# Patient Record
Sex: FEMALE | Race: BLACK OR AFRICAN AMERICAN | NOT HISPANIC OR LATINO | Employment: UNEMPLOYED | URBAN - METROPOLITAN AREA
[De-identification: names, ages, dates, MRNs, and addresses within clinical notes are randomized per-mention and may not be internally consistent; named-entity substitution may affect disease eponyms.]

---

## 2018-07-24 ENCOUNTER — APPOINTMENT (OUTPATIENT)
Dept: RADIOLOGY | Facility: MEDICAL CENTER | Age: 23
End: 2018-07-24
Attending: EMERGENCY MEDICINE
Payer: COMMERCIAL

## 2018-07-24 ENCOUNTER — HOSPITAL ENCOUNTER (EMERGENCY)
Facility: MEDICAL CENTER | Age: 23
End: 2018-07-24
Attending: EMERGENCY MEDICINE
Payer: COMMERCIAL

## 2018-07-24 VITALS
TEMPERATURE: 98.6 F | BODY MASS INDEX: 23.98 KG/M2 | WEIGHT: 152.78 LBS | HEIGHT: 67 IN | SYSTOLIC BLOOD PRESSURE: 124 MMHG | HEART RATE: 95 BPM | OXYGEN SATURATION: 97 % | RESPIRATION RATE: 18 BRPM | DIASTOLIC BLOOD PRESSURE: 75 MMHG

## 2018-07-24 DIAGNOSIS — R10.9 ABDOMINAL PAIN DURING PREGNANCY IN SECOND TRIMESTER: ICD-10-CM

## 2018-07-24 DIAGNOSIS — O26.892 ABDOMINAL PAIN DURING PREGNANCY IN SECOND TRIMESTER: ICD-10-CM

## 2018-07-24 LAB
APPEARANCE UR: CLEAR
BILIRUB UR QL STRIP.AUTO: NEGATIVE
COLOR UR: YELLOW
GLUCOSE UR STRIP.AUTO-MCNC: NEGATIVE MG/DL
KETONES UR STRIP.AUTO-MCNC: NEGATIVE MG/DL
LEUKOCYTE ESTERASE UR QL STRIP.AUTO: NEGATIVE
MICRO URNS: NORMAL
NITRITE UR QL STRIP.AUTO: NEGATIVE
PH UR STRIP.AUTO: 6 [PH]
PROT UR QL STRIP: NEGATIVE MG/DL
RBC UR QL AUTO: NEGATIVE
SP GR UR STRIP.AUTO: 1.02

## 2018-07-24 PROCEDURE — 81003 URINALYSIS AUTO W/O SCOPE: CPT

## 2018-07-24 PROCEDURE — 76817 TRANSVAGINAL US OBSTETRIC: CPT

## 2018-07-24 PROCEDURE — 99284 EMERGENCY DEPT VISIT MOD MDM: CPT

## 2018-07-24 ASSESSMENT — PAIN SCALES - GENERAL
PAINLEVEL_OUTOF10: 6
PAINLEVEL_OUTOF10: 0

## 2018-07-25 NOTE — DISCHARGE INSTRUCTIONS
Abdominal Pain During Pregnancy  Belly (abdominal) pain is common during pregnancy. Most of the time, it is not a serious problem. Other times, it can be a sign that something is wrong with the pregnancy. Always tell your doctor if you have belly pain.  Follow these instructions at home:  Monitor your belly pain for any changes. The following actions may help you feel better:  · Do not have sex (intercourse) or put anything in your vagina until you feel better.  · Rest until your pain stops.  · Drink clear fluids if you feel sick to your stomach (nauseous). Do not eat solid food until you feel better.  · Only take medicine as told by your doctor.  · Keep all doctor visits as told.  Get help right away if:  · You are bleeding, leaking fluid, or pieces of tissue come out of your vagina.  · You have more pain or cramping.  · You keep throwing up (vomiting).  · You have pain when you pee (urinate) or have blood in your pee.  · You have a fever.  · You do not feel your baby moving as much.  · You feel very weak or feel like passing out.  · You have trouble breathing, with or without belly pain.  · You have a very bad headache and belly pain.  · You have fluid leaking from your vagina and belly pain.  · You keep having watery poop (diarrhea).  · Your belly pain does not go away after resting, or the pain gets worse.  This information is not intended to replace advice given to you by your health care provider. Make sure you discuss any questions you have with your health care provider.  Document Released: 12/06/2010 Document Revised: 07/26/2017 Document Reviewed: 07/17/2014  [employer] Interactive Patient Education © 2017 [employer]

## 2018-07-25 NOTE — ED PROVIDER NOTES
"CHIEF COMPLAINT  Chief Complaint   Patient presents with   • Pregnancy     23 weeks per pt   • Pelvic Pain     \"hurts with every step I take\", x approx 20 minutes       HPI  Leila Navarro is a 23 y.o. female who presents for evaluation of bilateral lower quadrant and groin pain for approximately 20 minutes prior to arrival. Patient notes it only hurts when she is walking and she currently has no pain when she is lying in the gurney. She has had no vaginal bleeding, discharge, burning with urination, or hematuria. She has no back pain, chest pain, or shortness of breath. She has had no recent illnesses. She has had no recent trauma. Patient notes this is her 1st pregnancy and is very \"stressed.\"    REVIEW OF SYSTEMS  Constitutional: No fevers, weakness, weight loss   Skin: No rashes, abrasions, lacerations, or pruritus  HEENT: No diplopia or blurred vision, no eye pain, no discharge.  No sore throat, runny nose, sores, trouble swallowing, trouble speaking.  Neck: No neck pain, stiffness, or masses.  Chest: No pain or rashes  Pulm: No shortness of breath, cough, wheezing, stridor, or pain with inspiration/expiration  Gastrointestinal: No nausea, vomiting, diarrhea, constipation, bloating, melena, hematochezia or pain.  Genitourinary: No pain, urgency, frequency, dysuria, hematuria, or polyuria.   Musculoskeletal: No recent trauma, pain, swelling, weakness  Neurologic: No sensory or motor changes. No confusion or disorientation.  Psych: Nonsuicidal and non-homicidal.  Heme: No bleeding or bruising problems.   Immuno: No hx of recurrent infections      PAST MEDICAL HISTORY       SOCIAL HISTORY  Social History     Social History Main Topics   • Smoking status: Never Smoker   • Smokeless tobacco: Not on file   • Alcohol use No   • Drug use: No   • Sexual activity: Not on file       SURGICAL HISTORY  patient denies any surgical history    CURRENT MEDICATIONS  Home Medications    **Home medications have not yet been " "reviewed for this encounter**         ALLERGIES  No Known Allergies    PHYSICAL EXAM  VITAL SIGNS: /75   Pulse 95   Temp 37 °C (98.6 °F)   Resp 18   Ht 1.702 m (5' 7\")   Wt 69.3 kg (152 lb 12.5 oz)   SpO2 97%   BMI 23.93 kg/m²    Gen: Alert in no apparent distress.  HEENT: No signs of trauma, Bilateral external ears normal, Nose normal. Conjunctiva normal, Non-icteric.   Neck:  No tenderness, Supple, No masses  Lymphatic: No cervical lymphadenopathy noted.   Cardiovascular: Regular rate and rhythm, no murmurs.   Thorax & Lungs: Normal breath sounds, No respiratory distress, No wheezing bilateral chest rise  Abdomen: Bowel sounds normal, Soft, No tenderness, No masses, No pulsatile masses. No Guarding or rebound  Skin: Warm, Dry, No erythema, No rash.   Back: No bony tenderness, No CVA tenderness.   Extremities: Intact distal pulses, No edema, No tenderness, range of motion grossly normal all ext. No tenderness to palpation or major deformities noted.   Neurologic: Alert , no facial droop, grossly normal coordination and strength  Psychiatric: Affect normal, Judgment normal, Mood normal.       INITIAL IMPRESSION  Patient does not appear septic or toxic and actually has no pain on the initial evaluation. She only has pain when she is walking in the groin region which is likely related to round ligament pain. Regardless, I feel is reasonable to do a screening urinalysis and fetal well-being ultrasound as the patient is quite nervous about this. She has had no recent symptoms to suggest the need for a pelvic exam at this time.    LABS  Results for orders placed or performed during the hospital encounter of 07/24/18   URINALYSIS CULTURE, IF INDICATED   Result Value Ref Range    Color Yellow     Character Clear     Specific Gravity 1.025 <1.035    Ph 6.0 5.0 - 8.0    Glucose Negative Negative mg/dL    Ketones Negative Negative mg/dL    Protein Negative Negative mg/dL    Bilirubin Negative Negative    Nitrite " Negative Negative    Leukocyte Esterase Negative Negative    Occult Blood Negative Negative    Micro Urine Req see below        RADIOLOGY  US-OB PELVIS TRANSVAGINAL   Final Result      Intrauterine pregnancy with gestational age by this ultrasound of 23 weeks and 1 day with estimated delivery date 11/19/2018.      ALANIS 8.7 cm.      Low lying placenta with the edge of the placenta 2.87 cm from the internal cervical os.               COURSE & MEDICAL DECISION MAKING  Pertinent Labs & Imaging studies reviewed. (See chart for details)  Patient has no findings today to suggest an emergent etiology to explain her pain and most likely suffering from round ligament pain. She does not appear septic or toxic and had no abdominal pain or tenderness throughout her stay in the emergency department. I did not feel further imaging was necessary and I very much doubt appendicitis. I did not feel pelvic exam which  benefit the patient in any way. She'll follow up with her primary care physician next week or return if her symptoms worsen or change.    FINAL IMPRESSION  1. Abdominal pain in 2nd trimester pregnancy  2.   3.         Electronically signed by: Maik Mata, 7/24/2018 5:39 PM

## 2018-08-13 ENCOUNTER — NON-PROVIDER VISIT (OUTPATIENT)
Dept: OBGYN | Facility: CLINIC | Age: 23
End: 2018-08-13
Payer: MEDICAID

## 2018-08-13 DIAGNOSIS — Z32.01 PREGNANCY EXAMINATION OR TEST, POSITIVE RESULT: ICD-10-CM

## 2018-08-13 LAB
INT CON NEG: NEGATIVE
INT CON POS: POSITIVE
POC URINE PREGNANCY TEST: POSITIVE

## 2018-08-13 PROCEDURE — 81025 URINE PREGNANCY TEST: CPT | Performed by: OBSTETRICS & GYNECOLOGY

## 2018-08-27 ENCOUNTER — HOSPITAL ENCOUNTER (EMERGENCY)
Facility: MEDICAL CENTER | Age: 23
End: 2018-08-27
Attending: EMERGENCY MEDICINE
Payer: MEDICAID

## 2018-08-27 VITALS
WEIGHT: 159.17 LBS | SYSTOLIC BLOOD PRESSURE: 130 MMHG | HEIGHT: 66 IN | TEMPERATURE: 97.8 F | RESPIRATION RATE: 16 BRPM | DIASTOLIC BLOOD PRESSURE: 88 MMHG | OXYGEN SATURATION: 98 % | HEART RATE: 88 BPM | BODY MASS INDEX: 25.58 KG/M2

## 2018-08-27 DIAGNOSIS — Z3A.29 29 WEEKS GESTATION OF PREGNANCY: ICD-10-CM

## 2018-08-27 DIAGNOSIS — J01.90 ACUTE SINUSITIS, RECURRENCE NOT SPECIFIED, UNSPECIFIED LOCATION: ICD-10-CM

## 2018-08-27 LAB
APPEARANCE UR: CLEAR
BILIRUB UR QL STRIP.AUTO: NEGATIVE
COLOR UR: YELLOW
GLUCOSE UR STRIP.AUTO-MCNC: NEGATIVE MG/DL
KETONES UR STRIP.AUTO-MCNC: NEGATIVE MG/DL
LEUKOCYTE ESTERASE UR QL STRIP.AUTO: NEGATIVE
MICRO URNS: NORMAL
NITRITE UR QL STRIP.AUTO: NEGATIVE
PH UR STRIP.AUTO: 6.5 [PH]
PROT UR QL STRIP: NEGATIVE MG/DL
RBC UR QL AUTO: NEGATIVE
SP GR UR STRIP.AUTO: 1.01

## 2018-08-27 PROCEDURE — 99284 EMERGENCY DEPT VISIT MOD MDM: CPT

## 2018-08-27 PROCEDURE — 81003 URINALYSIS AUTO W/O SCOPE: CPT

## 2018-08-27 RX ORDER — AMOXICILLIN 500 MG/1
500 CAPSULE ORAL 3 TIMES DAILY
Qty: 30 CAP | Refills: 0 | Status: SHIPPED | OUTPATIENT
Start: 2018-08-27 | End: 2018-09-06

## 2018-08-27 RX ORDER — ONDANSETRON 4 MG/1
4 TABLET, ORALLY DISINTEGRATING ORAL EVERY 8 HOURS PRN
Qty: 15 TAB | Refills: 0 | Status: ON HOLD | OUTPATIENT
Start: 2018-08-27 | End: 2018-10-24

## 2018-08-27 ASSESSMENT — PAIN SCALES - GENERAL: PAINLEVEL_OUTOF10: 0

## 2018-08-27 NOTE — DISCHARGE INSTRUCTIONS
Third Trimester of Pregnancy  The third trimester is from week 29 through week 42, months 7 through 9. This trimester is when your unborn baby (fetus) is growing very fast. At the end of the ninth month, the unborn baby is about 20 inches in length. It weighs about 6-10 pounds.  Follow these instructions at home:  · Avoid all smoking, herbs, and alcohol. Avoid drugs not approved by your doctor.  · Do not use any tobacco products, including cigarettes, chewing tobacco, and electronic cigarettes. If you need help quitting, ask your doctor. You may get counseling or other support to help you quit.  · Only take medicine as told by your doctor. Some medicines are safe and some are not during pregnancy.  · Exercise only as told by your doctor. Stop exercising if you start having cramps.  · Eat regular, healthy meals.  · Wear a good support bra if your breasts are tender.  · Do not use hot tubs, steam rooms, or saunas.  · Wear your seat belt when driving.  · Avoid raw meat, uncooked cheese, and liter boxes and soil used by cats.  · Take your prenatal vitamins.  · Take [id number] milligrams of calcium daily starting at the 20th week of pregnancy until you deliver your baby.  · Try taking medicine that helps you poop (stool softener) as needed, and if your doctor approves. Eat more fiber by eating fresh fruit, vegetables, and whole grains. Drink enough fluids to keep your pee (urine) clear or pale yellow.  · Take warm water baths (sitz baths) to soothe pain or discomfort caused by hemorrhoids. Use hemorrhoid cream if your doctor approves.  · If you have puffy, bulging veins (varicose veins), wear support hose. Raise (elevate) your feet for 15 minutes, 3-4 times a day. Limit salt in your diet.  · Avoid heavy lifting, wear low heels, and sit up straight.  · Rest with your legs raised if you have leg cramps or low back pain.  · Visit your dentist if you have not gone during your pregnancy. Use a soft toothbrush to brush your  teeth. Be gentle when you floss.  · You can have sex (intercourse) unless your doctor tells you not to.  · Do not travel far distances unless you must. Only do so with your doctor's approval.  · Take prenatal classes.  · Practice driving to the hospital.  · Pack your hospital bag.  · Prepare the baby's room.  · Go to your doctor visits.  Get help if:  · You are not sure if you are in labor or if your water has broken.  · You are dizzy.  · You have mild cramps or pressure in your lower belly (abdominal).  · You have a nagging pain in your belly area.  · You continue to feel sick to your stomach (nauseous), throw up (vomit), or have watery poop (diarrhea).  · You have bad smelling fluid coming from your vagina.  · You have pain with peeing (urination).  Get help right away if:  · You have a fever.  · You are leaking fluid from your vagina.  · You are spotting or bleeding from your vagina.  · You have severe belly cramping or pain.  · You lose or gain weight rapidly.  · You have trouble catching your breath and have chest pain.  · You notice sudden or extreme puffiness (swelling) of your face, hands, ankles, feet, or legs.  · You have not felt the baby move in over an hour.  · You have severe headaches that do not go away with medicine.  · You have vision changes.  This information is not intended to replace advice given to you by your health care provider. Make sure you discuss any questions you have with your health care provider.  Document Released: 03/14/2011 Document Revised: 05/25/2017 Document Reviewed: 02/18/2014  [employer] Interactive Patient Education © 2017 [employer]    Sinusitis, Adult  Sinusitis is soreness and inflammation of your sinuses. Sinuses are hollow spaces in the bones around your face. They are located:  · Around your eyes.  · In the middle of your forehead.  · Behind your nose.  · In your cheekbones.  Your sinuses and nasal passages are lined with a stringy fluid (mucus). Mucus normally  drains out of your sinuses. When your nasal tissues get inflamed or swollen, the mucus can get trapped or blocked so air cannot flow through your sinuses. This lets bacteria, viruses, and funguses grow, and that leads to infection.  Follow these instructions at home:  Medicines  · Take, use, or apply over-the-counter and prescription medicines only as told by your doctor. These may include nasal sprays.  · If you were prescribed an antibiotic medicine, take it as told by your doctor. Do not stop taking the antibiotic even if you start to feel better.  Hydrate and Humidify  · Drink enough water to keep your pee (urine) clear or pale yellow.  · Use a cool mist humidifier to keep the humidity level in your home above 50%.  · Breathe in steam for 10-15 minutes, 3-4 times a day or as told by your doctor. You can do this in the bathroom while a hot shower is running.  · Try not to spend time in cool or dry air.  Rest  · Rest as much as possible.  · Sleep with your head raised (elevated).  · Make sure to get enough sleep each night.  General instructions  · Put a warm, moist washcloth on your face 3-4 times a day or as told by your doctor. This will help with discomfort.  · Wash your hands often with soap and water. If there is no soap and water, use hand .  · Do not smoke. Avoid being around people who are smoking (secondhand smoke).  · Keep all follow-up visits as told by your doctor. This is important.  Contact a doctor if:  · You have a fever.  · Your symptoms get worse.  · Your symptoms do not get better within 10 days.  Get help right away if:  · You have a very bad headache.  · You cannot stop throwing up (vomiting).  · You have pain or swelling around your face or eyes.  · You have trouble seeing.  · You feel confused.  · Your neck is stiff.  · You have trouble breathing.  This information is not intended to replace advice given to you by your health care provider. Make sure you discuss any questions you  have with your health care provider.  Document Released: 06/05/2009 Document Revised: 08/13/2017 Document Reviewed: 10/12/2016  [employer] Interactive Patient Education © 2017 [employer]

## 2018-08-27 NOTE — ED PROVIDER NOTES
ED Provider Note    CHIEF COMPLAINT  Chief Complaint   Patient presents with   • Abdominal Cramping     Pt is 29 weeks pregnant, reports decreased fetal movement for past few days. Pt reports family hx of pre term labor, this is her first pregnancy. Denies spotting   • Sore Throat     Since yesterday, denies fevers        HPI  Leila Navarro is a 23 y.o. female who presents sore throat ear pain drainage from nasal passages for several days.  Patient states that she is 29 weeks pregnant.  She still feels the baby moving but does not quite as much today as last week.  Nothing really makes her better or worse.  No cough no vomiting or diarrhea no abdominal pain.    States that she had prenatal care when she is about 2 months along but then moved to [place].  He has not seen an OB/GYN doctor since then.  She states she called the pregnancy center and they could not get her in until October.  She has seen an OB/GYN doctor in [place] for this pregnancy however.  No vaginal bleeding.  Nothing makes it better or worse.    REVIEW OF SYSTEMS  CONSTITUTIONAL:  Denies fever, chills, positive weight gain  EYES:  Deniesdischarge   ENT: Positive sore throat runny nose and ear pain  CARDIOVASCULAR:  Denies chest pain, palpitations or swelling  RESPIRATORY:  Denies cough or shortness of breath or difficulty breathing  GI:  Denies abdominal pain,  vomiting, or diarrhea  MUSCULOSKELETAL:  Denies weakness joint swelling or back pain  SKIN:  No rash  ALLERGIC: No itchy rashes.  NEUROLOGIC:  Denies headache, focal weakness or numbness  PSYCHIATRIC:  Denies depression    PAST MEDICAL HISTORY    29 weeks pregnant    FAMILY HISTORY   labor    SOCIAL HISTORY   reports that she has never smoked. She has never used smokeless tobacco. She reports that she does not drink alcohol or use drugs.    SURGICAL HISTORY  History reviewed. No pertinent surgical history.    CURRENT MEDICATIONS  Home Medications     Reviewed by Annie ROLDAN  "Yue Verduzco (Pharmacy Tech) on 08/27/18 at 1147  Med List Status: Complete   Medication Last Dose Status        Patient Martinez Taking any Medications                       ALLERGIES  No Known Allergies      PHYSICAL EXAM  VITAL SIGNS: /93   Pulse (!) 106   Temp 36.5 °C (97.7 °F)   Resp 18   Ht 1.676 m (5' 6\")   Wt 72.2 kg (159 lb 2.8 oz)   SpO2 97%   BMI 25.69 kg/m²      Constitutional: Patient is awake alert person place and time. No acute respiratory distress Well developed, Well nourished, Non-toxic appearance.  Sitting up.  HENT: Normocephalic, Atraumatic,  Bilateral external ears bilateral erythema., Oropharynx erythematous moist with no exudates, Nose patent.   Eyes:  Sclera and conjunctiva clear, No discharge.   Neck:  Supple no nuchal rigidity,   Cardiovascular: Heart is regular rate and rhythm no murmur  Thorax & Lungs: Chest is symmetrical, with good breath sounds. No wheezing or crackles. No respiratory distress,  Abdomen:  Soft, gravid nontender.  Back: Non tender with palpation, No CVA tenderness.   Extremities: No c edema.  Non tender.   Musculoskeletal: Good range of motion upper lower extremities.    Neurologic: Alert & oriented   Strength is strength is symmetrical in upper extremities.  DTRs are 2+ in the biceps.  She is not hyperreflexic         COURSE & MEDICAL DECISION MAKING  Pertinent Labs & Imaging studies reviewed. (See chart for details)  Patient is 29 weeks pregnant.  She had prenatal care first couple of months and then [place] to [place].  She states that she still feels her baby moving but maybe not as much now as she did last week.  She is also had a cold with a sore throat ear pain runny nose and a cough.  Positive yellow sputum.  Currently she has no abdominal pain no cramping at all.  I had a long discussion with her about need for very close follow-up with her pregnancy and she can certainly go to the pregnancy center or any OB/GYN doctor this on her " insurance plan.  Although I told her she could not find anybody that we will see her at this late date she may consider staying down in her previous home in [place] until she delivers her baby where she is can continue and has had prenatal care and can continue this.  She states that she may consider this as well.  At this point time I think that she may have a bacterial sinusitis were placed on amoxicillin.  Again she understands she will need close follow-up as an outpatient for prenatal care.      Stable for discharge    FINAL IMPRESSION  1.  Sinusitis/upper respiratory tract infection  2.  Pregnancy 29 weeks       PLAN  1.  Follow-up with the [facility] pregnancy center within 1 week  2.  Follow-up the primary care doctor the [facility] clinic within 1 week  3.  Sinusitis information sheet  4.  Amoxicillin/pregnancy information sheet  5. Return to the emergency department for increased pains, fevers, vomiting or change in condition.  Electronically signed by: Austin Loera, 8/27/2018 12:22 PM

## 2018-08-27 NOTE — ED NOTES
"Chief Complaint   Patient presents with   • Abdominal Cramping     Pt is 29 weeks pregnant, reports decreased fetal movement for past few days. Pt reports family hx of pre term labor, this is her first pregnancy. Denies spotting   • Sore Throat     Since yesterday, denies fevers      /93   Pulse (!) 106   Temp 36.5 °C (97.7 °F)   Resp 18   Ht 1.676 m (5' 6\")   Wt 72.2 kg (159 lb 2.8 oz)   SpO2 97%   BMI 25.69 kg/m²     "

## 2018-10-10 ENCOUNTER — APPOINTMENT (OUTPATIENT)
Dept: RADIOLOGY | Facility: MEDICAL CENTER | Age: 23
End: 2018-10-10
Attending: ADVANCED PRACTICE MIDWIFE
Payer: MEDICAID

## 2018-10-10 ENCOUNTER — HOSPITAL ENCOUNTER (OUTPATIENT)
Facility: MEDICAL CENTER | Age: 23
End: 2018-10-10
Payer: MEDICAID

## 2018-10-10 VITALS
HEIGHT: 67 IN | BODY MASS INDEX: 27 KG/M2 | TEMPERATURE: 97.3 F | SYSTOLIC BLOOD PRESSURE: 145 MMHG | HEART RATE: 103 BPM | WEIGHT: 172 LBS | DIASTOLIC BLOOD PRESSURE: 103 MMHG

## 2018-10-10 LAB
ALBUMIN SERPL BCP-MCNC: 3.3 G/DL (ref 3.2–4.9)
ALBUMIN/GLOB SERPL: 1 G/DL
ALP SERPL-CCNC: 151 U/L (ref 30–99)
ALT SERPL-CCNC: 13 U/L (ref 2–50)
ANION GAP SERPL CALC-SCNC: 7 MMOL/L (ref 0–11.9)
APPEARANCE UR: ABNORMAL
AST SERPL-CCNC: 18 U/L (ref 12–45)
BACTERIA #/AREA URNS HPF: ABNORMAL /HPF
BASOPHILS # BLD AUTO: 0.4 % (ref 0–1.8)
BASOPHILS # BLD: 0.03 K/UL (ref 0–0.12)
BILIRUB SERPL-MCNC: 0.3 MG/DL (ref 0.1–1.5)
BILIRUB UR QL STRIP.AUTO: NEGATIVE
BUN SERPL-MCNC: 6 MG/DL (ref 8–22)
CALCIUM SERPL-MCNC: 9.6 MG/DL (ref 8.5–10.5)
CHLORIDE SERPL-SCNC: 107 MMOL/L (ref 96–112)
CO2 SERPL-SCNC: 22 MMOL/L (ref 20–33)
COLOR UR: YELLOW
CREAT SERPL-MCNC: 0.68 MG/DL (ref 0.5–1.4)
CREAT UR-MCNC: 383.5 MG/DL
EOSINOPHIL # BLD AUTO: 0.09 K/UL (ref 0–0.51)
EOSINOPHIL NFR BLD: 1.2 % (ref 0–6.9)
EPI CELLS #/AREA URNS HPF: ABNORMAL /HPF
ERYTHROCYTE [DISTWIDTH] IN BLOOD BY AUTOMATED COUNT: 38.4 FL (ref 35.9–50)
GLOBULIN SER CALC-MCNC: 3.3 G/DL (ref 1.9–3.5)
GLUCOSE SERPL-MCNC: 97 MG/DL (ref 65–99)
GLUCOSE UR STRIP.AUTO-MCNC: NEGATIVE MG/DL
HCT VFR BLD AUTO: 32.1 % (ref 37–47)
HGB BLD-MCNC: 10.6 G/DL (ref 12–16)
IMM GRANULOCYTES # BLD AUTO: 0.07 K/UL (ref 0–0.11)
IMM GRANULOCYTES NFR BLD AUTO: 1 % (ref 0–0.9)
KETONES UR STRIP.AUTO-MCNC: NEGATIVE MG/DL
LEUKOCYTE ESTERASE UR QL STRIP.AUTO: NEGATIVE
LYMPHOCYTES # BLD AUTO: 1.34 K/UL (ref 1–4.8)
LYMPHOCYTES NFR BLD: 18.5 % (ref 22–41)
MCH RBC QN AUTO: 25.9 PG (ref 27–33)
MCHC RBC AUTO-ENTMCNC: 33 G/DL (ref 33.6–35)
MCV RBC AUTO: 78.3 FL (ref 81.4–97.8)
MICRO URNS: ABNORMAL
MONOCYTES # BLD AUTO: 0.54 K/UL (ref 0–0.85)
MONOCYTES NFR BLD AUTO: 7.5 % (ref 0–13.4)
NEUTROPHILS # BLD AUTO: 5.17 K/UL (ref 2–7.15)
NEUTROPHILS NFR BLD: 71.4 % (ref 44–72)
NITRITE UR QL STRIP.AUTO: NEGATIVE
NRBC # BLD AUTO: 0 K/UL
NRBC BLD-RTO: 0 /100 WBC
PH UR STRIP.AUTO: 6.5 [PH]
PLATELET # BLD AUTO: 332 K/UL (ref 164–446)
PMV BLD AUTO: 10.5 FL (ref 9–12.9)
POTASSIUM SERPL-SCNC: 4 MMOL/L (ref 3.6–5.5)
PROT SERPL-MCNC: 6.6 G/DL (ref 6–8.2)
PROT UR QL STRIP: NEGATIVE MG/DL
PROT UR-MCNC: 38.6 MG/DL (ref 0–15)
PROT/CREAT UR: 101 MG/G (ref 10–107)
RBC # BLD AUTO: 4.1 M/UL (ref 4.2–5.4)
RBC # URNS HPF: ABNORMAL /HPF
RBC UR QL AUTO: NEGATIVE
SODIUM SERPL-SCNC: 136 MMOL/L (ref 135–145)
SP GR UR STRIP.AUTO: 1.02
URATE SERPL-MCNC: 3.1 MG/DL (ref 1.9–8.2)
UROBILINOGEN UR STRIP.AUTO-MCNC: 1 MG/DL
WBC # BLD AUTO: 7.2 K/UL (ref 4.8–10.8)
WBC #/AREA URNS HPF: ABNORMAL /HPF

## 2018-10-10 PROCEDURE — 84156 ASSAY OF PROTEIN URINE: CPT

## 2018-10-10 PROCEDURE — 85025 COMPLETE CBC W/AUTO DIFF WBC: CPT

## 2018-10-10 PROCEDURE — 36415 COLL VENOUS BLD VENIPUNCTURE: CPT

## 2018-10-10 PROCEDURE — 84550 ASSAY OF BLOOD/URIC ACID: CPT

## 2018-10-10 PROCEDURE — 59025 FETAL NON-STRESS TEST: CPT | Mod: XU

## 2018-10-10 PROCEDURE — 76819 FETAL BIOPHYS PROFIL W/O NST: CPT

## 2018-10-10 PROCEDURE — 76816 OB US FOLLOW-UP PER FETUS: CPT

## 2018-10-10 PROCEDURE — 81001 URINALYSIS AUTO W/SCOPE: CPT

## 2018-10-10 PROCEDURE — 82570 ASSAY OF URINE CREATININE: CPT

## 2018-10-10 PROCEDURE — 80053 COMPREHEN METABOLIC PANEL: CPT

## 2018-10-10 NOTE — PROGRESS NOTES
EDC- , EGA- 35    1025- Pt arrived to L&D for PIH workup.  EMF/TOCO applied, serial BP's started.  Urine sample obtained.  Pt denies HA, reports mildly blurry vision and RUQ pain, denies swelling. Pt reports +FM, denies UC's, LOF, or VB.   1330- Report to CONNOR Metz regarding BP's, labs, and US results.  Orders received to discharge pt and have pt follow up with CNM tomorrow at 1000.  Discussed follow up tomorrow, PIH precautions, labor precautions, and kick counts with pt, verbalizes understanding.  1335- Pt discharged home ambulatory in stable condition with FOB at side.

## 2018-10-11 LAB — STREP GP B DNA PCR: POSITIVE

## 2018-10-12 ENCOUNTER — HOSPITAL ENCOUNTER (OUTPATIENT)
Facility: MEDICAL CENTER | Age: 23
End: 2018-10-12
Payer: MEDICAID

## 2018-10-12 VITALS
BODY MASS INDEX: 27 KG/M2 | SYSTOLIC BLOOD PRESSURE: 147 MMHG | DIASTOLIC BLOOD PRESSURE: 103 MMHG | HEART RATE: 96 BPM | RESPIRATION RATE: 18 BRPM | TEMPERATURE: 98.1 F | HEIGHT: 67 IN | WEIGHT: 172 LBS

## 2018-10-12 LAB
ALBUMIN SERPL BCP-MCNC: 3.2 G/DL (ref 3.2–4.9)
ALBUMIN/GLOB SERPL: 0.9 G/DL
ALP SERPL-CCNC: 148 U/L (ref 30–99)
ALT SERPL-CCNC: 13 U/L (ref 2–50)
ANION GAP SERPL CALC-SCNC: 5 MMOL/L (ref 0–11.9)
APPEARANCE UR: CLEAR
AST SERPL-CCNC: 14 U/L (ref 12–45)
BASOPHILS # BLD AUTO: 0.3 % (ref 0–1.8)
BASOPHILS # BLD: 0.02 K/UL (ref 0–0.12)
BILIRUB SERPL-MCNC: 0.3 MG/DL (ref 0.1–1.5)
BUN SERPL-MCNC: 6 MG/DL (ref 8–22)
CALCIUM SERPL-MCNC: 8.6 MG/DL (ref 8.5–10.5)
CHLORIDE SERPL-SCNC: 107 MMOL/L (ref 96–112)
CO2 SERPL-SCNC: 24 MMOL/L (ref 20–33)
COLOR UR AUTO: YELLOW
CREAT SERPL-MCNC: 0.7 MG/DL (ref 0.5–1.4)
CREAT UR-MCNC: 50 MG/DL
EOSINOPHIL # BLD AUTO: 0.09 K/UL (ref 0–0.51)
EOSINOPHIL NFR BLD: 1.1 % (ref 0–6.9)
ERYTHROCYTE [DISTWIDTH] IN BLOOD BY AUTOMATED COUNT: 38.4 FL (ref 35.9–50)
GLOBULIN SER CALC-MCNC: 3.5 G/DL (ref 1.9–3.5)
GLUCOSE SERPL-MCNC: 106 MG/DL (ref 65–99)
GLUCOSE UR QL STRIP.AUTO: NEGATIVE MG/DL
HCT VFR BLD AUTO: 30.4 % (ref 37–47)
HGB BLD-MCNC: 9.9 G/DL (ref 12–16)
IMM GRANULOCYTES # BLD AUTO: 0.06 K/UL (ref 0–0.11)
IMM GRANULOCYTES NFR BLD AUTO: 0.8 % (ref 0–0.9)
KETONES UR QL STRIP.AUTO: NEGATIVE MG/DL
LEUKOCYTE ESTERASE UR QL STRIP.AUTO: NEGATIVE
LYMPHOCYTES # BLD AUTO: 1.64 K/UL (ref 1–4.8)
LYMPHOCYTES NFR BLD: 20.7 % (ref 22–41)
MCH RBC QN AUTO: 25.6 PG (ref 27–33)
MCHC RBC AUTO-ENTMCNC: 32.6 G/DL (ref 33.6–35)
MCV RBC AUTO: 78.8 FL (ref 81.4–97.8)
MONOCYTES # BLD AUTO: 0.68 K/UL (ref 0–0.85)
MONOCYTES NFR BLD AUTO: 8.6 % (ref 0–13.4)
NEUTROPHILS # BLD AUTO: 5.43 K/UL (ref 2–7.15)
NEUTROPHILS NFR BLD: 68.5 % (ref 44–72)
NITRITE UR QL STRIP.AUTO: NEGATIVE
NRBC # BLD AUTO: 0 K/UL
NRBC BLD-RTO: 0 /100 WBC
PH UR STRIP.AUTO: 7 [PH]
PLATELET # BLD AUTO: 312 K/UL (ref 164–446)
PMV BLD AUTO: 10.4 FL (ref 9–12.9)
POTASSIUM SERPL-SCNC: 4 MMOL/L (ref 3.6–5.5)
PROT SERPL-MCNC: 6.7 G/DL (ref 6–8.2)
PROT UR QL STRIP: NEGATIVE MG/DL
PROT UR-MCNC: 6.1 MG/DL (ref 0–15)
PROT/CREAT UR: 122 MG/G (ref 10–107)
RBC # BLD AUTO: 3.86 M/UL (ref 4.2–5.4)
RBC UR QL AUTO: NEGATIVE
SODIUM SERPL-SCNC: 136 MMOL/L (ref 135–145)
SP GR UR: 1.01
URATE SERPL-MCNC: 3 MG/DL (ref 1.9–8.2)
WBC # BLD AUTO: 7.9 K/UL (ref 4.8–10.8)

## 2018-10-12 PROCEDURE — A9270 NON-COVERED ITEM OR SERVICE: HCPCS | Performed by: FAMILY MEDICINE

## 2018-10-12 PROCEDURE — 84550 ASSAY OF BLOOD/URIC ACID: CPT

## 2018-10-12 PROCEDURE — 82570 ASSAY OF URINE CREATININE: CPT

## 2018-10-12 PROCEDURE — 84156 ASSAY OF PROTEIN URINE: CPT

## 2018-10-12 PROCEDURE — 59025 FETAL NON-STRESS TEST: CPT

## 2018-10-12 PROCEDURE — 80053 COMPREHEN METABOLIC PANEL: CPT

## 2018-10-12 PROCEDURE — 36415 COLL VENOUS BLD VENIPUNCTURE: CPT

## 2018-10-12 PROCEDURE — 700102 HCHG RX REV CODE 250 W/ 637 OVERRIDE(OP): Performed by: FAMILY MEDICINE

## 2018-10-12 PROCEDURE — 81002 URINALYSIS NONAUTO W/O SCOPE: CPT

## 2018-10-12 PROCEDURE — 85025 COMPLETE CBC W/AUTO DIFF WBC: CPT

## 2018-10-12 RX ORDER — ACETAMINOPHEN 325 MG/1
325 TABLET ORAL EVERY 6 HOURS PRN
Status: DISCONTINUED | OUTPATIENT
Start: 2018-10-12 | End: 2018-10-13 | Stop reason: HOSPADM

## 2018-10-12 RX ORDER — ACETAMINOPHEN 500 MG
500 TABLET ORAL EVERY 6 HOURS PRN
Status: DISCONTINUED | OUTPATIENT
Start: 2018-10-12 | End: 2018-10-12

## 2018-10-12 RX ADMIN — ACETAMINOPHEN 325 MG: 325 TABLET, FILM COATED ORAL at 20:48

## 2018-10-13 NOTE — PROGRESS NOTES
"[facility] LABOR AND DELIVERY TRIAGE NOTE    PATIENT ID:  NAME:  Leila Navarro  MRN:               [id number]  YOB: 1995    CC:  Headache and elevation of blood pressure    HPI:  Leila Navarro is a 23 y.o. female  at 35w2d by LMP consistent with 18 week ultrasound. Patient presents reporting that her daily blood pressure monitoring has increased today (up to 153/107 at home) and she has started to have a moderate headache and some nausea. She has been being followed with daily at home blood pressure monitoring, but has not been started on any medications. She has not tried any medication for her headache. She also admits to RUQ pain and a \"bubble in her chest.\" She is not experiencing any contractions. She reports normal fetal movement. Denies loss of fluid or bleeding. Her vision has been worsened during pregnancy, but denies any acute changes and has never had any flashes, floaters, aura, spots or photosensitivity. She otherwise denies any other medical conditions. Her blood pressures were normal prior to 30 weeks gestation.    Prenatal Care: Initially seen in [place], [place]. Transfer of care to [facility] midwives at 30 weeks.    Recent Labs      10/10/18   1102   WBC  7.2   RBC  4.10*   HEMOGLOBIN  10.6*   HEMATOCRIT  32.1*   MCV  78.3*   MCH  25.9*   RDW  38.4   PLATELETCT  332   MPV  10.5   NEUTSPOLYS  71.40   LYMPHOCYTES  18.50*   MONOCYTES  7.50   EOSINOPHILS  1.20   BASOPHILS  0.40     Recent Labs      10/10/18   1102   SODIUM  136   POTASSIUM  4.0   CHLORIDE  107   CO2  22   GLUCOSE  97   BUN  6*     POB Hx:  OB History    Para Term  AB Living   1             SAB TAB Ectopic Molar Multiple Live Births                    # Outcome Date GA Lbr Emanuel/2nd Weight Sex Delivery Anes PTL Lv   1 Current                   PMH/Problem List:    No past medical history on file.  There are no active problems to display for this patient.      Current Outpatient Medications:  No current " "facility-administered medications on file prior to encounter.      Current Outpatient Prescriptions on File Prior to Encounter   Medication Sig Dispense Refill   • ondansetron (ZOFRAN ODT) 4 MG TABLET DISPERSIBLE Take 1 Tab by mouth every 8 hours as needed. 15 Tab 0       PSH:    No past surgical history on file.    Allergies:   No Known Allergies    SH:  Social History     Social History   • Marital status: Single     Spouse name: N/A   • Number of children: N/A   • Years of education: N/A     Occupational History   • Not on file.     Social History Main Topics   • Smoking status: Never Smoker   • Smokeless tobacco: Never Used   • Alcohol use No   • Drug use: No   • Sexual activity: Not on file     Other Topics Concern   • Not on file     Social History Narrative   • No narrative on file         PHYSICAL EXAM:  Vitals:    10/12/18 2001 10/12/18 2008   BP: 142/100 142/100   Pulse: (!) 116 (!) 116   Resp:  18   Temp:  36.7 °C (98.1 °F)   TempSrc:  Temporal   Weight:  78 kg (172 lb)   Height:  1.702 m (5' 7\")     Temp (24hrs), Av.7 °C (98.1 °F), Min:36.7 °C (98.1 °F), Max:36.7 °C (98.1 °F)    General: No acute distress, resting comfortably in bed.  HEENT: normocephalic, nontraumatic, PERRLA, EOMI  Cardiovascular: Heart RRR with no murmurs, rubs or gallops. Distal Pulses 2+  Respiratory: symmetric chest expansion, lungs CTA bilaterally with no wheezes rales or rhonci  Abdomen: gravid, nontender  Musculoskeletal: strength 5/5 in four extremities  Neuro: non focal with no numbness, tingling or changes in sensation    [place]: No contractions. FHR: baseline 150 with moderate variability with accels to 160    LABS:  Hgb 9.9  Platelets 312  AST 14  ALT 13  Serum Cr 0.70  Uric acid 3.0  Random urine Protein:Creatinine 0.12    A/P: 24 yo F with PIH at 35w2d here with increasing blood pressure.  She was given a dose of Tylenol which greatly improved her headache. Reactive NST. Blood pressure came down somewhat to 136/98. Labs " largely unchanged over her last visit.    She is given a prescription for labetalol 100mg BID. She is scheduled for bi-weekly NST's on Mondays and Thursdays. Next office visit is Monday. She is to continue monitoring pressures daily. Return with any increase in blood pressures or worsening symptoms. Labor precautions discussed.    Hgb is low at 9.9. She is prescribed iron 325 BID. Discussed constipation risk and remedies.    Dispo: She is discharged to home

## 2018-10-13 NOTE — PROGRESS NOTES
Pt is a 22 yo  with an EDC of  making her 35w2d today here for PIH workup.  Serial BPs 140s/100s.  UA WNL.  PT reports headache, no visual disturbances, nausea present, and mild chest discomfort when taking a deep breath.  Pt reports + FM and denies UCs, LOF, or vaginal bleeding.    Dr. Simms at bedside assessing pt - consult with Dr. Martin - will repeat PIH labs and compare values from 10/10/18.      2300 Dr. Simms reviewed labs with Dr. Doherty.  Ok to discharge home.  Will follow up with 2x/weekly NSTs in clinic.  Will also start on PO labetalol.  Rx given to pt by Dr. Simms.      Discharge instructions reviewed with pt, left with belongings and FOB at 2310

## 2018-10-15 ENCOUNTER — HOSPITAL ENCOUNTER (OUTPATIENT)
Facility: MEDICAL CENTER | Age: 23
End: 2018-10-15
Payer: MEDICAID

## 2018-10-15 ENCOUNTER — APPOINTMENT (OUTPATIENT)
Dept: RADIOLOGY | Facility: MEDICAL CENTER | Age: 23
End: 2018-10-15
Attending: NURSE PRACTITIONER
Payer: MEDICAID

## 2018-10-15 VITALS
BODY MASS INDEX: 26.68 KG/M2 | SYSTOLIC BLOOD PRESSURE: 144 MMHG | WEIGHT: 170 LBS | HEART RATE: 98 BPM | DIASTOLIC BLOOD PRESSURE: 102 MMHG | HEIGHT: 67 IN

## 2018-10-15 LAB
ALBUMIN SERPL BCP-MCNC: 3.4 G/DL (ref 3.2–4.9)
ALBUMIN/GLOB SERPL: 1 G/DL
ALP SERPL-CCNC: 162 U/L (ref 30–99)
ALT SERPL-CCNC: 12 U/L (ref 2–50)
ANION GAP SERPL CALC-SCNC: 11 MMOL/L (ref 0–11.9)
APPEARANCE UR: CLEAR
AST SERPL-CCNC: 16 U/L (ref 12–45)
BASOPHILS # BLD AUTO: 0.4 % (ref 0–1.8)
BASOPHILS # BLD: 0.03 K/UL (ref 0–0.12)
BILIRUB SERPL-MCNC: 0.4 MG/DL (ref 0.1–1.5)
BUN SERPL-MCNC: 6 MG/DL (ref 8–22)
CALCIUM SERPL-MCNC: 9.1 MG/DL (ref 8.5–10.5)
CHLORIDE SERPL-SCNC: 105 MMOL/L (ref 96–112)
CO2 SERPL-SCNC: 20 MMOL/L (ref 20–33)
COLOR UR AUTO: YELLOW
CREAT SERPL-MCNC: 0.72 MG/DL (ref 0.5–1.4)
EOSINOPHIL # BLD AUTO: 0.07 K/UL (ref 0–0.51)
EOSINOPHIL NFR BLD: 0.8 % (ref 0–6.9)
ERYTHROCYTE [DISTWIDTH] IN BLOOD BY AUTOMATED COUNT: 38.2 FL (ref 35.9–50)
GLOBULIN SER CALC-MCNC: 3.5 G/DL (ref 1.9–3.5)
GLUCOSE SERPL-MCNC: 112 MG/DL (ref 65–99)
GLUCOSE UR QL STRIP.AUTO: NEGATIVE MG/DL
HCT VFR BLD AUTO: 31.8 % (ref 37–47)
HGB BLD-MCNC: 10.5 G/DL (ref 12–16)
IMM GRANULOCYTES # BLD AUTO: 0.12 K/UL (ref 0–0.11)
IMM GRANULOCYTES NFR BLD AUTO: 1.4 % (ref 0–0.9)
KETONES UR QL STRIP.AUTO: NEGATIVE MG/DL
LEUKOCYTE ESTERASE UR QL STRIP.AUTO: NEGATIVE
LYMPHOCYTES # BLD AUTO: 1.39 K/UL (ref 1–4.8)
LYMPHOCYTES NFR BLD: 16.8 % (ref 22–41)
MCH RBC QN AUTO: 25.8 PG (ref 27–33)
MCHC RBC AUTO-ENTMCNC: 33 G/DL (ref 33.6–35)
MCV RBC AUTO: 78.1 FL (ref 81.4–97.8)
MONOCYTES # BLD AUTO: 0.41 K/UL (ref 0–0.85)
MONOCYTES NFR BLD AUTO: 4.9 % (ref 0–13.4)
NEUTROPHILS # BLD AUTO: 6.27 K/UL (ref 2–7.15)
NEUTROPHILS NFR BLD: 75.7 % (ref 44–72)
NITRITE UR QL STRIP.AUTO: NEGATIVE
NRBC # BLD AUTO: 0 K/UL
NRBC BLD-RTO: 0 /100 WBC
PH UR STRIP.AUTO: 7 [PH]
PLATELET # BLD AUTO: 302 K/UL (ref 164–446)
PMV BLD AUTO: 10.7 FL (ref 9–12.9)
POTASSIUM SERPL-SCNC: 3.7 MMOL/L (ref 3.6–5.5)
PROT SERPL-MCNC: 6.9 G/DL (ref 6–8.2)
PROT UR QL STRIP: ABNORMAL MG/DL
RBC # BLD AUTO: 4.07 M/UL (ref 4.2–5.4)
RBC UR QL AUTO: NEGATIVE
SODIUM SERPL-SCNC: 136 MMOL/L (ref 135–145)
SP GR UR: 1.01
URATE SERPL-MCNC: 3.1 MG/DL (ref 1.9–8.2)
WBC # BLD AUTO: 8.3 K/UL (ref 4.8–10.8)

## 2018-10-15 PROCEDURE — 84550 ASSAY OF BLOOD/URIC ACID: CPT

## 2018-10-15 PROCEDURE — 80053 COMPREHEN METABOLIC PANEL: CPT

## 2018-10-15 PROCEDURE — 81002 URINALYSIS NONAUTO W/O SCOPE: CPT

## 2018-10-15 PROCEDURE — 84156 ASSAY OF PROTEIN URINE: CPT

## 2018-10-15 PROCEDURE — 59025 FETAL NON-STRESS TEST: CPT

## 2018-10-15 PROCEDURE — 700102 HCHG RX REV CODE 250 W/ 637 OVERRIDE(OP): Performed by: NURSE PRACTITIONER

## 2018-10-15 PROCEDURE — A9270 NON-COVERED ITEM OR SERVICE: HCPCS | Performed by: NURSE PRACTITIONER

## 2018-10-15 PROCEDURE — 82570 ASSAY OF URINE CREATININE: CPT

## 2018-10-15 PROCEDURE — 36415 COLL VENOUS BLD VENIPUNCTURE: CPT

## 2018-10-15 PROCEDURE — 76815 OB US LIMITED FETUS(S): CPT

## 2018-10-15 PROCEDURE — 85025 COMPLETE CBC W/AUTO DIFF WBC: CPT

## 2018-10-15 RX ORDER — ACETAMINOPHEN 325 MG/1
650 TABLET ORAL ONCE
Status: COMPLETED | OUTPATIENT
Start: 2018-10-15 | End: 2018-10-15

## 2018-10-15 RX ADMIN — ACETAMINOPHEN 650 MG: 325 TABLET, FILM COATED ORAL at 13:18

## 2018-10-15 NOTE — PROGRESS NOTES
1220-pt presents from the office for PIH work up due to elevated pressures in the office, JOSH Melchor CNM call ahead with orders, no c/o leaking, bleeding, or uc's, states baby is moving normally, pt states that she does have a headache, placed on external monitors, vs taken and set for q 10 min, labs drawn and sent, denies visual changes and RUQ pain, reflexes normal, no clonus, no edema in lower extremities  1415-TC JOSH Melchor CNM, will call back soon,   1430-TC JOSH Melchor CNM, reviewed labs and pressures, report given, discharge order received, wants pt to come back to LND if pressures at home >160/110  1440-pt discharged home with labor and PIH precautions, verbalized understanding, left ambulatory on her own

## 2018-10-16 LAB
CREAT UR-MCNC: 96.4 MG/DL
PROT UR-MCNC: 14.4 MG/DL (ref 0–15)
PROT/CREAT UR: 149 MG/G (ref 10–107)

## 2018-10-24 ENCOUNTER — APPOINTMENT (OUTPATIENT)
Dept: OBGYN | Facility: MEDICAL CENTER | Age: 23
End: 2018-10-24
Payer: MEDICAID

## 2018-10-24 ENCOUNTER — HOSPITAL ENCOUNTER (INPATIENT)
Facility: MEDICAL CENTER | Age: 23
LOS: 3 days | End: 2018-10-27
Admitting: FAMILY MEDICINE
Payer: MEDICAID

## 2018-10-24 LAB
ALBUMIN SERPL BCP-MCNC: 3.5 G/DL (ref 3.2–4.9)
ALBUMIN/GLOB SERPL: 1.1 G/DL
ALP SERPL-CCNC: 196 U/L (ref 30–99)
ALT SERPL-CCNC: 9 U/L (ref 2–50)
ANION GAP SERPL CALC-SCNC: 11 MMOL/L (ref 0–11.9)
APPEARANCE UR: CLEAR
AST SERPL-CCNC: 13 U/L (ref 12–45)
BASOPHILS # BLD AUTO: 0.4 % (ref 0–1.8)
BASOPHILS # BLD: 0.03 K/UL (ref 0–0.12)
BILIRUB SERPL-MCNC: 0.4 MG/DL (ref 0.1–1.5)
BUN SERPL-MCNC: 8 MG/DL (ref 8–22)
CALCIUM SERPL-MCNC: 9.3 MG/DL (ref 8.5–10.5)
CHLORIDE SERPL-SCNC: 106 MMOL/L (ref 96–112)
CO2 SERPL-SCNC: 19 MMOL/L (ref 20–33)
COLOR UR AUTO: YELLOW
CREAT SERPL-MCNC: 0.7 MG/DL (ref 0.5–1.4)
CREAT UR-MCNC: 96.8 MG/DL
EOSINOPHIL # BLD AUTO: 0.08 K/UL (ref 0–0.51)
EOSINOPHIL NFR BLD: 1 % (ref 0–6.9)
ERYTHROCYTE [DISTWIDTH] IN BLOOD BY AUTOMATED COUNT: 37.5 FL (ref 35.9–50)
GLOBULIN SER CALC-MCNC: 3.2 G/DL (ref 1.9–3.5)
GLUCOSE SERPL-MCNC: 89 MG/DL (ref 65–99)
GLUCOSE UR QL STRIP.AUTO: NEGATIVE MG/DL
HCT VFR BLD AUTO: 30.2 % (ref 37–47)
HGB BLD-MCNC: 10.1 G/DL (ref 12–16)
HOLDING TUBE BB 8507: NORMAL
IMM GRANULOCYTES # BLD AUTO: 0.06 K/UL (ref 0–0.11)
IMM GRANULOCYTES NFR BLD AUTO: 0.7 % (ref 0–0.9)
KETONES UR QL STRIP.AUTO: NEGATIVE MG/DL
LEUKOCYTE ESTERASE UR QL STRIP.AUTO: NEGATIVE
LYMPHOCYTES # BLD AUTO: 1.58 K/UL (ref 1–4.8)
LYMPHOCYTES NFR BLD: 19.5 % (ref 22–41)
MCH RBC QN AUTO: 25.8 PG (ref 27–33)
MCHC RBC AUTO-ENTMCNC: 33.4 G/DL (ref 33.6–35)
MCV RBC AUTO: 77 FL (ref 81.4–97.8)
MONOCYTES # BLD AUTO: 0.64 K/UL (ref 0–0.85)
MONOCYTES NFR BLD AUTO: 7.9 % (ref 0–13.4)
NEUTROPHILS # BLD AUTO: 5.7 K/UL (ref 2–7.15)
NEUTROPHILS NFR BLD: 70.5 % (ref 44–72)
NITRITE UR QL STRIP.AUTO: NEGATIVE
NRBC # BLD AUTO: 0 K/UL
NRBC BLD-RTO: 0 /100 WBC
PH UR STRIP.AUTO: 7 [PH]
PLATELET # BLD AUTO: 316 K/UL (ref 164–446)
PMV BLD AUTO: 11.1 FL (ref 9–12.9)
POTASSIUM SERPL-SCNC: 3.8 MMOL/L (ref 3.6–5.5)
PROT SERPL-MCNC: 6.7 G/DL (ref 6–8.2)
PROT UR QL STRIP: NEGATIVE MG/DL
PROT UR-MCNC: 9.5 MG/DL (ref 0–15)
PROT/CREAT UR: 98 MG/G (ref 10–107)
RBC # BLD AUTO: 3.92 M/UL (ref 4.2–5.4)
RBC UR QL AUTO: ABNORMAL
SODIUM SERPL-SCNC: 136 MMOL/L (ref 135–145)
SP GR UR: 1.01
TREPONEMA PALLIDUM IGG+IGM AB [PRESENCE] IN SERUM OR PLASMA BY IMMUNOASSAY: NON REACTIVE
WBC # BLD AUTO: 8.1 K/UL (ref 4.8–10.8)

## 2018-10-24 PROCEDURE — 700105 HCHG RX REV CODE 258

## 2018-10-24 PROCEDURE — 86780 TREPONEMA PALLIDUM: CPT

## 2018-10-24 PROCEDURE — 770002 HCHG ROOM/CARE - OB PRIVATE (112)

## 2018-10-24 PROCEDURE — C1726 CATH, BAL DIL, NON-VASCULAR: HCPCS

## 2018-10-24 PROCEDURE — 84156 ASSAY OF PROTEIN URINE: CPT

## 2018-10-24 PROCEDURE — 36415 COLL VENOUS BLD VENIPUNCTURE: CPT

## 2018-10-24 PROCEDURE — 82570 ASSAY OF URINE CREATININE: CPT

## 2018-10-24 PROCEDURE — 80053 COMPREHEN METABOLIC PANEL: CPT

## 2018-10-24 PROCEDURE — 85025 COMPLETE CBC W/AUTO DIFF WBC: CPT

## 2018-10-24 PROCEDURE — 87389 HIV-1 AG W/HIV-1&-2 AB AG IA: CPT

## 2018-10-24 PROCEDURE — 81002 URINALYSIS NONAUTO W/O SCOPE: CPT

## 2018-10-24 RX ORDER — LABETALOL HYDROCHLORIDE 5 MG/ML
20-40 INJECTION, SOLUTION INTRAVENOUS PRN
Status: DISCONTINUED | OUTPATIENT
Start: 2018-10-24 | End: 2018-10-26 | Stop reason: HOSPADM

## 2018-10-24 RX ORDER — HYDRALAZINE HYDROCHLORIDE 20 MG/ML
5-10 INJECTION INTRAMUSCULAR; INTRAVENOUS PRN
Status: DISCONTINUED | OUTPATIENT
Start: 2018-10-24 | End: 2018-10-26 | Stop reason: HOSPADM

## 2018-10-24 RX ORDER — SODIUM CHLORIDE, SODIUM LACTATE, POTASSIUM CHLORIDE, CALCIUM CHLORIDE 600; 310; 30; 20 MG/100ML; MG/100ML; MG/100ML; MG/100ML
INJECTION, SOLUTION INTRAVENOUS
Status: COMPLETED
Start: 2018-10-24 | End: 2018-10-24

## 2018-10-24 RX ADMIN — SODIUM CHLORIDE, POTASSIUM CHLORIDE, SODIUM LACTATE AND CALCIUM CHLORIDE 1000 ML: 600; 310; 30; 20 INJECTION, SOLUTION INTRAVENOUS at 21:00

## 2018-10-24 ASSESSMENT — LIFESTYLE VARIABLES
ALCOHOL_USE: NO
EVER_SMOKED: NEVER

## 2018-10-24 ASSESSMENT — COPD QUESTIONNAIRES
IN THE PAST 12 MONTHS DO YOU DO LESS THAN YOU USED TO BECAUSE OF YOUR BREATHING PROBLEMS: DISAGREE/UNSURE
DO YOU EVER COUGH UP ANY MUCUS OR PHLEGM?: NO/ONLY WITH OCCASIONAL COLDS OR INFECTIONS
HAVE YOU SMOKED AT LEAST 100 CIGARETTES IN YOUR ENTIRE LIFE: NO/DON'T KNOW
DURING THE PAST 4 WEEKS HOW MUCH DID YOU FEEL SHORT OF BREATH: NONE/LITTLE OF THE TIME

## 2018-10-24 ASSESSMENT — PATIENT HEALTH QUESTIONNAIRE - PHQ9
2. FEELING DOWN, DEPRESSED, IRRITABLE, OR HOPELESS: NOT AT ALL
1. LITTLE INTEREST OR PLEASURE IN DOING THINGS: NOT AT ALL
SUM OF ALL RESPONSES TO PHQ9 QUESTIONS 1 AND 2: 0

## 2018-10-25 LAB — HIV 1+2 AB+HIV1 P24 AG SERPL QL IA: NON REACTIVE

## 2018-10-25 PROCEDURE — 10H07YZ INSERTION OF OTHER DEVICE INTO PRODUCTS OF CONCEPTION, VIA NATURAL OR ARTIFICIAL OPENING: ICD-10-PCS | Performed by: OBSTETRICS & GYNECOLOGY

## 2018-10-25 PROCEDURE — 700111 HCHG RX REV CODE 636 W/ 250 OVERRIDE (IP): Performed by: STUDENT IN AN ORGANIZED HEALTH CARE EDUCATION/TRAINING PROGRAM

## 2018-10-25 PROCEDURE — 10907ZC DRAINAGE OF AMNIOTIC FLUID, THERAPEUTIC FROM PRODUCTS OF CONCEPTION, VIA NATURAL OR ARTIFICIAL OPENING: ICD-10-PCS | Performed by: OBSTETRICS & GYNECOLOGY

## 2018-10-25 PROCEDURE — 700105 HCHG RX REV CODE 258: Performed by: FAMILY MEDICINE

## 2018-10-25 PROCEDURE — 700111 HCHG RX REV CODE 636 W/ 250 OVERRIDE (IP)

## 2018-10-25 PROCEDURE — 700105 HCHG RX REV CODE 258: Performed by: STUDENT IN AN ORGANIZED HEALTH CARE EDUCATION/TRAINING PROGRAM

## 2018-10-25 PROCEDURE — 59409 OBSTETRICAL CARE: CPT

## 2018-10-25 PROCEDURE — A9270 NON-COVERED ITEM OR SERVICE: HCPCS

## 2018-10-25 PROCEDURE — 700102 HCHG RX REV CODE 250 W/ 637 OVERRIDE(OP)

## 2018-10-25 PROCEDURE — 770002 HCHG ROOM/CARE - OB PRIVATE (112)

## 2018-10-25 PROCEDURE — 304965 HCHG RECOVERY SERVICES

## 2018-10-25 PROCEDURE — 700105 HCHG RX REV CODE 258

## 2018-10-25 RX ORDER — ONDANSETRON 2 MG/ML
INJECTION INTRAMUSCULAR; INTRAVENOUS
Status: COMPLETED
Start: 2018-10-25 | End: 2018-10-25

## 2018-10-25 RX ORDER — ROPIVACAINE HYDROCHLORIDE 2 MG/ML
INJECTION, SOLUTION EPIDURAL; INFILTRATION; PERINEURAL CONTINUOUS
Status: DISCONTINUED | OUTPATIENT
Start: 2018-10-25 | End: 2018-10-26 | Stop reason: HOSPADM

## 2018-10-25 RX ORDER — SODIUM CHLORIDE, SODIUM LACTATE, POTASSIUM CHLORIDE, CALCIUM CHLORIDE 600; 310; 30; 20 MG/100ML; MG/100ML; MG/100ML; MG/100ML
INJECTION, SOLUTION INTRAVENOUS
Status: COMPLETED
Start: 2018-10-25 | End: 2018-10-25

## 2018-10-25 RX ORDER — ACETAMINOPHEN 325 MG/1
650 TABLET ORAL EVERY 6 HOURS PRN
Status: DISCONTINUED | OUTPATIENT
Start: 2018-10-25 | End: 2018-10-27 | Stop reason: HOSPADM

## 2018-10-25 RX ORDER — SODIUM CHLORIDE, SODIUM LACTATE, POTASSIUM CHLORIDE, CALCIUM CHLORIDE 600; 310; 30; 20 MG/100ML; MG/100ML; MG/100ML; MG/100ML
INJECTION, SOLUTION INTRAVENOUS CONTINUOUS
Status: DISCONTINUED | OUTPATIENT
Start: 2018-10-25 | End: 2018-10-27 | Stop reason: HOSPADM

## 2018-10-25 RX ORDER — ROPIVACAINE HYDROCHLORIDE 2 MG/ML
INJECTION, SOLUTION EPIDURAL; INFILTRATION; PERINEURAL
Status: COMPLETED
Start: 2018-10-25 | End: 2018-10-25

## 2018-10-25 RX ORDER — ONDANSETRON 2 MG/ML
4 INJECTION INTRAMUSCULAR; INTRAVENOUS EVERY 4 HOURS PRN
Status: DISCONTINUED | OUTPATIENT
Start: 2018-10-25 | End: 2018-10-27 | Stop reason: HOSPADM

## 2018-10-25 RX ORDER — OXYTOCIN 10 [USP'U]/ML
INJECTION, SOLUTION INTRAMUSCULAR; INTRAVENOUS
Status: COMPLETED
Start: 2018-10-25 | End: 2018-10-25

## 2018-10-25 RX ORDER — SODIUM CHLORIDE, SODIUM LACTATE, POTASSIUM CHLORIDE, AND CALCIUM CHLORIDE .6; .31; .03; .02 G/100ML; G/100ML; G/100ML; G/100ML
1000 INJECTION, SOLUTION INTRAVENOUS
Status: DISCONTINUED | OUTPATIENT
Start: 2018-10-25 | End: 2018-10-25 | Stop reason: HOSPADM

## 2018-10-25 RX ORDER — PENICILLIN G POTASSIUM 5000000 [IU]/1
5 INJECTION, POWDER, FOR SOLUTION INTRAMUSCULAR; INTRAVENOUS ONCE
Status: COMPLETED | OUTPATIENT
Start: 2018-10-25 | End: 2018-10-25

## 2018-10-25 RX ORDER — SODIUM CHLORIDE, SODIUM LACTATE, POTASSIUM CHLORIDE, AND CALCIUM CHLORIDE .6; .31; .03; .02 G/100ML; G/100ML; G/100ML; G/100ML
250 INJECTION, SOLUTION INTRAVENOUS PRN
Status: DISCONTINUED | OUTPATIENT
Start: 2018-10-25 | End: 2018-10-25 | Stop reason: HOSPADM

## 2018-10-25 RX ORDER — MISOPROSTOL 200 UG/1
TABLET ORAL
Status: COMPLETED
Start: 2018-10-25 | End: 2018-10-25

## 2018-10-25 RX ADMIN — OXYTOCIN 10 UNITS: 10 INJECTION, SOLUTION INTRAMUSCULAR; INTRAVENOUS at 20:33

## 2018-10-25 RX ADMIN — ONDANSETRON 4 MG: 2 INJECTION INTRAMUSCULAR; INTRAVENOUS at 10:01

## 2018-10-25 RX ADMIN — SODIUM CHLORIDE, POTASSIUM CHLORIDE, SODIUM LACTATE AND CALCIUM CHLORIDE: 600; 310; 30; 20 INJECTION, SOLUTION INTRAVENOUS at 14:29

## 2018-10-25 RX ADMIN — ROPIVACAINE HYDROCHLORIDE 100 ML: 2 INJECTION, SOLUTION EPIDURAL; INFILTRATION; PERINEURAL at 14:56

## 2018-10-25 RX ADMIN — Medication 1 MILLI-UNITS/MIN: at 08:43

## 2018-10-25 RX ADMIN — SODIUM CHLORIDE, POTASSIUM CHLORIDE, SODIUM LACTATE AND CALCIUM CHLORIDE 1000 ML: 600; 310; 30; 20 INJECTION, SOLUTION INTRAVENOUS at 08:42

## 2018-10-25 RX ADMIN — MISOPROSTOL 800 MCG: 200 TABLET ORAL at 20:34

## 2018-10-25 RX ADMIN — PENICILLIN G POTASSIUM 5 MILLION UNITS: 5000000 POWDER, FOR SOLUTION INTRAMUSCULAR; INTRAPLEURAL; INTRATHECAL; INTRAVENOUS at 08:41

## 2018-10-25 RX ADMIN — SODIUM CHLORIDE, POTASSIUM CHLORIDE, SODIUM LACTATE AND CALCIUM CHLORIDE: 600; 310; 30; 20 INJECTION, SOLUTION INTRAVENOUS at 14:58

## 2018-10-25 RX ADMIN — SODIUM CHLORIDE 2.5 MILLION UNITS: 9 INJECTION, SOLUTION INTRAVENOUS at 12:19

## 2018-10-25 RX ADMIN — SODIUM CHLORIDE 2.5 MILLION UNITS: 9 INJECTION, SOLUTION INTRAVENOUS at 17:45

## 2018-10-25 ASSESSMENT — PAIN SCALES - GENERAL: PAINLEVEL_OUTOF10: 1

## 2018-10-25 NOTE — CARE PLAN
Problem: Infection  Goal: Will remain free from infection    Intervention: Implement standard precautions and perform hand washing before and after patient contact  RN performs hand washing before and after pt contact, Pt and family educated on importance of hand washing.      Problem: Pain  Goal: Alleviation of Pain or a reduction in pain to the patient's comfort goal    Intervention: Pain Management - Epidural/Spinal  Pt educated on epidural, questions answered at this time, will notify RN when ready for epidural.

## 2018-10-25 NOTE — H&P
[facility] LABOR AND DELIVERY HISTORY AND PHYSICAL    PATIENT ID:  NAME:  Leila Navarro  MRN:               [id number]  YOB: 1995    CC:  IOL for Gestational Hypertension     HPI:  Leila Navarro is a 23 y.o. female  at 37w0d by LMP confirmed by 1st trimester ultrasound (per patient) . Estimated Date of Delivery: 18  Patient presents complaining of no uterine contractions, with no loss of fluid. + fetal movement.  no vaginal bleeding.    ROS: Patient denies any fever chills, nausea, vomiting, headache, chest pain, shortness of breath, or dysuria or unusual swelling of hands or feet.     Prenatal Care: Late transfer to [facility] from [place]     Prenatal Complications:  Gestational HTN     Prenatal Labs:   HepBsAg: - HIV: - (per chart no records of lab) Rubella: imm   RPR: - (per chart no records of lab) PAP: Not in records  GBS: +   GC/CT: - B+/ Ab - Quad Screen:    No results for input(s): WBC, RBC, HEMOGLOBIN, HEMATOCRIT, MCV, MCH, RDW, PLATELETCT, MPV, NEUTSPOLYS, LYMPHOCYTES, MONOCYTES, EOSINOPHILS, BASOPHILS, RBCMORPHOLO in the last 72 hours.  No results for input(s): SODIUM, POTASSIUM, CHLORIDE, CO2, GLUCOSE, BUN, CPKTOTAL in the last 72 hours.       IMAGING:  OB ultrasound:   2018 5:39 PM    HISTORY/REASON FOR EXAM:  Pain      TECHNIQUE/EXAM DESCRIPTION: Real-time OB transvaginal pelvis ultrasound was performed with grey-scale, color and duplex Doppler imaging.    COMPARISON:  None.    FINDINGS:  There is an intrauterine pregnancy. The fetus is in breech position. Fetal heart rate is 162 bpm.    Placenta is posterior.    ALANIS 8.7 cm.    Cervix measures 4.54 cm transvaginally. No placenta previa is noted. The edge of the placenta is 2.87 cm from the internal cervical os.    Limited fetal biometry:  BPD 5.07 cm (21 weeks 3 days)  HC 20.77 cm (22 weeks 6 days)  AC 19.63 cm (24 weeks 2 days)  FL 4.07 cm (23 weeks 1 day)  HL 3.91 cm (23 weeks 6 days)  Estimated gestational age by this  ultrasound is 23 weeks and 1 day with estimated delivery date 2018.  Estimated fetal weight 610 g.    No maternal adnexal mass is identified.   Impression       Intrauterine pregnancy with gestational age by this ultrasound of 23 weeks and 1 day with estimated delivery date 2018.    ALANIS 8.7 cm.    Low lying placenta with the edge of the placenta 2.87 cm from the internal cervical os.       10/10/2018 11:43 AM    HISTORY/REASON FOR EXAM:  Evaluate fetal growth    TECHNIQUE/EXAM DESCRIPTION: OB limited ultrasound.    COMPARISON:  OB pelvic ultrasound 2018    FINDINGS:  Fetal Lie:  Vertex  LMP:  2018  Clinical SUBHA by LMP:  2018    Placenta (Location):  Posterior  Placenta Previa: No    Amniotic Fluid Volume:  ALANIS = 9.0 cm    Fetal Heart Rate:  163 bpm    Cervical Length:  4.41 cm transabdominal      Fetal Biometry  BPD    8.44 cm, 34w 0d  HC    31.86 cm, 35w 6d  AC    29.82 cm, 33w 6d  Femur Length    6.74 cm, 34w 5d  Humerus Length    6.14 cm, 35w 4d    EGA by this US:  34w 6d  SUBHA by this US: 11/15/2018  SUBHA by 1st US:  2018    Estimated Fetal Weight:  2390 g    Comments:  Limited examination.   Impression       Single intrauterine pregnancy of an estimated gestational age of 34w 6d with an estimated date of delivery of 11/15/2018.    Limited examination.     10/24/18: Bedside US by this writer   VTX position with OA presentation     POB Hx:    Gestational HTN in this pregnancy     PMH:    None significant     Current Outpatient Medications:  Prenatal Vitamin     PSH:    No past surgical history on file.      Allergies:   No Known Allergies    SH:  Social History     Social History   • Marital status: Single     Spouse name: N/A   • Number of children: N/A   • Years of education: N/A     Occupational History   • Not on file.     Social History Main Topics   • Smoking status: Never Smoker   • Smokeless tobacco: Never Used   • Alcohol use No   • Drug use: No   • Sexual activity: Not  "on file     Other Topics Concern   • Not on file     Social History Narrative   • No narrative on file         PHYSICAL EXAM:  Vitals:    10/24/18 2011 10/24/18 2034   BP: 139/96 138/90   Pulse: (!) 116 (!) 102   Temp: 36.8 °C (98.2 °F)    TempSrc: Temporal    Weight: 78 kg (172 lb)    Height: 1.702 m (5' 7\")      General: No acute distress, resting comfortably in bed.  HEENT: normocephalic, nontraumatic, PERRLA, EOMI  Cardiovascular: Heart RRR with no murmurs, rubs or gallops. Distal Pulses 2+  Respiratory: symmetric chest expansion, lungs CTA bilaterally with no wheezes rales or  rhonci  Abdomen: gravid, nontender  Musculoskeletal: strength 5/5 in four extremities  Neuro: non focal with no numbness, tingling or changes in sensation    SVE: 1/50%/VTX, intact  Zion: No contractions    EFM: Baseline 150 ,+accels, -decels, Category I   EFW: 3000gm,VTXpresentation    A/P: Intrauterine pregancy at 37w0d weeks here for IOL for gestational hypertension.   1. IUP: GBS+.  EFW 3000.  Presentation by ultrasound.  Category I tracing.  2. Will start penicillin when the patient is either in active labor or ruptures   3. IOL with Cooks balloon placed under direct visualization with a speculum   4. Anticipating   5. Patient was a late transfer from [place] and we have incomplete labs today will order RPR and HIV     Discussed case with Glo Rowan M.D., [facility] Attending    Gunner Newton M.D.   PGY-2  [facility]   [phone]    "

## 2018-10-25 NOTE — CARE PLAN
Problem: Knowledge Deficit  Goal: Patient/Support person demonstrates understanding regarding the progression of labor, available options and participates in decision-making process  Outcome: PROGRESSING AS EXPECTED  POC reviewed with pt and family members; and understanding verbalized.    Problem: Pain  Goal: Alleviation of Pain or a reduction in pain to the patient's comfort goal  Outcome: PROGRESSING AS EXPECTED  Pt denies pain at this time but plans to get an epidural later.

## 2018-10-25 NOTE — PROGRESS NOTES
"      L&D Progress Note      Subjective:  Pt more comfortable, not feeling any contractions.     Objective  VS:/90   Pulse 78   Temp 36.9 °C (98.4 °F) (Temporal)   Ht 1.702 m (5' 7\")   Wt 78 kg (172 lb)   BMI 26.94 kg/m²   FHTs: baseline 150 + accels, no decels moderate variability  [place]: contrxns: None   Pitocin: None   SVE: baloon removed, /-1/VTX, intact     A/P  1.  Term IUP -- labor  2.  Cat I FHTs  3.  Continue to monitor  4.  Anticipate   5.  Pressures mild range   6. Mann score 7, will allow patient to eat breakfast and then will start Pitocin 1 by 2 every 30, will also begin pen G for GBS ppx.       Gunner Newton M.D.   PGY-2  [facility]   [phone]    This plan was discussed with attending Dr. Glo Rowan.  "

## 2018-10-25 NOTE — PROGRESS NOTES
"S: Feeling painful contractions every 4 minutes, no LOF, no decreased FM.  No HA, no vision changes.    O:  VS: /87   Pulse 76   Temp 36.8 °C (98.2 °F) (Temporal)   Ht 1.702 m (5' 7\")   Wt 78 kg (172 lb)   BMI 26.94 kg/m²   FHTs: baseline 140s, accels to 160s , no decels, Cat 1    A/P:  - term IUP  - Cat 1 FHTs  - continue to monitor   - cervical check in approx 1 hour  "

## 2018-10-25 NOTE — PROGRESS NOTES
"-0700: report received from LUIS Palumbo RN. Pt in bed, balloon still in place. POC reviewed, all questions answered at this time.  0720: Dr Newton at bedside, Balloon D/C'd (60mL/60mL), SVE 4/50/-1. Orders to start pitocin after breakfast.  0820: Pt eating breakfast, told to call out when finished.  0845: pt finished eating, Pitocin started, ABX started (see MAR).  1000: pt feeling nauseous, RN at bedside, Zofran given (see MAR)  1200: Dr Qiu called for update on pt, will be over in a few hours to evaluate pt.  1340: Dr Qiu in dept, will come back and check on pt around 1630.  1415: Pt called out stating \"she bled a lot.\" RN at bedside to eval, clot noted in bed, SVE 4-5/70/-1. Pt requesting epidural at this time, bolus initiated.  1440: Dr Broderick at bedside for epidural placement.  1520: Dr Rowan in dept to evaluate pt, updated on status, no new orders.  1620: Dr Qiu at bedside with Dr Rowan. SVE 5/70/-1, AROM 1628-clear, baby had PD after AROM, see flowsheet for interventions, IUPC placed at 1638.  1735: Pt called out, feeling more pressure, Dr Qui at BS for SVE:5-6/80/-1.   "

## 2018-10-25 NOTE — PROGRESS NOTES
2000_ Pt is  here for IOL for gestational HTN. Pt denies any vision changes, HA or epigastric pain. Pt states +FM and denies VB or LOF. EFM and TOCO applied. VSS.  _ SVE 2020_ Dr Newton @ bedside to speak with pt. Vertex presentation verified by US.  _ Cook's cervical balloon (uterine 60 ml/vaginal 60 ml) placed by Dr Newton. Dr Rowan @ bedside as well.  700_ Report to DEAN Jacobs and RYAN Main, RNs.

## 2018-10-26 LAB
ERYTHROCYTE [DISTWIDTH] IN BLOOD BY AUTOMATED COUNT: 38.4 FL (ref 35.9–50)
HCT VFR BLD AUTO: 29.4 % (ref 37–47)
HGB BLD-MCNC: 9.5 G/DL (ref 12–16)
MCH RBC QN AUTO: 24.9 PG (ref 27–33)
MCHC RBC AUTO-ENTMCNC: 32.3 G/DL (ref 33.6–35)
MCV RBC AUTO: 77.2 FL (ref 81.4–97.8)
PLATELET # BLD AUTO: 280 K/UL (ref 164–446)
PMV BLD AUTO: 10.2 FL (ref 9–12.9)
RBC # BLD AUTO: 3.81 M/UL (ref 4.2–5.4)
WBC # BLD AUTO: 15.1 K/UL (ref 4.8–10.8)

## 2018-10-26 PROCEDURE — A9270 NON-COVERED ITEM OR SERVICE: HCPCS | Performed by: STUDENT IN AN ORGANIZED HEALTH CARE EDUCATION/TRAINING PROGRAM

## 2018-10-26 PROCEDURE — 770002 HCHG ROOM/CARE - OB PRIVATE (112)

## 2018-10-26 PROCEDURE — 700102 HCHG RX REV CODE 250 W/ 637 OVERRIDE(OP): Performed by: STUDENT IN AN ORGANIZED HEALTH CARE EDUCATION/TRAINING PROGRAM

## 2018-10-26 PROCEDURE — 85027 COMPLETE CBC AUTOMATED: CPT

## 2018-10-26 PROCEDURE — 36415 COLL VENOUS BLD VENIPUNCTURE: CPT

## 2018-10-26 PROCEDURE — 700112 HCHG RX REV CODE 229: Performed by: STUDENT IN AN ORGANIZED HEALTH CARE EDUCATION/TRAINING PROGRAM

## 2018-10-26 RX ORDER — ONDANSETRON 2 MG/ML
4 INJECTION INTRAMUSCULAR; INTRAVENOUS EVERY 6 HOURS PRN
Status: DISCONTINUED | OUTPATIENT
Start: 2018-10-26 | End: 2018-10-27 | Stop reason: HOSPADM

## 2018-10-26 RX ORDER — ACETAMINOPHEN 325 MG/1
325 TABLET ORAL EVERY 4 HOURS PRN
Status: DISCONTINUED | OUTPATIENT
Start: 2018-10-26 | End: 2018-10-27 | Stop reason: HOSPADM

## 2018-10-26 RX ORDER — HYDROCODONE BITARTRATE AND ACETAMINOPHEN 5; 325 MG/1; MG/1
1 TABLET ORAL EVERY 4 HOURS PRN
Status: DISCONTINUED | OUTPATIENT
Start: 2018-10-26 | End: 2018-10-27 | Stop reason: HOSPADM

## 2018-10-26 RX ORDER — DOCUSATE SODIUM 100 MG/1
100 CAPSULE, LIQUID FILLED ORAL 2 TIMES DAILY PRN
Status: DISCONTINUED | OUTPATIENT
Start: 2018-10-26 | End: 2018-10-27 | Stop reason: HOSPADM

## 2018-10-26 RX ORDER — ONDANSETRON 4 MG/1
4 TABLET, ORALLY DISINTEGRATING ORAL EVERY 6 HOURS PRN
Status: DISCONTINUED | OUTPATIENT
Start: 2018-10-26 | End: 2018-10-27 | Stop reason: HOSPADM

## 2018-10-26 RX ORDER — VITAMIN A ACETATE, BETA CAROTENE, ASCORBIC ACID, CHOLECALCIFEROL, .ALPHA.-TOCOPHEROL ACETATE, DL-, THIAMINE MONONITRATE, RIBOFLAVIN, NIACINAMIDE, PYRIDOXINE HYDROCHLORIDE, FOLIC ACID, CYANOCOBALAMIN, CALCIUM CARBONATE, FERROUS FUMARATE, ZINC OXIDE, CUPRIC OXIDE 3080; 12; 120; 400; 1; 1.84; 3; 20; 22; 920; 25; 200; 27; 10; 2 [IU]/1; UG/1; MG/1; [IU]/1; MG/1; MG/1; MG/1; MG/1; MG/1; [IU]/1; MG/1; MG/1; MG/1; MG/1; MG/1
1 TABLET, FILM COATED ORAL EVERY MORNING
Status: DISCONTINUED | OUTPATIENT
Start: 2018-10-26 | End: 2018-10-27 | Stop reason: HOSPADM

## 2018-10-26 RX ORDER — IBUPROFEN 600 MG/1
600 TABLET ORAL EVERY 6 HOURS PRN
Status: DISCONTINUED | OUTPATIENT
Start: 2018-10-26 | End: 2018-10-26

## 2018-10-26 RX ADMIN — IBUPROFEN 600 MG: 600 TABLET, FILM COATED ORAL at 04:05

## 2018-10-26 RX ADMIN — ACETAMINOPHEN 650 MG: 325 TABLET, FILM COATED ORAL at 04:05

## 2018-10-26 RX ADMIN — Medication 1 TABLET: at 06:54

## 2018-10-26 RX ADMIN — DOCUSATE SODIUM 100 MG: 100 CAPSULE, LIQUID FILLED ORAL at 10:15

## 2018-10-26 ASSESSMENT — PAIN SCALES - GENERAL
PAINLEVEL_OUTOF10: 0
PAINLEVEL_OUTOF10: 0
PAINLEVEL_OUTOF10: 2
PAINLEVEL_OUTOF10: 5

## 2018-10-26 NOTE — PROGRESS NOTES
L&D Progress Note    Name:   Leila Navarro   Date/Time:  10/25/2018 7:43 PM  Gestational Age:  37w1d  Admit Date:   10/24/2018  Admitting Dx:   Pregnancy  Pregnancy  Preeclampsia    Subjective:  Uterine contractions: yes  Pain:    no  Complaints:   no   Fetal movement:  normal  Vaginal bleeding: . no    Objective:   Vitals:    10/25/18 1837 10/25/18 1856 10/25/18 1916 10/25/18 1937   BP: 150/93 135/81 136/99 134/95   Pulse: 73 75 93 (!) 110   Temp:   37.2 °C (99 °F)    TempSrc:   Temporal    SpO2:       Weight:       Height:         Fetal heart variability: moderate  Cervical: complete/0 station  Membranes ruptured: yes  AROM:  yes  Meconium: .  no    IUPC: .   yes  FSE .   no    Meds:   Epidural : .  yes  Magnesium sulfate: . no  Pitocin: .  yes    Labs:  Recent Results (from the past 72 hour(s))   Hold Blood Bank Specimen (Not Tested)    Collection Time: 10/24/18  8:55 PM   Result Value Ref Range    Holding Tube - Bb DONE    CBC WITH DIFFERENTIAL    Collection Time: 10/24/18  8:55 PM   Result Value Ref Range    WBC 8.1 4.8 - 10.8 K/uL    RBC 3.92 (L) 4.20 - 5.40 M/uL    Hemoglobin 10.1 (L) 12.0 - 16.0 g/dL    Hematocrit 30.2 (L) 37.0 - 47.0 %    MCV 77.0 (L) 81.4 - 97.8 fL    MCH 25.8 (L) 27.0 - 33.0 pg    MCHC 33.4 (L) 33.6 - 35.0 g/dL    RDW 37.5 35.9 - 50.0 fL    Platelet Count 316 164 - 446 K/uL    MPV 11.1 9.0 - 12.9 fL    Neutrophils-Polys 70.50 44.00 - 72.00 %    Lymphocytes 19.50 (L) 22.00 - 41.00 %    Monocytes 7.90 0.00 - 13.40 %    Eosinophils 1.00 0.00 - 6.90 %    Basophils 0.40 0.00 - 1.80 %    Immature Granulocytes 0.70 0.00 - 0.90 %    Nucleated RBC 0.00 /100 WBC    Neutrophils (Absolute) 5.70 2.00 - 7.15 K/uL    Lymphs (Absolute) 1.58 1.00 - 4.80 K/uL    Monos (Absolute) 0.64 0.00 - 0.85 K/uL    Eos (Absolute) 0.08 0.00 - 0.51 K/uL    Baso (Absolute) 0.03 0.00 - 0.12 K/uL    Immature Granulocytes (abs) 0.06 0.00 - 0.11 K/uL    NRBC (Absolute) 0.00 K/uL   COMP METABOLIC PANEL    Collection Time:  10/24/18  8:55 PM   Result Value Ref Range    Sodium 136 135 - 145 mmol/L    Potassium 3.8 3.6 - 5.5 mmol/L    Chloride 106 96 - 112 mmol/L    Co2 19 (L) 20 - 33 mmol/L    Anion Gap 11.0 0.0 - 11.9    Glucose 89 65 - 99 mg/dL    Bun 8 8 - 22 mg/dL    Creatinine 0.70 0.50 - 1.40 mg/dL    Calcium 9.3 8.5 - 10.5 mg/dL    AST(SGOT) 13 12 - 45 U/L    ALT(SGPT) 9 2 - 50 U/L    Alkaline Phosphatase 196 (H) 30 - 99 U/L    Total Bilirubin 0.4 0.1 - 1.5 mg/dL    Albumin 3.5 3.2 - 4.9 g/dL    Total Protein 6.7 6.0 - 8.2 g/dL    Globulin 3.2 1.9 - 3.5 g/dL    A-G Ratio 1.1 g/dL   ESTIMATED GFR    Collection Time: 10/24/18  8:55 PM   Result Value Ref Range    GFR If African American >60 >60 mL/min/1.73 m 2    GFR If Non African American >60 >60 mL/min/1.73 m 2   PROTEIN/CREAT RATIO URINE    Collection Time: 10/24/18 10:00 PM   Result Value Ref Range    Total Protein, Urine 9.5 0.0 - 15.0 mg/dL    Creatinine, Random Urine 96.80 mg/dL    Protein Creatinine Ratio 98 10 - 107 mg/g   T.PALLIDUM AB EIA    Collection Time: 10/24/18 10:16 PM   Result Value Ref Range    Syphilis, Treponemal Qual Non Reactive Non Reactive   HIV AG/AB COMBO ASSAY SCREENING    Collection Time: 10/24/18 10:16 PM   Result Value Ref Range    HIV Ag/Ab Combo Assay Non Reactive Non Reactive   POC UA    Collection Time: 10/24/18 10:18 PM   Result Value Ref Range    POC Color Yellow     POC Appearance Clear     POC Glucose Negative Negative mg/dL    POC Ketones Negative Negative mg/dL    POC Specific Gravity 1.015 1.005 - 1.030    POC Blood Large (A) Negative    POC Urine PH 7.0 5.0 - 8.0    POC Protein Negative Negative mg/dL    POC Nitrites Negative Negative    POC Leukocyte Esterase Negative Negative         Assessment:   Gestational Age:   37w1d  Risk Factors:   group B strep colonizer  Labor State:    Active phase labor.  Pregnancy Complications: Gestational HTN  Plan:    Continue present management and anticipate  - currently laboring down    There are  no active problems to display for this patient.      Miguel Sanchez D.O.

## 2018-10-26 NOTE — PROGRESS NOTES
Patient states that she received two capsules of  Tylenol from her mother at 0815. Verified bottle as 500mg per capsule. Advised patient to notify staff when she is need of pain control and not to take medications in addition to those provided by RN

## 2018-10-26 NOTE — PROGRESS NOTES
[facility] LABOR AND DELIVERY PROGRESS NOTE    PATIENT ID:  NAME:  Leila Navarro  MRN:               [id number]  YOB: 1995     23 y.o. female  at 37w1d.    Subjective: Comfortable with epidural.     Objective:    Vitals:    10/25/18 1641 10/25/18 1646 10/25/18 1648 10/25/18 1651   BP:   110/66    Pulse: 80 (!) 103 100 (!) 121   Temp:       TempSrc:       SpO2:       Weight:       Height:           Cervix:  5cm/70%/-3  [place]: Uterine Contractions Q3 minutes.   FHRM: Baseline 140s, mod variability, Accels +, one 3min decel after AROM which responded to intrauterine resuscitation (oxygen, position change), now with moderate variability  Pitocin: Off due to decel; now back on  Pain control: Epidural    AROM: Bloody drainage. 3min decel afterwards. IUPC placed as ctx difficult to  with toco.    Assessment: 23 y.o. female    at 37w1d.    Plan:   1. Labor: Induction; inadequate uterine contractions.  2. Continue augmentation with pitocin.  3. IUPC in place.   4. IUP:  EFW 3000, Category 2 tracing, vertex presentation.  GBS pos s/p 2 doses abx  5. Anticipate

## 2018-10-26 NOTE — PROGRESS NOTES
2230 Admitted from L and D per wheelchair, pt oriented to room instructed her to call the first time go to the bathroom, Assessment done denies pain at this time, Admission care rendered.

## 2018-10-26 NOTE — LACTATION NOTE
Baby showing hunger cues. Assisted with positioning baby tummy to tummy and nipple to nose. MOB taught how to hand express, and baby opens wide and latches deeply and comfortably for mother. MOB taught how to distinguish sucks and swallows while baby at breast. Encouraged to let baby feed with out time restrictions and how to gently remind baby to keep suckling at breast. Swallows observed. Encouraged to keep baby skin to skin between feedings and call for latch help when needed. Skin to skin with dad encouraged as well.

## 2018-10-26 NOTE — L&D DELIVERY NOTE
VAGINAL DELIVERY PRODEDURE NOTE    PATIENT ID:  NAME:  Leila Navarro  MRN:               [id number]  YOB: 1995    Indication: induction of labor for gestational hypertension    On 10/25/2018 at 20:19, this 23 y.o., now 37w1d , GBS + female delivered via  under epidural anesthesia a viable female infant weight pending with APGAR scores of 8 and 9 at one and five minutes. There was a single nuchal cord which was not reduced and infant was delivered through the cord and was bulb suctioned at delivery. IV pitocin was started at delivery of the infant but the IV infiltrated immediately.     Cord was doubly clamped, cut by FOB and infant handed to RN in attendance. An intact Burk placenta  was delivered spontaneously with 3 vessel cord. Upon vaginal exam, there were bilateral periurethral and labia minora abrasions that were hemostatic and required no repair. Estimated blood loss was 250cc. We gave 800 mcg of Cytotec and 10 units IM Pitocin. Patient will be transferred to postpartum in stable condition and infant to  nursery.    Zeynep Hodges M.D.    Delivery attended by Richa Melchor DNP, [facility] who was present for the entire delivery.    I was present for the birth with Dr. Hodges and Laura and agree with above documentation.

## 2018-10-26 NOTE — PROGRESS NOTES
1900_ Assumed pt care. Report from DEAN Jacobs RN. POC reviewed and understanding verbalized.  1920_ SVE=complete/+1  1925_ Dr Sanchez notified  1934_ Richa BENITO CNM @ bedside.  1955_ Dr Sanchez and Richa BENITO CNM @ bedside.  1958_ Pt started pushing  2019_ SVE viable female apgars 8/9  2225_ Pt up to the bathroom and remy care done. Pt transferred to  with baby in stable condition. Report to JAMES Adhikari.

## 2018-10-26 NOTE — PROGRESS NOTES
[facility] POSTPARTUM PROGRESS NOTE    PATIENT ID:  NAME:  Leila Navarro  MRN:               [id number]  YOB: 1995     23 y.o. female G1 now P1 at 37w1d PPD#1 s/p  induced at 37 weeks for gestational HTN.    Subjective: Feeling well, no heaving bleeding, + cramping abdominal pain, working on latching.    Objective:    Vitals:    10/25/18 2137 10/25/18 2300 10/26/18 0400 10/26/18 0530   BP: 138/87 142/93 148/111 121/88   Pulse: 96 87 92 69   Resp:     Temp:  36.2 °C (97.2 °F) 37.4 °C (99.3 °F)    TempSrc:       SpO2:  98% 98%    Weight:       Height:         General: No acute distress, resting comfortably in bed.  HEENT: normocephalic, nontraumatic, EOMI  Cardiovascular: Heart RRR with no murmurs, rubs or gallops. Distal Pulses 2+  Respiratory: symmetric chest expansion, lungs CTA bilaterally with no wheezes rales or rhonci  Abdomen: soft, mildly tender, fundus firm, +BS  Genitourinary: lochia light, denies excessive vaginal bleeding  Neuro: non focal with no numbness, tingling or changes in sensation    Recent Labs      10/24/18   2055   WBC  8.1   RBC  3.92*   HEMOGLOBIN  10.1*   HEMATOCRIT  30.2*   MCV  77.0*   MCH  25.8*   RDW  37.5   PLATELETCT  316   MPV  11.1   NEUTSPOLYS  70.50   LYMPHOCYTES  19.50*   MONOCYTES  7.90   EOSINOPHILS  1.00   BASOPHILS  0.40     Recent Labs      10/24/18   2055   SODIUM  136   POTASSIUM  3.8   CHLORIDE  106   CO2  19*   GLUCOSE  89   BUN  8       Current Meds:   Current Facility-Administered Medications   Medication Dose Frequency Provider Last Rate Last Dose   • ondansetron (ZOFRAN ODT) dispertab 4 mg  4 mg Q6HRS PRN Gunner Newton M.D.        Or   • ondansetron (ZOFRAN) syringe/vial injection 4 mg  4 mg Q6HRS PRN Gunner Newton M.D.       • acetaminophen (TYLENOL) tablet 325 mg  325 mg Q4HRS PRN Gunner Newton M.D.       • HYDROcodone-acetaminophen (NORCO) 5-325 MG per tablet 1 Tab  1 Tab Q4HRS PRN Gunner Newton M.D.       • docusate sodium (COLACE)  capsule 100 mg  100 mg BID PRN Miguel Sanchez, D.O.       • prenatal plus vitamin (STUARTNATAL 1+1) 27-1 MG tablet 1 Tab  1 Tab QAM Miguel Sanchez, D.O.   1 Tab at 10/26/18 0654   • oxytocin (PITOCIN) 20 UNITS/1000ML LR (induction of labor)  0.5-20 ezra-units/min Continuous Gunner Newton M.D. 18 mL/hr at 10/25/18 1900 6 ezra-units/min at 10/25/18 1900   • lactated ringers infusion   Continuous Glo Rowan M.D. 125 mL/hr at 10/25/18 1900     • ondansetron (ZOFRAN) syringe/vial injection 4 mg  4 mg Q4HRS PRN Gunner Newton M.D.   4 mg at 10/25/18 1001   • acetaminophen (TYLENOL) tablet 650 mg  650 mg Q6HRS PRN Miguel Sanchez D.O.   650 mg at 10/26/18 0405     Last reviewed on 10/26/2018  1:18 AM by Zeynep Gonzalez R.N.     Assessment:   23 y.o. female G1 now P1 at 37w1d PPD#1 s/p  induced at 37 weeks for gestational HTN.  BP after delivery 148/11 and this am of 121/88.    Plan:   1. Continue q 4 VS, will CTM for HTN.    2. Continue routine post partum care  3. Avoid NSAIDs   4. Tylenol/Percocet PRN pain  5. Disposition: inpatient for routine post partum care, anticipate 48 hour stay

## 2018-10-26 NOTE — LACTATION NOTE
Initial visit with mother.Mother reports she did have breast growth during pregnancy, and history of hypertension and so was induced at 37 weeks.  She reports baby  once after delivery and it was not painful.  She is concerned that baby did not get enough and she supplemented with 10 ml of formula last night and this morning. Educated on normal feeding frequencies in first days and weeks,  stomach size and how the breasts make milk. Baby placed skin to skin with mother in safe position.  Encouraged to watch for hunger cues and call for help with latching as needed. New Beginnings bookelt reviewed with mother and post d/c bfdg resources. Will follow to help with latch and offer ongoing support to build confidence.

## 2018-10-26 NOTE — CARE PLAN
Problem: Infection  Goal: Will remain free from infection  Outcome: PROGRESSING AS EXPECTED  Pt afebrile and no s/s of infection. Will continue to monitor.    Problem: Fluid Volume:  Goal: Will maintain balanced intake and output  Outcome: PROGRESSING AS EXPECTED  Pt on IV fluids and adequate urine output.

## 2018-10-26 NOTE — PROGRESS NOTES
Pt feeling pressure so reexamined; 5-6/80/-1, has come down in station and more effaced. Adequate uterine contractions, 200MVU. Continue current management.

## 2018-10-27 VITALS
HEART RATE: 81 BPM | RESPIRATION RATE: 18 BRPM | TEMPERATURE: 98 F | SYSTOLIC BLOOD PRESSURE: 124 MMHG | OXYGEN SATURATION: 99 % | DIASTOLIC BLOOD PRESSURE: 89 MMHG | HEIGHT: 67 IN | WEIGHT: 172 LBS | BODY MASS INDEX: 27 KG/M2

## 2018-10-27 PROCEDURE — 700102 HCHG RX REV CODE 250 W/ 637 OVERRIDE(OP): Performed by: STUDENT IN AN ORGANIZED HEALTH CARE EDUCATION/TRAINING PROGRAM

## 2018-10-27 PROCEDURE — A9270 NON-COVERED ITEM OR SERVICE: HCPCS | Performed by: STUDENT IN AN ORGANIZED HEALTH CARE EDUCATION/TRAINING PROGRAM

## 2018-10-27 RX ADMIN — Medication 1 TABLET: at 05:58

## 2018-10-27 ASSESSMENT — PAIN SCALES - GENERAL
PAINLEVEL_OUTOF10: 0
PAINLEVEL_OUTOF10: 3
PAINLEVEL_OUTOF10: 0

## 2018-10-27 NOTE — DISCHARGE SUMMARY
Discharge Summary:      Leila Navarro      Admit Date:   10/24/2018  Discharge Date:  10/27/2018     Admitting diagnosis:  Pregnancy  Pregnancy  Discharge Diagnosis: Status post vaginal, spontaneous.  Pregnancy Complications: gestational hypertension  Tubal Ligation:  no        History:  History reviewed. No pertinent past medical history.  OB History    Para Term  AB Living   1 1 1     1   SAB TAB Ectopic Molar Multiple Live Births           0 1      # Outcome Date GA Lbr Emanuel/2nd Weight Sex Delivery Anes PTL Lv   1 Term 10/25/18 37w1d 01:45 / 00:59 2.57 kg (5 lb 10.7 oz) F Vag-Spont EPI N LUISA           Patient has no known allergies.  There are no active problems to display for this patient.       Hospital Course:   23 y.o. , now para 1, was admitted with the above mentioned diagnosis, underwent Induction of Labor, vaginal, spontaneous. Patient postpartum course was unremarkable, with progressive advancement in diet , ambulation and toleration of oral analgesia. Patient without complaints today and desires discharge.      Vitals:    10/27/18 0000 10/27/18 0410 10/27/18 0415 10/27/18 0500   BP: 140/98 141/103 143/104 130/96   Pulse: 98 93     Resp: 17 17     Temp: 36.7 °C (98 °F) 36.6 °C (97.9 °F)     TempSrc:       SpO2: 97% 97%     Weight:       Height:           Current Facility-Administered Medications   Medication Dose   • ondansetron (ZOFRAN ODT) dispertab 4 mg  4 mg    Or   • ondansetron (ZOFRAN) syringe/vial injection 4 mg  4 mg   • acetaminophen (TYLENOL) tablet 325 mg  325 mg   • HYDROcodone-acetaminophen (NORCO) 5-325 MG per tablet 1 Tab  1 Tab   • docusate sodium (COLACE) capsule 100 mg  100 mg   • prenatal plus vitamin (STUARTNATAL 1+1) 27-1 MG tablet 1 Tab  1 Tab   • oxytocin (PITOCIN) 20 UNITS/1000ML LR (induction of labor)  0.5-20 ezra-units/min   • lactated ringers infusion     • ondansetron (ZOFRAN) syringe/vial injection 4 mg  4 mg   • acetaminophen (TYLENOL) tablet 650  mg  650 mg       Exam:  Breast Exam: negative  Abdomen: Abdomen soft, non-tender. BS normal. No masses,  No organomegaly  Fundus Non Tender: yes  Incision: none  Perineum: perineum intact  Extremity: extremities, peripheral pulses and reflexes normal     Labs:  Recent Labs      10/24/18   2055  10/26/18   0653   WBC  8.1  15.1*   RBC  3.92*  3.81*   HEMOGLOBIN  10.1*  9.5*   HEMATOCRIT  30.2*  29.4*   MCV  77.0*  77.2*   MCH  25.8*  24.9*   MCHC  33.4*  32.3*   RDW  37.5  38.4   PLATELETCT  316  280   MPV  11.1  10.2        Activity:   Discharge to home  Pelvic Rest x 6 weeks    Assessment:  Gestational HTN with continued blood pressures of 140s/100s  normal postpartum course  Discharge Assessment: Taking adequate diet and fluids, No heavy bleeding or foul vaginal discharge , Voiding without difficulty     Follow up:   [facility], Olivia Chakraborty in 3-4 days (Monday or Wednesday of next week)  Keep TID BP log at home and bring to follow up appointment  To resume daily PNV and iron supplement if needed with hydration.   Patient to return to ER if any of the following occur:  Fever over 100.5  Severe abdominal pain  Red streaks or painful masses in the breasts  Foul smelling discharge or lochia  Heavy vaginal bleeding saturating a pad per hour  S/s of PP depression  Elevated BP  Vision changes, new onset headaches  Tylenol PRN pain, avoid NSAIDs     Discharge Meds:   No current outpatient prescriptions on file.       Maria R Gandara D.O.

## 2018-10-27 NOTE — PROGRESS NOTES
0700 Received report from JANNA AYALA. Pt resting in bed. Pt denies pain at this time.     0745 This RN and additional PP RN at bedside attempting to help latch baby onto breast.    1100 Rounded on pt and infant. All needs met at this time.     1500 Report given to LIGIA Sosa RN.

## 2018-10-27 NOTE — CARE PLAN
Problem: Pain Management  Goal: Pain level will decrease to patient's comfort goal    Intervention: Follow pain managment plan developed in collaboration with patient and Interdisciplinary Team  Pain managed well with PRN medication at this time. Patient is able to ambulate, care for self and infant. Patient is able to make needs known.        Problem: Altered physiologic condition related to immediate post-delivery state and potential for bleeding/hemorrhage  Goal: Patient physiologically stable as evidenced by normal lochia, palpable uterine involution and vital signs within normal limits    Intervention: Perform physical assessment and obtain vital signs on patient as directed in Intrapartum/Postpartum Standard of Care in Policy and Procedure manual  Assessment complete, VSS, fundus firm, lochia light.

## 2018-10-27 NOTE — CARE PLAN
Problem: Pain Management  Goal: Pain level will decrease to patient's comfort goal  Outcome: PROGRESSING AS EXPECTED  Pt denies pain at this time.    Problem: Potential for postpartum infection related to presence of episiotomy/vaginal tear and/or uterine contamination  Goal: Patient will be absent from signs and symptoms of infection  Outcome: PROGRESSING AS EXPECTED  Afebrile. Lochia free from odor.

## 2018-10-27 NOTE — DISCHARGE INSTRUCTIONS
"POSTPARTUM DISCHARGE INSTRUCTIONS FOR MOM    YOB: 1995   Age: 23 y.o.               Admit Date: 10/24/2018     Discharge Date: 10/27/2018  Attending Doctor:  June Cash                  Allergies:  Patient has no known allergies.    Discharged to home by car. Discharged via wheelchair, hospital escort: Yes.  Special equipment needed: Not Applicable  Belongings with: Personal  Be sure to schedule a follow-up appointment with your primary care doctor or any specialists as instructed.     Discharge Plan:   Influenza Vaccine Indication: Patient Refuses    REASONS TO CALL YOUR OBSTETRICIAN:  1.   Persistent fever or shaking chills (Temperature higher than 100.4)  2.   Heavy bleeding (soaking more than 1 pad per hour); Passing clots  3.   Foul odor from vagina  4.   Mastitis (Breast infection; breast pain, chills, fever, redness)  5.   Urinary pain, burning or frequency  6.   Episiotomy infection    8.   Severe depression longer than 24 hours    HAND WASHING  · Prior to handling the baby.  · Before breastfeeding or bottle feeding baby.  · After using the bathroom or changing the baby's diaper.    VAGINAL CARE  · Nothing inside vagina for 6 weeks: no sexual intercourse, tampons or douching.  · Bleeding may continue for 2-4 weeks.  Amount may vary.    · Call your physician for heavy bleeding which means soaking more than 1 pad per hour    BIRTH CONTROL  · It is possible to become pregnant at any time after delivery and while breastfeeding.  · Plan to discuss a method of birth control with your physician at your follow up visit. visit.    DIET AND ELIMINATION  · Eating more fiber (bran cereal, fruits, and vegetables) and drinking plenty of fluids will help to avoid constipation.  · Urinary frequency after childbirth is normal.    POSTPARTUM BLUES  During the first few days after birth, you may experience a sense of the \"blues\" which may include impatience, irritability or even crying.  These feeling come and go " "quickly.  However, as many as 1 in 10 women experience emotional symptoms known as postpartum depression.    Postpartum depression:  May start as early as the second or third day after delivery or take several weeks or months to develop.  Symptoms of \"blues\" are present, but are more intense:  Crying spells; loss of appetite; feelings of hopelessness or loss of control; fear of touching the baby; over concern or no concern at all about the baby; little or no concern about your own appearance/caring for yourself; and/or inability to sleep or excessive sleeping.  Contact your physician if you are experiencing any of these symptoms.    Crisis Hotline:  · [place] Crisis Hotline:  1-369-UGJUUWG  Or 1-104.797.3976  · [place] Crisis Hotline:  1-412.345.2345  Or 596-342-4263    PREVENTING SHAKEN BABY:  If you are angry or stressed, PUT THE BABY IN THE CRIB, step away, take some deep breaths, and wait until you are calm to care for the baby.  DO NOT SHAKE THE BABY.  You are not alone, call a supporter for help.    · Crisis Call Center 24/7 crisis line 618-844-2136 or 1-859.268.9624  · You can also text them, text \"ANSWER\" to 090415    QUIT SMOKING/TOBACCO USE:  I understand the use of any tobacco products increases my chance of suffering from future heart disease and could cause other illnesses which may shorten my life. Quitting the use of tobacco products is the single most important thing I can do to improve my health. For further information on smoking / tobacco cessation call a Toll Free Quit Line at 1-848.828.1784 (*National [employer] [place]) or 1-640.624.2438 (American Lung Association) or you can access the web based program at www.lungusa.org.    · [place] Tobacco Users Help Line:  (562) 974-6101       Toll Free: 1-628.678.5756  · Quit Tobacco Program [facility] [place] operated by [facility] (879)477-5389    DEPRESSION / SUICIDE RISK:  As you are discharged from this [facility], it is important to learn how to " keep safe from harming yourself.    Recognize the warning signs:  · Abrupt changes in personality, positive or negative- including increase in energy   · Giving away possessions  · Change in eating patterns- significant weight changes-  positive or negative  · Change in sleeping patterns- unable to sleep or sleeping all the time   · Unwillingness or inability to communicate  · Depression  · Unusual sadness, discouragement and loneliness  · Talk of wanting to die  · Neglect of personal appearance   · Rebelliousness- reckless behavior  · Withdrawal from people/activities they love  · Confusion- inability to concentrate     If you or a loved one observes any of these behaviors or has concerns about self-harm, here's what you can do:  · Talk about it- your feelings and reasons for harming yourself  · Remove any means that you might use to hurt yourself (examples: pills, rope, extension cords, firearm)  · Get professional help from the community (Mental Health, Substance Abuse, psychological counseling)  · Do not be alone:Call your Safe Contact- someone whom you trust who will be there for you.  · Call your local CRISIS HOTLINE 870-5256 or 435-555-5796  · Call your local Children's Mobile Crisis Response Team [place] (238) 920-7210 or www.Copytele  · Call the toll free National Suicide Prevention Hotlines   · National Suicide Prevention Lifeline 347-261-IKNE (8946)  · National Hope Line Network 800-SUICIDE (040-6585)    DISCHARGE SURVEY:  Thank you for choosing [facility].  We hope we provided you with very good care.  You may be receiving a survey in the mail.  Please fill it out.  Your opinion is valuable to us.    ADDITIONAL EDUCATIONAL MATERIALS GIVEN TO PATIENT:    Olivia MARLOW in 3-4 days (Monday or Wednesday of next week)  Keep three times a day blood pressure log at home and bring to follow up appointment  To resume daily PNV and iron supplement if needed with hydration.   Patient to  return to ER if any of the following occur:  Fever over 100.5  Severe abdominal pain  Red streaks or painful masses in the breasts  Foul smelling discharge or lochia  Heavy vaginal bleeding saturating a pad per hour  S/s of PP depression  Elevated BP  Vision changes, new onset headaches  Tylenol PRN pain, avoid NSAIDs        My signature on this form indicates that:  1.  I have reviewed and understand the above information  2.  My questions regarding this information have been answered to my satisfaction.  3.  I have formulated a plan with my discharge nurse to obtain my prescribed medication for home.

## 2018-10-27 NOTE — LACTATION NOTE
This note was copied from a baby's chart.  Assisted with BF attempt, baby frantic at the breast, appears to be very flow-oriented, baby given 10 ml of formula to calm to attempt to achieve latch, after a few attempts latch obtained but baby fell asleep and was unable to be roused to BF, POC discussed, encouraged to attempt to BF Q 2-3 hours (more often if feeding cues noted) and if baby frantic to offer a small amount of formula to attempt to calm to get to latch, supplement guidelines provided and explained, mother states she has WIC (unable to remember which office), discussed assistance available at [place] after discharge and encouraged to call peer counselor for BF assistance as needed, invited to BF [place] at [facility] after discharge, encouraged to call for assistance as needed.

## 2019-02-28 ENCOUNTER — HOSPITAL ENCOUNTER (EMERGENCY)
Facility: MEDICAL CENTER | Age: 24
End: 2019-03-01
Attending: EMERGENCY MEDICINE
Payer: MEDICAID

## 2019-02-28 ENCOUNTER — APPOINTMENT (OUTPATIENT)
Dept: RADIOLOGY | Facility: MEDICAL CENTER | Age: 24
End: 2019-02-28
Payer: MEDICAID

## 2019-02-28 DIAGNOSIS — R07.9 CHEST PAIN, UNSPECIFIED TYPE: ICD-10-CM

## 2019-02-28 DIAGNOSIS — R42 LIGHTHEADEDNESS: ICD-10-CM

## 2019-02-28 LAB
APTT PPP: 29 SEC (ref 24.7–36)
BASOPHILS # BLD AUTO: 1 % (ref 0–1.8)
BASOPHILS # BLD: 0.04 K/UL (ref 0–0.12)
BNP SERPL-MCNC: 4 PG/ML (ref 0–100)
D DIMER PPP IA.FEU-MCNC: <0.4 UG/ML (FEU) (ref 0–0.5)
EOSINOPHIL # BLD AUTO: 0.22 K/UL (ref 0–0.51)
EOSINOPHIL NFR BLD: 5.7 % (ref 0–6.9)
ERYTHROCYTE [DISTWIDTH] IN BLOOD BY AUTOMATED COUNT: 39.7 FL (ref 35.9–50)
HCG SERPL QL: NEGATIVE
HCT VFR BLD AUTO: 37.9 % (ref 37–47)
HGB BLD-MCNC: 12.1 G/DL (ref 12–16)
IMM GRANULOCYTES # BLD AUTO: 0.01 K/UL (ref 0–0.11)
IMM GRANULOCYTES NFR BLD AUTO: 0.3 % (ref 0–0.9)
INR PPP: 0.98 (ref 0.87–1.13)
LYMPHOCYTES # BLD AUTO: 1.28 K/UL (ref 1–4.8)
LYMPHOCYTES NFR BLD: 33 % (ref 22–41)
MCH RBC QN AUTO: 24.5 PG (ref 27–33)
MCHC RBC AUTO-ENTMCNC: 31.9 G/DL (ref 33.6–35)
MCV RBC AUTO: 76.9 FL (ref 81.4–97.8)
MONOCYTES # BLD AUTO: 0.4 K/UL (ref 0–0.85)
MONOCYTES NFR BLD AUTO: 10.3 % (ref 0–13.4)
NEUTROPHILS # BLD AUTO: 1.93 K/UL (ref 2–7.15)
NEUTROPHILS NFR BLD: 49.7 % (ref 44–72)
NRBC # BLD AUTO: 0 K/UL
NRBC BLD-RTO: 0 /100 WBC
PLATELET # BLD AUTO: 362 K/UL (ref 164–446)
PMV BLD AUTO: 9.8 FL (ref 9–12.9)
PROTHROMBIN TIME: 13.1 SEC (ref 12–14.6)
RBC # BLD AUTO: 4.93 M/UL (ref 4.2–5.4)
TROPONIN I SERPL-MCNC: <0.01 NG/ML (ref 0–0.04)
WBC # BLD AUTO: 3.9 K/UL (ref 4.8–10.8)

## 2019-02-28 PROCEDURE — 85730 THROMBOPLASTIN TIME PARTIAL: CPT

## 2019-02-28 PROCEDURE — 99285 EMERGENCY DEPT VISIT HI MDM: CPT

## 2019-02-28 PROCEDURE — 83880 ASSAY OF NATRIURETIC PEPTIDE: CPT

## 2019-02-28 PROCEDURE — 700105 HCHG RX REV CODE 258: Performed by: EMERGENCY MEDICINE

## 2019-02-28 PROCEDURE — 71045 X-RAY EXAM CHEST 1 VIEW: CPT

## 2019-02-28 PROCEDURE — 84703 CHORIONIC GONADOTROPIN ASSAY: CPT

## 2019-02-28 PROCEDURE — 93005 ELECTROCARDIOGRAM TRACING: CPT | Performed by: EMERGENCY MEDICINE

## 2019-02-28 PROCEDURE — 93005 ELECTROCARDIOGRAM TRACING: CPT

## 2019-02-28 PROCEDURE — 85610 PROTHROMBIN TIME: CPT

## 2019-02-28 PROCEDURE — 36415 COLL VENOUS BLD VENIPUNCTURE: CPT

## 2019-02-28 PROCEDURE — 85379 FIBRIN DEGRADATION QUANT: CPT

## 2019-02-28 PROCEDURE — 85025 COMPLETE CBC W/AUTO DIFF WBC: CPT

## 2019-02-28 PROCEDURE — 84484 ASSAY OF TROPONIN QUANT: CPT

## 2019-02-28 PROCEDURE — 83690 ASSAY OF LIPASE: CPT

## 2019-02-28 PROCEDURE — 80053 COMPREHEN METABOLIC PANEL: CPT

## 2019-02-28 RX ORDER — SODIUM CHLORIDE 9 MG/ML
1000 INJECTION, SOLUTION INTRAVENOUS ONCE
Status: COMPLETED | OUTPATIENT
Start: 2019-02-28 | End: 2019-03-01

## 2019-02-28 RX ADMIN — SODIUM CHLORIDE 1000 ML: 9 INJECTION, SOLUTION INTRAVENOUS at 23:00

## 2019-02-28 ASSESSMENT — PAIN DESCRIPTION - DESCRIPTORS: DESCRIPTORS: SHARP

## 2019-03-01 VITALS
BODY MASS INDEX: 27.06 KG/M2 | RESPIRATION RATE: 16 BRPM | SYSTOLIC BLOOD PRESSURE: 134 MMHG | TEMPERATURE: 98 F | WEIGHT: 172.4 LBS | OXYGEN SATURATION: 97 % | DIASTOLIC BLOOD PRESSURE: 98 MMHG | HEIGHT: 67 IN | HEART RATE: 97 BPM

## 2019-03-01 LAB
ALBUMIN SERPL BCP-MCNC: 4.2 G/DL (ref 3.2–4.9)
ALBUMIN/GLOB SERPL: 1.3 G/DL
ALP SERPL-CCNC: 92 U/L (ref 30–99)
ALT SERPL-CCNC: 11 U/L (ref 2–50)
ANION GAP SERPL CALC-SCNC: 12 MMOL/L (ref 0–11.9)
AST SERPL-CCNC: 16 U/L (ref 12–45)
BILIRUB SERPL-MCNC: 0.2 MG/DL (ref 0.1–1.5)
BUN SERPL-MCNC: 13 MG/DL (ref 8–22)
CALCIUM SERPL-MCNC: 9.6 MG/DL (ref 8.5–10.5)
CHLORIDE SERPL-SCNC: 106 MMOL/L (ref 96–112)
CO2 SERPL-SCNC: 21 MMOL/L (ref 20–33)
CREAT SERPL-MCNC: 0.95 MG/DL (ref 0.5–1.4)
EKG IMPRESSION: NORMAL
GLOBULIN SER CALC-MCNC: 3.3 G/DL (ref 1.9–3.5)
GLUCOSE SERPL-MCNC: 99 MG/DL (ref 65–99)
LIPASE SERPL-CCNC: 35 U/L (ref 11–82)
POTASSIUM SERPL-SCNC: 3.9 MMOL/L (ref 3.6–5.5)
PROT SERPL-MCNC: 7.5 G/DL (ref 6–8.2)
SODIUM SERPL-SCNC: 139 MMOL/L (ref 135–145)

## 2019-03-01 NOTE — ED TRIAGE NOTES
"Chief Complaint   Patient presents with   • Chest Pain   • Dizziness     /89   Pulse (!) 114   Temp 37.2 °C (99 °F) (Temporal)   Resp 16   Ht 1.702 m (5' 7\")   Wt 78.2 kg (172 lb 6.4 oz)   SpO2 99%   BMI 27.00 kg/m²     Pt has been having on and off sudden sharp chest pain, symptoms started two days ago. States that the pain usually lasts about 5 min. and goes away. Now she is starting to have dizziness associated with the pain.     \"When the pain starts it causes me to double over, and I feel like if I move something will pop.\"     Pain does not increase when breathing.     +nausea  -vomiting/diarrhea.       EKG done.     Pt back out to aroldo, asked to notify saff of any changes.   "

## 2019-03-01 NOTE — DISCHARGE INSTRUCTIONS
You were seen in the Emergency Department for chest pain and lightheadedness.    EKG, labs, chest x-ray were completed without significant acute abnormalities.    Please use 1,000mg of tylenol or 600mg of ibuprofen every 6 hours as needed for pain.  Please drink plenty of fluids.    Please follow up with your primary care physician.    Return to the Emergency Department with fevers greater than 100.4, worsening chest pain, trouble breathing, or other concerns..

## 2019-03-01 NOTE — ED PROVIDER NOTES
ED Provider Note    Scribed for Jennifer Farrar M.D. by Sung Guerrero. 2/28/2019  10:17 PM    Means of arrival: Walk in  History obtained from: Patient  History limited by: None      CHIEF COMPLAINT  Chief Complaint   Patient presents with   • Chest Pain   • Dizziness       HPI  Leila Navarro is a 24 y.o. female who presents to the Emergency Department for chest pain for the past few weeks. Patient also reports lightheadedness that began 2 days ago. Her intermittent chest pain has been so severe that sometimes it causes her to have difficulty breathing. She does not currently have chest pain or shortness of breath, although she does feel nauseous. She denies any vomiting, fever, cough, or leg swelling. A few days ago patient had abdominal pain which has since resolved. She has a 4 month old daughter, and reports developing hypertension during the pregnancy which has not subsided. She is followed by [facility] PCP but does not take medication. She has been eating and drinking normally, and has not history of blood clots or heart problems.    REVIEW OF SYSTEMS  Pertinent positive include chest pain (not currently), lightheadedness, difficulty breathing, nausea, abdominal pain (now resolved). Pertinent negative include shortness of breath, vomiting, fever, cough, leg swelling. All other systems reviewed and are negative.      PAST MEDICAL HISTORY   Hypertension    SOCIAL HISTORY  Social History     Social History Main Topics   • Smoking status: Never Smoker   • Smokeless tobacco: Never Used   • Alcohol use No   • Drug use: No       SURGICAL HISTORY  patient denies any surgical history    CURRENT MEDICATIONS  Home Medications     Reviewed by Brandon Kumar R.N. (Registered Nurse) on 02/28/19 at 2130  Med List Status: Not Addressed   Medication Last Dose Status        Patient Martinez Taking any Medications                       ALLERGIES  No Known Allergies    PHYSICAL EXAM   VITAL SIGNS: /89   Pulse (!) 114    "Temp 37.2 °C (99 °F) (Temporal)   Resp 16   Ht 1.702 m (5' 7\")   Wt 78.2 kg (172 lb 6.4 oz)   SpO2 99%   BMI 27.00 kg/m²    Constitutional: Pleasant, well appearing  female. Alert in no apparent distress.  HENT: Normocephalic, Atraumatic. Bilateral external ears normal. Nose normal.  Moist mucous membranes.  Oropharynx clear.  Eyes: Pupils are equal and reactive. Conjunctiva normal.   Neck: Supple, full range of motion  Heart: Tachycardic rate and regular rhythm. No murmurs.    Lungs: No respiratory distress, normal work of breathing. Lungs clear to auscultation bilaterally.  Abdomen Soft, no distention.  No tenderness to palpation.  Musculoskeletal: Atraumatic. No obvious deformities noted.  No lower extremity edema.  Skin: Warm, Dry.  No erythema, No rash.   Neurologic: Alert and oriented x3. Moving all extremities spontaneously without focal deficits.  Psychiatric: Affect normal, Mood normal, Appears appropriate and not intoxicated.      DIAGNOSTIC STUDIES    EKG  Results for orders placed or performed during the hospital encounter of 19   EKG   Result Value Ref Range    Report       [facility] Emergency Dept.    Test Date:  2019  Pt Name:    AVANI MENDES                Department: ER  MRN:        [id number]                      Room:  Gender:     Female                       Technician: [id number]  :        1995                   Requested By:ER TRIAGE PROTOCOL  Order #:    [id number]                    Reading MD: Jennifer Farrar MD    Measurements  Intervals                                Axis  Rate:       106                          P:          51  MS:         148                          QRS:        43  QRSD:       72                           T:          25  QT:         348  QTc:        463    Interpretive Statements  SINUS TACHYCARDIA  Normal axis and intervals  No ectopy or hypertrophy  No ST or T wave change  No previous ECG available for " "comparison    Electronically Signed On 3-1-2019 0:09:25 PST by Jennifer Farrar MD             LABS  Personally reviewed by me  Labs Reviewed   CBC WITH DIFFERENTIAL - Abnormal; Notable for the following:        Result Value    WBC 3.9 (*)     MCV 76.9 (*)     MCH 24.5 (*)     MCHC 31.9 (*)     Neutrophils (Absolute) 1.93 (*)     All other components within normal limits    Narrative:     Indicate which anticoagulants the patient is on:->UNKNOWN   COMP METABOLIC PANEL - Abnormal; Notable for the following:     Anion Gap 12.0 (*)     All other components within normal limits    Narrative:     Indicate which anticoagulants the patient is on:->UNKNOWN   TROPONIN    Narrative:     Indicate which anticoagulants the patient is on:->UNKNOWN   BTYPE NATRIURETIC PEPTIDE    Narrative:     Indicate which anticoagulants the patient is on:->UNKNOWN   PROTHROMBIN TIME    Narrative:     Indicate which anticoagulants the patient is on:->UNKNOWN   APTT    Narrative:     Indicate which anticoagulants the patient is on:->UNKNOWN   LIPASE    Narrative:     Indicate which anticoagulants the patient is on:->UNKNOWN   D-DIMER   HCG QUAL SERUM   ESTIMATED GFR    Narrative:     Indicate which anticoagulants the patient is on:->UNKNOWN           RADIOLOGY  Personally reviewed by me  DX-CHEST-LIMITED (1 VIEW)   Final Result      No radiographic evidence of acute cardiopulmonary process.            ED COURSE  Vitals:    02/28/19 2118 02/28/19 2125 03/01/19 0013   BP: 134/89  134/98   Pulse: (!) 114  97   Resp: 16  16   Temp: 37.2 °C (99 °F)  36.7 °C (98 °F)   TempSrc: Temporal  Temporal   SpO2: 99%  97%   Weight:  78.2 kg (172 lb 6.4 oz)    Height: 1.702 m (5' 7\")           Medications administered:  Medications   NS infusion 1,000 mL (0 mL Intravenous Stopped 3/1/19 0000)     Patient was given IV fluids for tachycardia and possible dehydration.  IV hydration was used because oral hydration was not adequate alone.  Following fluid administration " patient's vital signs and hydration status were improved.    10:17 PM Patient seen and examined at bedside. The patient presents with chest pain. Ordered for chest xray, HCG qual serum, d-dimer, troponin, BNP, CBC, CMP, PTINR, APTT, lipase, EKG to evaluate. Patient will be treated with NS infusion for her tachycardia.      MEDICAL DECISION MAKING  Otherwise healthy female presents with 1 day history of chest pain and lightheadedness.  She is afebrile, tachycardic on arrival with otherwise normal vital signs.  EKG does not demonstrate evidence of ischemia or arrhythmia.  Troponin is negative, doubt ACS.  Patient's d-dimer is negative as well making risk for pulmonary embolism very low.  Chest x-ray is negative for pneumonia, pneumothorax, pulmonary edema.  Labs are overall reassuring without electrolyte abnormality or anemia.    Upon reassessment, patient is resting comfortably with normal vital signs.  No new complaints at this time.  She endorses significant of her improvement of her symptoms following fluids.  Discussed results with patient and/or family as well as importance of primary care follow up.  Patient understands plan of care and strict return precautions for new or changing symptoms.       FINAL IMPRESSION  1. Chest pain, unspecified type    2. Lightheadedness        PRESCRIPTIONS  There are no discharge medications for this patient.      FOLLOW UP  Pao Gomez D.O.  [facility]  [address]  [place] [address] [phone]    Call   to establish PCP    [facility], Emergency Dept  [address] [address] [phone]    If symptoms worsen        -DISCHARGE-    Results, diagnoses, and treatment options were discussed with the patient and/or family. Patient verbalized understanding of plan of care.    There are no discharge medications for this patient.           Sung STUBBS (Scribe), am scribing for, and in the presence of, Jennifer Farrar,  M.D..    Electronically signed by: Sung Guerrero (Scribe), 2/28/2019    Jennifer STUBBS M.D. personally performed the services described in this documentation, as scribed by Sung Guerrero in my presence, and it is both accurate and complete. C.    The note accurately reflects work and decisions made by me.  Jennifer Farrar  3/1/2019  2:53 AM

## 2019-03-01 NOTE — ED NOTES
Pt discharged home. Assessment complete. Pt ambulates self. VS stable. Pt verbalized discharge instructions. pt verbalizes teaching provided

## 2019-05-15 ENCOUNTER — HOSPITAL ENCOUNTER (EMERGENCY)
Facility: MEDICAL CENTER | Age: 24
End: 2019-05-15
Attending: EMERGENCY MEDICINE
Payer: MEDICAID

## 2019-05-15 ENCOUNTER — APPOINTMENT (OUTPATIENT)
Dept: RADIOLOGY | Facility: MEDICAL CENTER | Age: 24
End: 2019-05-15
Attending: EMERGENCY MEDICINE
Payer: MEDICAID

## 2019-05-15 VITALS
HEIGHT: 66 IN | SYSTOLIC BLOOD PRESSURE: 128 MMHG | BODY MASS INDEX: 28.12 KG/M2 | WEIGHT: 175 LBS | RESPIRATION RATE: 17 BRPM | HEART RATE: 85 BPM | OXYGEN SATURATION: 99 % | DIASTOLIC BLOOD PRESSURE: 78 MMHG | TEMPERATURE: 97.3 F

## 2019-05-15 DIAGNOSIS — R07.9 CHEST PAIN, UNSPECIFIED TYPE: ICD-10-CM

## 2019-05-15 DIAGNOSIS — R07.9 CHEST PAIN WITH LOW RISK FOR CARDIAC ETIOLOGY: Primary | ICD-10-CM

## 2019-05-15 LAB
ALBUMIN SERPL BCP-MCNC: 3.9 G/DL (ref 3.2–4.9)
ALBUMIN/GLOB SERPL: 1.2 G/DL
ALP SERPL-CCNC: 85 U/L (ref 30–99)
ALT SERPL-CCNC: 7 U/L (ref 2–50)
ANION GAP SERPL CALC-SCNC: 8 MMOL/L (ref 0–11.9)
AST SERPL-CCNC: 13 U/L (ref 12–45)
BASOPHILS # BLD AUTO: 0.9 % (ref 0–1.8)
BASOPHILS # BLD: 0.06 K/UL (ref 0–0.12)
BILIRUB SERPL-MCNC: 0.2 MG/DL (ref 0.1–1.5)
BUN SERPL-MCNC: 16 MG/DL (ref 8–22)
CALCIUM SERPL-MCNC: 9.3 MG/DL (ref 8.5–10.5)
CHLORIDE SERPL-SCNC: 105 MMOL/L (ref 96–112)
CO2 SERPL-SCNC: 24 MMOL/L (ref 20–33)
CREAT SERPL-MCNC: 0.96 MG/DL (ref 0.5–1.4)
EKG IMPRESSION: NORMAL
EOSINOPHIL # BLD AUTO: 0.3 K/UL (ref 0–0.51)
EOSINOPHIL NFR BLD: 4.6 % (ref 0–6.9)
ERYTHROCYTE [DISTWIDTH] IN BLOOD BY AUTOMATED COUNT: 42.7 FL (ref 35.9–50)
GLOBULIN SER CALC-MCNC: 3.2 G/DL (ref 1.9–3.5)
GLUCOSE SERPL-MCNC: 105 MG/DL (ref 65–99)
HCG UR QL: NEGATIVE
HCT VFR BLD AUTO: 38.7 % (ref 37–47)
HGB BLD-MCNC: 12 G/DL (ref 12–16)
IMM GRANULOCYTES # BLD AUTO: 0.01 K/UL (ref 0–0.11)
IMM GRANULOCYTES NFR BLD AUTO: 0.2 % (ref 0–0.9)
LYMPHOCYTES # BLD AUTO: 2.63 K/UL (ref 1–4.8)
LYMPHOCYTES NFR BLD: 40 % (ref 22–41)
MCH RBC QN AUTO: 24.2 PG (ref 27–33)
MCHC RBC AUTO-ENTMCNC: 31 G/DL (ref 33.6–35)
MCV RBC AUTO: 78 FL (ref 81.4–97.8)
MONOCYTES # BLD AUTO: 0.45 K/UL (ref 0–0.85)
MONOCYTES NFR BLD AUTO: 6.8 % (ref 0–13.4)
NEUTROPHILS # BLD AUTO: 3.12 K/UL (ref 2–7.15)
NEUTROPHILS NFR BLD: 47.5 % (ref 44–72)
NRBC # BLD AUTO: 0 K/UL
NRBC BLD-RTO: 0 /100 WBC
PLATELET # BLD AUTO: 421 K/UL (ref 164–446)
PMV BLD AUTO: 9.7 FL (ref 9–12.9)
POTASSIUM SERPL-SCNC: 3.9 MMOL/L (ref 3.6–5.5)
PROT SERPL-MCNC: 7.1 G/DL (ref 6–8.2)
RBC # BLD AUTO: 4.96 M/UL (ref 4.2–5.4)
SODIUM SERPL-SCNC: 137 MMOL/L (ref 135–145)
SP GR UR REFRACTOMETRY: 1.01
TROPONIN I SERPL-MCNC: <0.01 NG/ML (ref 0–0.04)
TROPONIN I SERPL-MCNC: <0.01 NG/ML (ref 0–0.04)
WBC # BLD AUTO: 6.6 K/UL (ref 4.8–10.8)

## 2019-05-15 PROCEDURE — 81025 URINE PREGNANCY TEST: CPT

## 2019-05-15 PROCEDURE — 80053 COMPREHEN METABOLIC PANEL: CPT

## 2019-05-15 PROCEDURE — 93005 ELECTROCARDIOGRAM TRACING: CPT

## 2019-05-15 PROCEDURE — 71046 X-RAY EXAM CHEST 2 VIEWS: CPT

## 2019-05-15 PROCEDURE — 93005 ELECTROCARDIOGRAM TRACING: CPT | Performed by: EMERGENCY MEDICINE

## 2019-05-15 PROCEDURE — 84484 ASSAY OF TROPONIN QUANT: CPT

## 2019-05-15 PROCEDURE — 99284 EMERGENCY DEPT VISIT MOD MDM: CPT

## 2019-05-15 PROCEDURE — 85025 COMPLETE CBC W/AUTO DIFF WBC: CPT

## 2019-05-15 ASSESSMENT — ENCOUNTER SYMPTOMS
SHORTNESS OF BREATH: 1
VOMITING: 0
FEVER: 0
NAUSEA: 0
BACK PAIN: 0
HEADACHES: 0
PALPITATIONS: 0
ORTHOPNEA: 0
ABDOMINAL PAIN: 0
NECK PAIN: 0
COUGH: 0
CHILLS: 0

## 2019-05-15 NOTE — ED PROVIDER NOTES
"ED Provider Note     5/15/2019  1:36 AM    Means of Arrival: Walk In  History obtained by: patient  Limitations: none    CHIEF COMPLAINT  Chief Complaint   Patient presents with   • Chest Pain     left sided        HPI  Leila Navarro is a 24 y.o. female who presents with concerns of left-sided chest pain starting 1 hour ago.  She has had similar episodes in the past.  She says the pain is sharp pulsating pain.  Nonradiating.  Mild shortness of breath.  She says she has seen a doctor before for this and was told they did not find anything abnormal.  No recent surgeries.  No leg swelling.  Not taking any hormone therapy.  She had a baby 6 months ago.  Prior to episode she was pain and symptom-free.    REVIEW OF SYSTEMS  Review of Systems   Constitutional: Negative for chills and fever.   HENT: Negative for congestion.    Respiratory: Positive for shortness of breath. Negative for cough.    Cardiovascular: Positive for chest pain. Negative for palpitations, orthopnea and leg swelling.   Gastrointestinal: Negative for abdominal pain, nausea and vomiting.   Musculoskeletal: Negative for back pain and neck pain.   Neurological: Negative for headaches.   All other systems reviewed and are negative.    See HPI for further details.    PAST MEDICAL HISTORY    6 months post partum     SOCIAL HISTORY  Social History     Social History Main Topics   • Smoking status: Never Smoker   • Smokeless tobacco: Never Used   • Alcohol use No   • Drug use: No   • Sexual activity: Not on file       SURGICAL HISTORY  patient denies any surgical history    CURRENT MEDICATIONS  Home Medications     Reviewed by Desmond White R.N. (Registered Nurse) on 05/15/19 at 0116  Med List Status: Complete   Medication Last Dose Status        Patient Martinez Taking any Medications                       ALLERGIES  No Known Allergies    PHYSICAL EXAM  VITAL SIGNS: /78   Pulse 85   Temp 36.3 °C (97.3 °F) (Temporal)   Resp 17   Ht 1.676 m (5' 6\")   " Wt 79.4 kg (175 lb)   LMP 04/05/2019   SpO2 99%   BMI 28.25 kg/m²     Pulse ox interpretation: I interpret this pulse ox as normal.  Constitutional: Alert in no apparent distress. Anxious and tearful talking about life stressors  HENT: No signs of trauma, Bilateral external ears normal, Nose normal.   Eyes: Pupils are equal, Conjunctiva normal, Non-icteric.   Neck: Normal range of motion, No tenderness, Supple, No stridor.   Cardiovascular: Regular rate and rhythm, no murmurs. Symmetric distal pulses. No cyanosis of extremities. No peripheral edema of extremities.  Thorax & Lungs: Normal breath sounds, No respiratory distress, No wheezing, No chest tenderness.   Abdomen: Bowel sounds normal, Soft, No tenderness, No masses, No pulsatile masses. No peritoneal signs.  Skin: Warm, Dry, No erythema, No rash.   Back: No midline bony tenderness, No CVA tenderness.   Musculoskeletal: Good range of motion in all major joints. No tenderness to palpation or major deformities noted.   Neurologic: Alert , Normal motor function, Normal sensory function, No focal deficits noted.   Psychiatric: anxious, tearful, denies suicidal thoughts  Physical Exam      DIAGNOSTIC STUDIES / PROCEDURES    EKG  EKG Interpretation:  Interpreted by me    Rhythm:  Normal sinus rhythm   Rate: 85  Axis: normal  Ectopy: none  Conduction: normal  ST Segments: no acute change  T Waves: no acute change  Q Waves: none  Clinical Impression: Normal EKG without acute changes       LABS  Pertinent Labs & Imaging studies reviewed. (See chart for details)    RADIOLOGY  Pertinent Labs & Imaging studies reviewed. (See chart for details)    COURSE & MEDICAL DECISION MAKING  Pertinent Labs & Imaging studies reviewed. (See chart for details)    1:36 AM This is an emergent evaluation of a  24 y.o. female who presents with concerns of episode of left-sided chest pain.  Started 1 hour ago.  No longer having pain.  Initial heart rate at triage 104 however on EKG heart  rate is 84 and sinus rhythm.. Physical exam is benign and no significant findings.  The differential diagnosis includes but is not limited to I suspect most likely anxiety related or benign etiology, however will still pursue cardiac work-up with EKG, chest x-ray, lab work to look for possible pericarditis, lung abnormality, MI.  She is PERC negative.  I am basing heart rate on EKG.      3:13 AM  Still awaiting CXR. Troponin wnl. HEART Score is 0. Second troponin pending.     4:01 AM  CXR normal. Second troponin negative. She is low risk for ACS. PERC negative. I suspect possible anxiety vs musculoskeletal pain. Provided reassurance and asked her to follow up with her primary provider.      The patient will return for worsening symptoms and is stable at the time of discharge. The patient verbalizes understanding. Guidance was provided on appropriate use of medications including driving under the influence, overdose, and side effects.         FINAL IMPRESSION    ICD-10-CM   1. Chest pain with low risk for cardiac etiology R07.9   2. Chest pain, unspecified type R07.9            This dictation was created using voice recognition software. The accuracy of the dictation is limited to the abilities of the software. I expect there may be some errors of grammar and possibly content. The nursing notes were reviewed and certain aspects of this information were incorporated into this note.    Electronically signed by: Efraín Feldman II, 5/15/2019 1:36 AM

## 2019-05-15 NOTE — ED TRIAGE NOTES
1/21/2019      Nikki Onofre  [address] [facility] [address]        Dear Nikki Onofre,    Our records indicate you are overdue for a follow-up Left Breast Ultrasound on 12/26/2018. An order is required, so please call your healthcare provider to order this examination for you, which can be done electronically.    If needed, you may contact the [facility] at [phone]  Monday thru Friday, 7:00 a.m. - 3:30 p.m. to assist you with obtaining your  physicians order or to schedule an appointment.    You may also contact our Centralized Scheduling Dept. at [phone]  Monday thru Friday, 6 a.m. - 8 p.m. to schedule an appointment once an  order has been obtained.     Please disregard this notice if you have already scheduled or completed your exam.      Sincerely,    [facility]    [id number]                 Leila Navarro  24 y.o.  female  Chief Complaint   Patient presents with   • Chest Pain     left sided      Present to triage c/o left sided chest pain, described as on and off pinching. Pain radiates to left arm. Denies back pain. + SOB and Nausea.

## 2020-08-13 ENCOUNTER — HOSPITAL ENCOUNTER (EMERGENCY)
Facility: MEDICAL CENTER | Age: 25
End: 2020-08-13
Attending: EMERGENCY MEDICINE
Payer: COMMERCIAL

## 2020-08-13 ENCOUNTER — APPOINTMENT (OUTPATIENT)
Dept: RADIOLOGY | Facility: MEDICAL CENTER | Age: 25
End: 2020-08-13
Attending: EMERGENCY MEDICINE
Payer: COMMERCIAL

## 2020-08-13 VITALS
SYSTOLIC BLOOD PRESSURE: 127 MMHG | TEMPERATURE: 98.6 F | BODY MASS INDEX: 27.41 KG/M2 | DIASTOLIC BLOOD PRESSURE: 85 MMHG | OXYGEN SATURATION: 100 % | HEART RATE: 92 BPM | HEIGHT: 67 IN | RESPIRATION RATE: 16 BRPM

## 2020-08-13 DIAGNOSIS — R10.2 PELVIC PAIN: ICD-10-CM

## 2020-08-13 LAB
APPEARANCE UR: CLEAR
BILIRUB UR QL STRIP.AUTO: NEGATIVE
CANDIDA DNA VAG QL PROBE+SIG AMP: NEGATIVE
COLOR UR: YELLOW
G VAGINALIS DNA VAG QL PROBE+SIG AMP: POSITIVE
GLUCOSE UR STRIP.AUTO-MCNC: NEGATIVE MG/DL
HCG UR QL: NEGATIVE
KETONES UR STRIP.AUTO-MCNC: NEGATIVE MG/DL
LEUKOCYTE ESTERASE UR QL STRIP.AUTO: NEGATIVE
MICRO URNS: NORMAL
NITRITE UR QL STRIP.AUTO: NEGATIVE
PH UR STRIP.AUTO: 6.5 [PH] (ref 5–8)
PROT UR QL STRIP: NEGATIVE MG/DL
RBC UR QL AUTO: NEGATIVE
SP GR UR STRIP.AUTO: 1.02
T VAGINALIS DNA VAG QL PROBE+SIG AMP: NEGATIVE
UROBILINOGEN UR STRIP.AUTO-MCNC: 0.2 MG/DL

## 2020-08-13 PROCEDURE — 81003 URINALYSIS AUTO W/O SCOPE: CPT

## 2020-08-13 PROCEDURE — 87480 CANDIDA DNA DIR PROBE: CPT

## 2020-08-13 PROCEDURE — 87591 N.GONORRHOEAE DNA AMP PROB: CPT

## 2020-08-13 PROCEDURE — 81025 URINE PREGNANCY TEST: CPT

## 2020-08-13 PROCEDURE — 700102 HCHG RX REV CODE 250 W/ 637 OVERRIDE(OP): Performed by: EMERGENCY MEDICINE

## 2020-08-13 PROCEDURE — 87660 TRICHOMONAS VAGIN DIR PROBE: CPT

## 2020-08-13 PROCEDURE — 76856 US EXAM PELVIC COMPLETE: CPT

## 2020-08-13 PROCEDURE — 58301 REMOVE INTRAUTERINE DEVICE: CPT

## 2020-08-13 PROCEDURE — A9270 NON-COVERED ITEM OR SERVICE: HCPCS | Performed by: EMERGENCY MEDICINE

## 2020-08-13 PROCEDURE — 99284 EMERGENCY DEPT VISIT MOD MDM: CPT

## 2020-08-13 PROCEDURE — 306637 HCHG MISC ORTHO ITEM RC 0274

## 2020-08-13 PROCEDURE — 87491 CHLMYD TRACH DNA AMP PROBE: CPT

## 2020-08-13 PROCEDURE — 87510 GARDNER VAG DNA DIR PROBE: CPT

## 2020-08-13 RX ORDER — METRONIDAZOLE 500 MG/1
500 TABLET ORAL ONCE
Status: COMPLETED | OUTPATIENT
Start: 2020-08-13 | End: 2020-08-13

## 2020-08-13 RX ORDER — METRONIDAZOLE 500 MG/1
500 TABLET ORAL 3 TIMES DAILY
Qty: 21 TAB | Refills: 0 | Status: SHIPPED | OUTPATIENT
Start: 2020-08-13 | End: 2020-08-20

## 2020-08-13 RX ORDER — NORGESTIMATE AND ETHINYL ESTRADIOL 0.25-0.035
1 KIT ORAL DAILY
Qty: 28 TAB | Refills: 1 | Status: ON HOLD | OUTPATIENT
Start: 2020-08-13 | End: 2021-09-26

## 2020-08-13 RX ADMIN — METRONIDAZOLE 500 MG: 500 TABLET ORAL at 20:31

## 2020-08-13 NOTE — ED NOTES
Approx 23 weeks preg (G 1 P 0).  States pelvic pain/pressure with walking started approx 20 minutes ago.  No bleeding.  States vaginal discharge but that she has had it the entire pregnancy and OB said it was normal.  +prenatal care in [place], [place].  + fetal movement per pt.   ---

## 2020-08-14 LAB
C TRACH DNA SPEC QL NAA+PROBE: NEGATIVE
N GONORRHOEA DNA SPEC QL NAA+PROBE: NEGATIVE
SPECIMEN SOURCE: NORMAL

## 2020-08-14 NOTE — ED PROVIDER NOTES
ED Provider Note    CHIEF COMPLAINT  Chief Complaint   Patient presents with   • Pelvic Pain       HPI  Leila Navarro is a 25 y.o. female who presents with a chief complaint of pelvic pain.  Says in the low pelvic area duration has been for about a week to 10 days.  Says low pelvic area cramping intermittent worse when she is walking better when she stays still radiating to the back.  No associated vomiting some intermittent nausea when the pain is really bad no dysuria but she states that sometimes she is to hesitate to urinate.  No flank pain no vomiting no vaginal bleeding and no significant vaginal discharge    She has 1 child is about 2 years old.  She states been on IUD since then.  She recently moved here has not been able to establish care with a gynecologist.  She is come here because her mother also has a history of cyst and so she is worried about a cyst.    REVIEW OF SYSTEMS  General: No fever or chills  Eyes: No discharge  Ears, nose, throat: No sore throat trouble swallowing  Pulmonary: No wheezes or difficulty breathing  Cardiovascular: No chest pain  GI: See above no diarrhea    see above   Musculoskeletal: No neck pain or joint pain  Dermatologic: No rashes    ALL OTHER SYSTEMS ARE NEGATIVE.    PAST MEDICAL HISTORY  History reviewed. No pertinent past medical history.    FAMILY HISTORY  History reviewed. No pertinent family history.    SOCIAL HISTORY  Social History     Socioeconomic History   • Marital status: Single     Spouse name: Not on file   • Number of children: Not on file   • Years of education: Not on file   • Highest education level: Not on file   Occupational History   • Not on file   Social Needs   • Financial resource strain: Not on file   • Food insecurity     Worry: Not on file     Inability: Not on file   • Transportation needs     Medical: Not on file     Non-medical: Not on file   Tobacco Use   • Smoking status: Never Smoker   • Smokeless tobacco: Never Used   Substance and  "Sexual Activity   • Alcohol use: No   • Drug use: No   • Sexual activity: Not on file   Lifestyle   • Physical activity     Days per week: Not on file     Minutes per session: Not on file   • Stress: Not on file   Relationships   • Social connections     Talks on phone: Not on file     Gets together: Not on file     Attends Anabaptism service: Not on file     Active member of club or organization: Not on file     Attends meetings of clubs or organizations: Not on file     Relationship status: Not on file   • Intimate partner violence     Fear of current or ex partner: Not on file     Emotionally abused: Not on file     Physically abused: Not on file     Forced sexual activity: Not on file   Other Topics Concern   • Not on file   Social History Narrative   • Not on file       SURGICAL HISTORY  History reviewed. No pertinent surgical history.    CURRENT MEDICATIONS  Home Medications     Reviewed by Hanna Mcbride R.N. (Registered Nurse) on 08/13/20 at 1755  Med List Status: Complete   Medication Last Dose Status        Patient Martinez Taking any Medications                       ALLERGIES  No Known Allergies    PHYSICAL EXAM  VITAL SIGNS: /88   Pulse (!) 122   Temp 36.8 °C (98.2 °F) (Temporal)   Resp 14   Ht 1.702 m (5' 7\")   LMP 07/27/2020   SpO2 100%   BMI 27.41 kg/m²   Constitutional: Well developed, Well nourished, No acute distress, Non-toxic appearance.   HENT: Normocephalic, Atraumatic, Bilateral external ears normal, Oropharynx moist, No oral exudates, Nose normal.   Eyes: PERRLA, EOMI, Conjunctiva normal, No discharge.   Musculoskeletal: Neck is soft and supple  Lymphatic: No cervical lymphadenopathy noted.   Cardiovascular: Normal heart rate, Normal rhythm, No murmurs, No rubs, No gallops.   Pulmonary: Lungs are clear to auscultation bilaterally no wheezes rales or rhonchi Abdomen: Bowel sounds normal, Soft, No tenderness, No masses, No pulsatile masses.   Genitalia: Patient has slight cervical " tenderness.  No adnexal masses.  IUD is in place  Skin: Warm, Dry, No erythema, No rash.   Back: No tenderness, No CVA tenderness.     RADIOLOGY/PROCEDURES  US-PELVIC COMPLETE (TRANSABDOMINAL/TRANSVAGINAL) (COMBO)   Final Result      Low placement of IUD in the lower uterine segment.      Otherwise unremarkable pelvic ultrasound.        IUD removal as per the patient she requested IUD removal patient had Tyrone forceps it was gently traction was placed and then full IUD came out.  No subsequent bleeding was noted.    Results for orders placed or performed during the hospital encounter of 08/13/20   URINALYSIS (UA)    Specimen: Urine   Result Value Ref Range    Color Yellow     Character Clear     Specific Gravity 1.025 <1.035    Ph 6.5 5.0 - 8.0    Glucose Negative Negative mg/dL    Ketones Negative Negative mg/dL    Protein Negative Negative mg/dL    Bilirubin Negative Negative    Urobilinogen, Urine 0.2 Negative    Nitrite Negative Negative    Leukocyte Esterase Negative Negative    Occult Blood Negative Negative    Micro Urine Req see below    HCG QUALITATIVE UR (Lab)   Result Value Ref Range    Beta-Hcg Urine Negative Negative   CHLAMYDIA & GC BY PCR    Specimen: Genital   Result Value Ref Range    Source Urine       COURSE & MEDICAL DECISION MAKING  Pertinent Labs & Imaging studies reviewed. (See chart for details)  Abdominal pain pelvic pain UTI, bacterial vaginosis, STD, cyst, IUD pain among others less likely appendicitis gallbladder disease.    Very pleasant lady [age] is old we will get an ultrasound low but rule out cyst will do a pelvic exam to take the status of the IUD as well as look for BV and STDs.  At this point patient is otherwise well nontoxic    This point had pelvic exam cervicitis is expected by history and physical urine and lab work is unremarkable.    I did put the patient on Flagyl.  At this point patient is otherwise well discharged home in stable condition will have scheduling team  follow-up with her.    She is told return is increasing pain fever chills vomiting.    FINAL IMPRESSION  1.  Pelvic pain  2.  Cervicitis  3.      Electronically signed by: Nick Rizzo M.D., 8/13/2020 6:43 PM

## 2020-08-14 NOTE — ED TRIAGE NOTES
Pt ambulatory to triage c/o pelvic pain x 2 days with nausea no vomiting. Pt denies any vaginal bleeding or discharge. nad

## 2020-08-14 NOTE — ED NOTES
"Pt discharged homne. Pt in possession of belongings. Pt provided discharge education and information pertaining to medications and follow up appointments. Pt received copy of discharge instructions and verbalized understanding. /85   Pulse 92   Temp 37 °C (98.6 °F) (Temporal)   Resp 16   Ht 1.702 m (5' 7\")   LMP 07/27/2020   SpO2 100%   BMI 27.41 kg/m²     "

## 2021-08-30 LAB — TREPONEMA PALLIDUM IGG+IGM AB [PRESENCE] IN SERUM OR PLASMA BY IMMUNOASSAY: NORMAL

## 2021-09-09 ENCOUNTER — HOSPITAL ENCOUNTER (EMERGENCY)
Facility: MEDICAL CENTER | Age: 26
End: 2021-09-10
Payer: MEDICAID

## 2021-09-09 LAB
APPEARANCE UR: ABNORMAL
BASOPHILS # BLD AUTO: 0.3 % (ref 0–1.8)
BASOPHILS # BLD: 0.03 K/UL (ref 0–0.12)
COLOR UR AUTO: YELLOW
CREAT UR-MCNC: 57.21 MG/DL
EOSINOPHIL # BLD AUTO: 0.08 K/UL (ref 0–0.51)
EOSINOPHIL NFR BLD: 0.8 % (ref 0–6.9)
ERYTHROCYTE [DISTWIDTH] IN BLOOD BY AUTOMATED COUNT: 39.4 FL (ref 35.9–50)
GLUCOSE UR QL STRIP.AUTO: NEGATIVE MG/DL
HCT VFR BLD AUTO: 34.3 % (ref 37–47)
HGB BLD-MCNC: 11.1 G/DL (ref 12–16)
IMM GRANULOCYTES # BLD AUTO: 0.1 K/UL (ref 0–0.11)
IMM GRANULOCYTES NFR BLD AUTO: 0.9 % (ref 0–0.9)
KETONES UR QL STRIP.AUTO: NEGATIVE MG/DL
LEUKOCYTE ESTERASE UR QL STRIP.AUTO: NEGATIVE
LYMPHOCYTES # BLD AUTO: 2.33 K/UL (ref 1–4.8)
LYMPHOCYTES NFR BLD: 21.9 % (ref 22–41)
MCH RBC QN AUTO: 25.1 PG (ref 27–33)
MCHC RBC AUTO-ENTMCNC: 32.4 G/DL (ref 33.6–35)
MCV RBC AUTO: 77.4 FL (ref 81.4–97.8)
MONOCYTES # BLD AUTO: 0.61 K/UL (ref 0–0.85)
MONOCYTES NFR BLD AUTO: 5.7 % (ref 0–13.4)
NEUTROPHILS # BLD AUTO: 7.48 K/UL (ref 2–7.15)
NEUTROPHILS NFR BLD: 70.4 % (ref 44–72)
NITRITE UR QL STRIP.AUTO: NEGATIVE
NRBC # BLD AUTO: 0 K/UL
NRBC BLD-RTO: 0 /100 WBC
PH UR STRIP.AUTO: 7 [PH] (ref 5–8)
PLATELET # BLD AUTO: 397 K/UL (ref 164–446)
PMV BLD AUTO: 9.7 FL (ref 9–12.9)
PROT UR QL STRIP: NEGATIVE MG/DL
PROT UR-MCNC: 7 MG/DL (ref 0–15)
PROT/CREAT UR: 122 MG/G (ref 10–107)
RBC # BLD AUTO: 4.43 M/UL (ref 4.2–5.4)
RBC UR QL AUTO: NEGATIVE
SP GR UR STRIP.AUTO: 1.01 (ref 1–1.03)
WBC # BLD AUTO: 10.6 K/UL (ref 4.8–10.8)

## 2021-09-09 PROCEDURE — 99284 EMERGENCY DEPT VISIT MOD MDM: CPT

## 2021-09-09 PROCEDURE — 85025 COMPLETE CBC W/AUTO DIFF WBC: CPT

## 2021-09-09 PROCEDURE — 84156 ASSAY OF PROTEIN URINE: CPT

## 2021-09-09 PROCEDURE — 700102 HCHG RX REV CODE 250 W/ 637 OVERRIDE(OP): Performed by: FAMILY MEDICINE

## 2021-09-09 PROCEDURE — 36415 COLL VENOUS BLD VENIPUNCTURE: CPT

## 2021-09-09 PROCEDURE — 81002 URINALYSIS NONAUTO W/O SCOPE: CPT

## 2021-09-09 PROCEDURE — 80053 COMPREHEN METABOLIC PANEL: CPT

## 2021-09-09 PROCEDURE — A9270 NON-COVERED ITEM OR SERVICE: HCPCS | Performed by: FAMILY MEDICINE

## 2021-09-09 PROCEDURE — 82570 ASSAY OF URINE CREATININE: CPT

## 2021-09-09 RX ORDER — ACETAMINOPHEN 500 MG
1000 TABLET ORAL ONCE
Status: COMPLETED | OUTPATIENT
Start: 2021-09-09 | End: 2021-09-09

## 2021-09-09 RX ADMIN — ACETAMINOPHEN 1000 MG: 500 TABLET ORAL at 22:34

## 2021-09-09 ASSESSMENT — PAIN SCALES - GENERAL: PAINLEVEL: 7

## 2021-09-10 VITALS
WEIGHT: 193 LBS | TEMPERATURE: 97.9 F | HEIGHT: 67 IN | SYSTOLIC BLOOD PRESSURE: 139 MMHG | HEART RATE: 88 BPM | DIASTOLIC BLOOD PRESSURE: 98 MMHG | RESPIRATION RATE: 15 BRPM | BODY MASS INDEX: 30.29 KG/M2

## 2021-09-10 LAB
ALBUMIN SERPL BCP-MCNC: 3.6 G/DL (ref 3.2–4.9)
ALBUMIN/GLOB SERPL: 0.9 G/DL
ALP SERPL-CCNC: 128 U/L (ref 30–99)
ALT SERPL-CCNC: 19 U/L (ref 2–50)
ANION GAP SERPL CALC-SCNC: 11 MMOL/L (ref 7–16)
AST SERPL-CCNC: 18 U/L (ref 12–45)
BILIRUB SERPL-MCNC: 0.2 MG/DL (ref 0.1–1.5)
BUN SERPL-MCNC: 6 MG/DL (ref 8–22)
CALCIUM SERPL-MCNC: 9.3 MG/DL (ref 8.5–10.5)
CHLORIDE SERPL-SCNC: 101 MMOL/L (ref 96–112)
CO2 SERPL-SCNC: 21 MMOL/L (ref 20–33)
CREAT SERPL-MCNC: 0.7 MG/DL (ref 0.5–1.4)
GLOBULIN SER CALC-MCNC: 3.8 G/DL (ref 1.9–3.5)
GLUCOSE SERPL-MCNC: 93 MG/DL (ref 65–99)
POTASSIUM SERPL-SCNC: 3.9 MMOL/L (ref 3.6–5.5)
PROT SERPL-MCNC: 7.4 G/DL (ref 6–8.2)
SODIUM SERPL-SCNC: 133 MMOL/L (ref 135–145)

## 2021-09-10 RX ORDER — LABETALOL 200 MG/1
200 TABLET, FILM COATED ORAL 2 TIMES DAILY
Qty: 60 TABLET | Refills: 0 | Status: ON HOLD | OUTPATIENT
Start: 2021-09-10 | End: 2021-09-17 | Stop reason: SDUPTHER

## 2021-09-10 ASSESSMENT — PAIN DESCRIPTION - PAIN TYPE: TYPE: ACUTE PAIN

## 2021-09-10 NOTE — ED PROVIDER NOTES
"[facility] LABOR AND DELIVERY TRIAGE PROGRESS NOTE    PATIENT ID:  NAME:  Leila Navarro  MRN:               [id number]  YOB: 1995     26 y.o. female  at 30w1d via LMP confirmed with 12wk US.    Subjective: Pt presents for evaluation of back pain and BH contractions onset the am, and progressive.  Back pain has become constant with some relief with tylenol.  Also complaining of constant headache for the past week and occasional floaters in her vision when she stands or lays down.  Does have mild abdominal pain as well.  Prior pregnancy was IOL for preeclampsia    positive  For CTXS.   positive Feels pain   negative for LOF  negative for vaginal bleeding.   positive for fetal movement    ROS: Patient denies any fever chills, nausea, vomiting, chest pain, shortness of breath, or dysuria or unusual swelling of hands or feet.     Objective:    Vitals:    21 2123 21 2124 21 2156   BP: 140/97 140/97 126/87   Pulse: (!) 109 (!) 109 (!) 105   Resp: 15 15    Temp: 36.6 °C (97.9 °F) 36.6 °C (97.9 °F)    TempSrc: Temporal Temporal    Weight: 87.5 kg (193 lb)     Height: 1.702 m (5' 7\")       Temp (24hrs), Av.6 °C (97.9 °F), Min:36.6 °C (97.9 °F), Max:36.6 °C (97.9 °F)    General: Mild acute distress, resting comfortably in bed.  HEENT: normocephalic, nontraumatic, EOMI  Cardiovascular: Heart RRR with no murmurs, rubs or gallops. Distal Pulses 2+  Respiratory: symmetric chest expansion, lungs CTAB, with no wheezes, rales, rhonci  Abdomen: gravid, mild TTP over RUQ and midepigastrium but pain is felt in back when palpating abdomen.  Musculoskeletal: strength 5/5 in four extremities  Neuro: non focal with no numbness, tingling or changes in sensation    Cervix:  Closed/thick/high  [place]: No uterine contractions   FHRM: Baseline 140, Accels present, no decels, moderate variability    MDM:    10:00 - On initial evaluation, pt in pain from back, but stable, tolerates interview and exam well.  " Complaining of headache, occasional vision changes for the past week and does have RUQ/midepigastric tenderness on exam.  Initial /97, concerned for PreE with SF.    Repeat BP resolved, 126/87.  Pt reporting poor hydration.  Suspect symptoms as well as pain could be related to hydration status rather than true severe features of PreE.  Will hydrate and eval PIH labs.    12:00 - PIH labs mostly nonconcerning, mild elevation of Pr:Cr.  CMP was not drawn.  Pt reporting improvement in headache, no vision changes while here, some improvement in back pain with tylenol and hydration.  Has been drinking nonstop per RN.  Bps continue to be elevated, with DBP>90.  Will await CMP.    12:30 - CMP reassuring.  Pt with occasional contractions on the monitor, considering back pain cervical check confirmed closed, thick, and high.  Plan for D/c to home with bid labetalol and followup BP check on Monday with return precautions.    Assessment: 26 y.o. female  at 30w1d with Dehydration and gestational hypertension concerning for Preeclampsia.    Plan:   1. Patient is cleared to return home with family. Encouraged to see MD for increased painful uterine contractions @ 3-5, vaginal bleeding, loss of fluid, or other serious symptoms including headache, LUQ pain, vision change, chest pain, SOB.  2. Labetalol bid  3. Clinic appointment Monday - f/u BP    Discussed case with Dr. Qiu, [facility] Attending. Case was discussed and attending agreed with plan prior to discharge of patient.    Hugh Salgado M.D.

## 2021-09-10 NOTE — PROGRESS NOTES
2140 [facility] office called, page put out to OB resident    2155 [facility] Dr. MEADOWS called back and notified SBAR and pt c/o of H/A, dizziness, and blurry vision and recent blood pressures. Per MD, he will be in to see patient    2215 Dr. Salgado at bedside to evaluate patient     2235 Lab at bedside, Orders for tylenol and PO hydration, pt educated on staying hydrated during pregnancy    0030 POC discussed with Dr. Gabriel. SVE    0102 D/C instructions provided to patient. Patient verbalized understanding and signed paper. Patient left walking with all belongings.

## 2021-09-17 ENCOUNTER — HOSPITAL ENCOUNTER (EMERGENCY)
Facility: MEDICAL CENTER | Age: 26
End: 2021-09-17
Admitting: STUDENT IN AN ORGANIZED HEALTH CARE EDUCATION/TRAINING PROGRAM
Payer: MEDICAID

## 2021-09-17 VITALS
WEIGHT: 194 LBS | HEIGHT: 67 IN | BODY MASS INDEX: 30.45 KG/M2 | DIASTOLIC BLOOD PRESSURE: 102 MMHG | HEART RATE: 114 BPM | SYSTOLIC BLOOD PRESSURE: 147 MMHG

## 2021-09-17 DIAGNOSIS — R51.9 HEADACHE ABOVE THE EYE REGION: ICD-10-CM

## 2021-09-17 LAB
ALBUMIN SERPL BCP-MCNC: 3.6 G/DL (ref 3.2–4.9)
ALBUMIN/GLOB SERPL: 1 G/DL
ALP SERPL-CCNC: 124 U/L (ref 30–99)
ALT SERPL-CCNC: 21 U/L (ref 2–50)
ANION GAP SERPL CALC-SCNC: 16 MMOL/L (ref 7–16)
APPEARANCE UR: CLEAR
AST SERPL-CCNC: 19 U/L (ref 12–45)
BASOPHILS # BLD AUTO: 0.3 % (ref 0–1.8)
BASOPHILS # BLD: 0.02 K/UL (ref 0–0.12)
BILIRUB SERPL-MCNC: 0.2 MG/DL (ref 0.1–1.5)
BILIRUB UR QL STRIP.AUTO: NEGATIVE
BUN SERPL-MCNC: 10 MG/DL (ref 8–22)
CALCIUM SERPL-MCNC: 9.5 MG/DL (ref 8.5–10.5)
CHLORIDE SERPL-SCNC: 101 MMOL/L (ref 96–112)
CO2 SERPL-SCNC: 18 MMOL/L (ref 20–33)
COLOR UR: YELLOW
CREAT SERPL-MCNC: 0.57 MG/DL (ref 0.5–1.4)
CREAT UR-MCNC: 217.18 MG/DL
EOSINOPHIL # BLD AUTO: 0.06 K/UL (ref 0–0.51)
EOSINOPHIL NFR BLD: 0.8 % (ref 0–6.9)
ERYTHROCYTE [DISTWIDTH] IN BLOOD BY AUTOMATED COUNT: 39.1 FL (ref 35.9–50)
GLOBULIN SER CALC-MCNC: 3.5 G/DL (ref 1.9–3.5)
GLUCOSE SERPL-MCNC: 105 MG/DL (ref 65–99)
GLUCOSE UR STRIP.AUTO-MCNC: NEGATIVE MG/DL
HCT VFR BLD AUTO: 33.1 % (ref 37–47)
HGB BLD-MCNC: 10.6 G/DL (ref 12–16)
IMM GRANULOCYTES # BLD AUTO: 0.06 K/UL (ref 0–0.11)
IMM GRANULOCYTES NFR BLD AUTO: 0.8 % (ref 0–0.9)
KETONES UR STRIP.AUTO-MCNC: ABNORMAL MG/DL
LEUKOCYTE ESTERASE UR QL STRIP.AUTO: NEGATIVE
LYMPHOCYTES # BLD AUTO: 1.46 K/UL (ref 1–4.8)
LYMPHOCYTES NFR BLD: 18.6 % (ref 22–41)
MCH RBC QN AUTO: 24.8 PG (ref 27–33)
MCHC RBC AUTO-ENTMCNC: 32 G/DL (ref 33.6–35)
MCV RBC AUTO: 77.5 FL (ref 81.4–97.8)
MICRO URNS: ABNORMAL
MONOCYTES # BLD AUTO: 0.5 K/UL (ref 0–0.85)
MONOCYTES NFR BLD AUTO: 6.4 % (ref 0–13.4)
NEUTROPHILS # BLD AUTO: 5.73 K/UL (ref 2–7.15)
NEUTROPHILS NFR BLD: 73.1 % (ref 44–72)
NITRITE UR QL STRIP.AUTO: NEGATIVE
NRBC # BLD AUTO: 0 K/UL
NRBC BLD-RTO: 0 /100 WBC
PH UR STRIP.AUTO: 6 [PH] (ref 5–8)
PLATELET # BLD AUTO: 397 K/UL (ref 164–446)
PMV BLD AUTO: 9.8 FL (ref 9–12.9)
POTASSIUM SERPL-SCNC: 3.8 MMOL/L (ref 3.6–5.5)
PROT SERPL-MCNC: 7.1 G/DL (ref 6–8.2)
PROT UR QL STRIP: NEGATIVE MG/DL
PROT UR-MCNC: 30 MG/DL (ref 0–15)
PROT/CREAT UR: 138 MG/G (ref 10–107)
RBC # BLD AUTO: 4.27 M/UL (ref 4.2–5.4)
RBC UR QL AUTO: NEGATIVE
SODIUM SERPL-SCNC: 135 MMOL/L (ref 135–145)
SP GR UR STRIP.AUTO: 1.03
UROBILINOGEN UR STRIP.AUTO-MCNC: 1 MG/DL
WBC # BLD AUTO: 7.8 K/UL (ref 4.8–10.8)

## 2021-09-17 PROCEDURE — 302449 STATCHG TRIAGE ONLY (STATISTIC)

## 2021-09-17 PROCEDURE — A9270 NON-COVERED ITEM OR SERVICE: HCPCS | Performed by: STUDENT IN AN ORGANIZED HEALTH CARE EDUCATION/TRAINING PROGRAM

## 2021-09-17 PROCEDURE — 80053 COMPREHEN METABOLIC PANEL: CPT

## 2021-09-17 PROCEDURE — 82570 ASSAY OF URINE CREATININE: CPT

## 2021-09-17 PROCEDURE — 81003 URINALYSIS AUTO W/O SCOPE: CPT

## 2021-09-17 PROCEDURE — 59025 FETAL NON-STRESS TEST: CPT

## 2021-09-17 PROCEDURE — 85025 COMPLETE CBC W/AUTO DIFF WBC: CPT

## 2021-09-17 PROCEDURE — 36415 COLL VENOUS BLD VENIPUNCTURE: CPT

## 2021-09-17 PROCEDURE — 84156 ASSAY OF PROTEIN URINE: CPT

## 2021-09-17 PROCEDURE — 700102 HCHG RX REV CODE 250 W/ 637 OVERRIDE(OP): Performed by: STUDENT IN AN ORGANIZED HEALTH CARE EDUCATION/TRAINING PROGRAM

## 2021-09-17 RX ORDER — BUTALBITAL, ACETAMINOPHEN AND CAFFEINE 50; 325; 40 MG/1; MG/1; MG/1
1 TABLET ORAL ONCE
Status: COMPLETED | OUTPATIENT
Start: 2021-09-17 | End: 2021-09-17

## 2021-09-17 RX ORDER — BUTALBITAL, ACETAMINOPHEN AND CAFFEINE 50; 325; 40 MG/1; MG/1; MG/1
1 TABLET ORAL EVERY 4 HOURS PRN
Qty: 30 TABLET | Refills: 0 | Status: SHIPPED | OUTPATIENT
Start: 2021-09-17 | End: 2021-10-17

## 2021-09-17 RX ORDER — LABETALOL 100 MG/1
200 TABLET, FILM COATED ORAL TWICE DAILY
Status: DISCONTINUED | OUTPATIENT
Start: 2021-09-17 | End: 2021-09-18 | Stop reason: HOSPADM

## 2021-09-17 RX ORDER — LABETALOL 200 MG/1
200 TABLET, FILM COATED ORAL 2 TIMES DAILY
Qty: 180 TABLET | Refills: 0 | Status: SHIPPED | OUTPATIENT
Start: 2021-09-17

## 2021-09-17 RX ADMIN — BUTALBITAL, ACETAMINOPHEN, AND CAFFEINE 1 TABLET: 50; 325; 40 TABLET ORAL at 19:04

## 2021-09-17 RX ADMIN — LABETALOL HYDROCHLORIDE 200 MG: 100 TABLET, FILM COATED ORAL at 22:10

## 2021-09-17 ASSESSMENT — FIBROSIS 4 INDEX
FIB4 SCORE: 0.25
FIB4 SCORE: 0.25

## 2021-09-18 NOTE — PROGRESS NOTES
1720-Pt here with c/o dizzyness and HA. Pt states she got dizzy and fell to her knees. Pt did not hit abdomen, Pt Denies any contractions, Leaking or bleeding and reports + FM. Pt placed on monitors (us and toco) POC dicussed with pt. Pt denies any needs at this time.   1743-Resident on call paged  1749-DR Salgado called, report given. Orders received. MD will be in to see pt.   1800-Pt updated. Denies any quesitons  1900-Report given to Sherry RAMIREZ

## 2021-09-18 NOTE — PROGRESS NOTES
Received beside report from Jen RN; Patient is a , EDC of  with a gestational age of 31w2d. RN assuming care of patient; all questions answered; patient resting comfortably in bed, denies needs at this time; vital signs in stable condition; patient educated regarding call light system; call light and patient belongings in reach; patient encouraged to call RN for any needs, wants, desires or concerns. Whiteboard updated.     UA & p/c ratio sent to lab.    Called lab regarding status of CMP, will need to place it manually, eta about 20 minutes.    Dr. Salgado notified of lab results, MD will come to bedside, and discuss with patient.    MD at bedside discussing plan of care with patient, discussing having patient seen by OB for the remainder of her pregnancy, patient and mother (on facet), agreeable to plan of care. Patient is to get her evening labetalol and then if bp remains 140/90 and below patient can be d/c home. Patient knows to make appointment with [facility] for Monday to be seen.    Kenny asked about schedule patient for appointment, CNKYLAH able to schedule appointment for  at 11:15, patient agreeable to date and time.      RN at bedside, discharge instructions reviewed and given to patient, all questions answered. Patient discharged in stable condition,  given  labor precautions and high bp precautions. Patient ambulated to private car. Handwashing and discharge instructions given.  All questions answered and patient knows to keep her scheduled appointment with [facility] on .

## 2021-09-18 NOTE — ED PROVIDER NOTES
"[facility] LABOR AND DELIVERY TRIAGE PROGRESS NOTE    PATIENT ID:  NAME:  Leila Navarro  MRN:               [id number]  YOB: 1995     26 y.o. female  at 31w2d.    Subjective: Pt presents today sent in primary midwife for concerns regarding dizziness as well as hypertension.  Patient was seen for the same on , and was discharged on labetalol twice daily with outpatient follow-up at that time.  Was seen in the clinic  and taken off labetalol, as her blood pressures were normal at that time.    Throughout her pregnancy, patient has had progressively worsening dizziness, that was initially upon standing, but now is persistent and especially with standing or with sudden movements.  Earlier today patient turned quickly, experienced room spinning dizziness, and fell to her knee-did not fall on her gravid abdomen.  Denies loss of consciousness, but is experiencing severe headache that has not resolved with 1X regular Tylenol and 2X extra strength Tylenol.  Of note she also reports tachycardia/palpitations with her dizziness, is currently tachycardic in triage, but does not feel dizzy at this time.    Headache is primarily frontal, bilateral and behind her eyes.  Throbbing pain, not associated with conjunctival injection, tearing, photophobia, phonophobia.    Previous pregnancy complicated by preeclampsia requiring IOL at 37 weeks    negative  For CTXS.   positive Feels pain-headache, no abdominal pain  negative for LOF  negative for vaginal bleeding.   positive for fetal movement    ROS: Patient denies any fever chills, nausea, vomiting, chest pain, shortness of breath, or dysuria or unusual swelling of hands or feet.     Objective:    Vitals:    21 1748   Weight: 88 kg (194 lb 0.1 oz)   Height: 1.702 m (5' 7.01\")     No data recorded.    General: Mild acute distress due to headache, resting in bed, able to talk, open eyes, watch television, participate with exam.  HEENT: normocephalic, " nontraumatic, PERRLA, EOMI  Cardiovascular: Heart RRR with no murmurs, rubs or gallops. Distal Pulses 2+  Respiratory: symmetric chest expansion, lungs CTAB, with no wheezes, rales, rhonci  Abdomen: gravid, nontender  Musculoskeletal: strength 5/5 in four extremities  Neuro: non focal with no numbness, tingling or changes in sensation    Cervix: Deferred  [place]: No uterine contractions  FHRM: Baseline 140, Accels present, no decels, moderate variability    MDM:   18:30: Evaluated at bedside, in mild distress from headache.  Pressures up to 138/93, and remains tachycardic to 115 (improved from 130s on arrival).  Was taken off labetalol outpatient.  Will repeat PIH labs, attempt to treat headache with Fioricet, hydration.    19:30: Reeval at bedside. Improvement in headache after Fioricet.  BP cycled and persistently [id number].  CBC back with slightly worse microcytic anemia.    21:00: Labs resulted.  Pr:Cr elevated and slightly higher than last week.  Remainder of labs overall within appropriate limits.  Bps have remained elevated at 120s/90s - 140s/90s, headache is improved but still 5/10.  Discussed with patient - had panic attack concerned for how she will care for other daughter while in and out of triage with high pressures.  Provided reassurance regarding treatment of Bps, monitoring with home cuff, and monitoring under OB care.  Calmer and appreciative of having a plan moving forward.    Understanding and agreeable with plan, will proceed with d/c to home    Assessment: 26 y.o. female  at 31w2d, presenting again with headache, dizziness, and high blood pressures.  Headache and Gestational Hypertension    Plan:   1. Patient is cleared to return home with family. Encouraged to see MD for increased painful uterine contractions @ 3-5, vaginal bleeding, loss of fluid, or other serious symptoms.  2.  Resume taking labetalol twice daily for gestational hypertension.  3.  Acetaminophen as needed for  headache.  Fioricet if severe headache not improved with tylenol.  4.  Due to risk of pregnancy with gestational hypertension, recommend transfer of care to OB/GYN.    Discussed case with Dr. Ken, [facility] Attending. Case was discussed and attending agreed with plan prior to discharge of patient.    Hugh Salgado M.D.

## 2021-09-21 ENCOUNTER — TELEPHONE (OUTPATIENT)
Dept: OBGYN | Facility: CLINIC | Age: 26
End: 2021-09-21

## 2021-09-21 ENCOUNTER — APPOINTMENT (OUTPATIENT)
Dept: OBGYN | Facility: CLINIC | Age: 26
End: 2021-09-21
Payer: MEDICAID

## 2021-09-21 ENCOUNTER — INITIAL PRENATAL (OUTPATIENT)
Dept: OBGYN | Facility: CLINIC | Age: 26
End: 2021-09-21
Payer: MEDICAID

## 2021-09-21 VITALS
BODY MASS INDEX: 30.76 KG/M2 | DIASTOLIC BLOOD PRESSURE: 82 MMHG | WEIGHT: 196 LBS | SYSTOLIC BLOOD PRESSURE: 124 MMHG | HEIGHT: 67 IN

## 2021-09-21 DIAGNOSIS — O13.3 GESTATIONAL HYPERTENSION, THIRD TRIMESTER: ICD-10-CM

## 2021-09-21 DIAGNOSIS — O09.893 SUPERVISION OF OTHER HIGH RISK PREGNANCIES, THIRD TRIMESTER: Primary | ICD-10-CM

## 2021-09-21 PROCEDURE — 0500F INITIAL PRENATAL CARE VISIT: CPT | Performed by: NURSE PRACTITIONER

## 2021-09-21 ASSESSMENT — ENCOUNTER SYMPTOMS
CONSTITUTIONAL NEGATIVE: 1
MUSCULOSKELETAL NEGATIVE: 1
PHOTOPHOBIA: 1
GASTROINTESTINAL NEGATIVE: 1
HEADACHES: 1
CARDIOVASCULAR NEGATIVE: 1
PSYCHIATRIC NEGATIVE: 1
RESPIRATORY NEGATIVE: 1

## 2021-09-21 ASSESSMENT — EDINBURGH POSTNATAL DEPRESSION SCALE (EPDS)
I HAVE BEEN SO UNHAPPY THAT I HAVE BEEN CRYING: NO, NEVER
I HAVE FELT SCARED OR PANICKY FOR NO GOOD REASON: NO, NOT AT ALL
I HAVE BLAMED MYSELF UNNECESSARILY WHEN THINGS WENT WRONG: NOT VERY OFTEN
I HAVE BEEN SO UNHAPPY THAT I HAVE HAD DIFFICULTY SLEEPING: NOT AT ALL
TOTAL SCORE: 1
I HAVE LOOKED FORWARD WITH ENJOYMENT TO THINGS: AS MUCH AS I EVER DID
THINGS HAVE BEEN GETTING ON TOP OF ME: NO, I HAVE BEEN COPING AS WELL AS EVER
THE THOUGHT OF HARMING MYSELF HAS OCCURRED TO ME: NEVER
I HAVE FELT SAD OR MISERABLE: NO, NOT AT ALL
I HAVE BEEN ABLE TO LAUGH AND SEE THE FUNNY SIDE OF THINGS: AS MUCH AS I ALWAYS COULD
I HAVE BEEN ANXIOUS OR WORRIED FOR NO GOOD REASON: NO, NOT AT ALL

## 2021-09-21 ASSESSMENT — FIBROSIS 4 INDEX: FIB4 SCORE: 0.25

## 2021-09-21 NOTE — NON-PROVIDER
Subjective:   Leila Navarro is a 26 y.o.  who presents for her new OB exam.  She is 31w6d with an SUBHA of Estimated Date of Delivery: 21 by patient reported. She is feeling well and has no concerns at this time. Denies VB, LOF, contractions or pain. Patient is transferring care from [facility] and was seen in triage on 21. Denies dysuria, vaginal DC, fever. Reports good fetal movement. Was prescribed Fioricet in triage. Patient reports headache she took 2 of her Fioricet about an hour ago with no relief. Patient states she is drinking at least a gallon of water daily.     Past Medical History:   Diagnosis Date   • Hypertension    Review of Systems   Constitutional: Negative.    HENT: Negative.    Eyes: Positive for photophobia.   Respiratory: Negative.    Cardiovascular: Negative.    Gastrointestinal: Negative.    Genitourinary: Negative.    Musculoskeletal: Negative.    Skin: Negative.    Neurological: Positive for headaches.        Patient describes headaches as behind her eyes and feels like her eyes are pulsating   Endo/Heme/Allergies: Negative.    Psychiatric/Behavioral: Negative.          Psych Hx: Patient denies any history of depression, anxiety, PTSD, bipolar or any other psychological issues.     EPDS today: 1    History reviewed. No pertinent surgical history.   Physical Exam  Vitals and nursing note reviewed.   Constitutional:       Appearance: Normal appearance. She is obese.   HENT:      Head: Normocephalic and atraumatic.      Right Ear: External ear normal.      Left Ear: External ear normal.      Nose: Nose normal.   Eyes:      Extraocular Movements: Extraocular movements intact.      Conjunctiva/sclera: Conjunctivae normal.   Pulmonary:      Effort: Pulmonary effort is normal.   Musculoskeletal:         General: Normal range of motion.      Cervical back: Normal range of motion.   Skin:     General: Skin is warm and dry.   Neurological:      General: No focal deficit present.       Mental Status: She is alert and oriented to person, place, and time. Mental status is at baseline.   Psychiatric:         Mood and Affect: Mood normal.         Behavior: Behavior normal.         Thought Content: Thought content normal.         Judgment: Judgment normal.         OB History    Para Term  AB Living   2 1 1     1   SAB TAB Ectopic Molar Multiple Live Births           0 1      # Outcome Date GA Lbr Emanuel/2nd Weight Sex Delivery Anes PTL Lv   2 Current            1 Term 10/25/18 37w1d 01:45 / 00:59 2.57 kg (5 lb 10.7 oz) F Vag-Spont EPI N LUISA        Gynecological Hx: Denies any hx of STIs, including HSV. Denies any vulvovaginal disorders and no hx of abnormal cervical cytology. Last pap     Sexual Hx: One current male partner, who is  FOB     Contraceptive Hx: Has used pills and IUD in the past and has since discontinued use.     Denies any genetic disorders in family history.     Social History     Socioeconomic History   • Marital status: Single     Spouse name: Not on file   • Number of children: Not on file   • Years of education: Not on file   • Highest education level: Not on file   Occupational History   • Not on file   Tobacco Use   • Smoking status: Never Smoker   • Smokeless tobacco: Never Used   Vaping Use   • Vaping Use: Never used   Substance and Sexual Activity   • Alcohol use: No   • Drug use: No   • Sexual activity: Not on file   Other Topics Concern   • Not on file   Social History Narrative   • Not on file     Social Determinants of Health     Financial Resource Strain:    • Difficulty of Paying Living Expenses:    Food Insecurity:    • Worried About Running Out of Food in the Last Year:    • Ran Out of Food in the Last Year:    Transportation Needs:    • Lack of Transportation (Medical):    • Lack of Transportation (Non-Medical):    Physical Activity:    • Days of Exercise per Week:    • Minutes of Exercise per Session:    Stress:    • Feeling of Stress :    Social  "Connections:    • Frequency of Communication with Friends and Family:    • Frequency of Social Gatherings with Friends and Family:    • Attends Denominational Services:    • Active Member of Clubs or Organizations:    • Attends Club or Organization Meetings:    • Marital Status:    Intimate Partner Violence:    • Fear of Current or Ex-Partner:    • Emotionally Abused:    • Physically Abused:    • Sexually Abused:        JUNIOR is involved and lives with Leila Navarro.  Pregnancy is unplanned but not prevented and is desired   She is currently not working, denies any heavy lifting or exposure to potential teratogens like environmental or occupational toxins.   Denies alcohol use, drug use, or tobacco use in pregnancy.   Denies any current or hx of sexual, emotional or physical abuse or trauma.     Current Medications: PNV and Labetolol  Allergies: Denies allergies to medications, food, or environmental allergies    Objective:      Vitals:    09/21/21 1119   BP: 124/82   Weight: 88.9 kg (196 lb)   Height: 1.702 m (5' 7\")        See Prenatal Physical and Prenatal Vitals  UA WNL today      Assessment:      1.  IUP @ 31w6d per patient report      2.  S=D      3.  Gestational hypertension with headache on medication                         Plan:   - Continue labatelol  - Make appointment with MD next visit.  - P to C, CMP, and CBC labs ordered to get done today.    - NOB informational packet with anticipatory guidance given  - S/sx of pregnancy warning signs and PTL precautions given  - Go to L & D if headache does not improve.  - Return to [facility] in 1 wks     "

## 2021-09-21 NOTE — LETTER
"Count Your Baby's Movements  Another step to a healthy delivery    Leila Navarro             Dept: [phone]    How Many Weeks Pregnant= 31w6d    Date to Begin Countin21              How to use this chart    One way for your physician to keep track of your baby's health is by knowing how often the baby moves (or \"kicks\") in your womb.  You can help your physician to do this by using this chart every day.    Every day, you should see how many hours it takes for your baby to move 10 times.  Start in the morning, as soon as you get up.    · First, write down the time your baby moves until you get to 10.  · Check off one box every time your baby moves until you get to 10.  · Write down the time you finished counting in the last column.  · Total how long it took to count up all 10 movements.  · Finally, fill in the box that shows how long this took.  After counting 10 movements, you no longer have to count any more that day.  The next morning, just start counting again as soon as you get up.    What should you call a \"movement\"?  It is hard to say, because it will feel different from one mother to another and from one pregnancy to the next.  The important thing is that you count the movements the same way throughout your pregnancy.  If you have more questions, you should ask your physician.    Count carefully every day!  SAMPLE:  Week 28    How many hours did it take to feel 10 movements?       Start  Time     1     2     3     4     5     6     7     8     9     10   Finish Time   Mon 8:20 ·  ·  ·  ·  ·  ·  ·  ·  ·  ·  11:40                  Sat               Sun                 IMPORTANT: You should contact your physician if it takes more than two hours for you to feel 10 movements.  Each morning, write down the time and start to count the movements of your baby.  Keep track by checking off one box every time you feel one movement.  When you have felt " "10 \"kicks\", write down the time you finished counting in the last column.  Then fill in the   box (over the check kristal) for the number of hours it took.  Be sure to read the complete instructions on the previous page.            "

## 2021-09-21 NOTE — PROGRESS NOTES
NOB today. ABDULLAHI from [facility]. Patient had labs done including diabetes screening. States had TDap already. US done. Per patient everything was okay except high blood pressure.  Last pap: done at [facility]  Phone # [phone]  Pharmacy confirmed  On PNV  (+) FM, denies VB, LOF  C/o headaches and feeling dizzy.  Kick count sheet given today.  BTL declined

## 2021-09-21 NOTE — TELEPHONE ENCOUNTER
Call to patient to let her know she left without signing release of medical records to get them from [facility]. patient agrees to come tomorrow to sign.

## 2021-09-21 NOTE — PROGRESS NOTES
"S:  Leila Navarro is a 26 y.o.  who presents for her new OB exam.  She is 31w6d with and SUBHA of Estimated Date of Delivery: 21 based off of patient report . She is not happy with the care she received at [facility], as she was put on medication for BP then taken off, then put back on again and doesn't feel like they are appropriate in her management of her BP.  She reports all labs and US have been completed and up to date and we will request records today.      She states her first pregnancy she also had elevated BP and was induced at 37 weeks.       Denies VB, LOF, or cramping.  She does report a headache currently which she took Fioricet about 1 hour ago.  She states it feels like \"eyes are pulsating\".  She also has some floaters right now.  She is dizzy most times she stands up, and did go to hospital on  because she was so dizzy she fell.      Pt is  and lives with FOB and other child.  Pregnancy is unplanned but welcomed.      Discussed diet and exercise during pregnancy. Encouraged good nutrition, and daily exercise including walking or swimming. Discussed expected weight gain during pregnancy. Discussed adequate hydration during pregnancy.  Review of Systems   Constitutional: Negative.    HENT: Negative.    Eyes: Positive for photophobia.   Respiratory: Negative.    Cardiovascular: Negative.    Gastrointestinal: Negative.    Genitourinary: Negative.    Musculoskeletal: Negative.    Skin: Negative.    Neurological: Positive for headaches.   Endo/Heme/Allergies: Negative.    Psychiatric/Behavioral: Negative.    All other systems reviewed and are negative.      Past Medical History:   Diagnosis Date   • Hypertension    • Migraine      History reviewed. No pertinent family history.  Social History     Socioeconomic History   • Marital status: Single     Spouse name: Not on file   • Number of children: Not on file   • Years of education: Not on file   • Highest education level: Not on file " "  Occupational History   • Not on file   Tobacco Use   • Smoking status: Never Smoker   • Smokeless tobacco: Never Used   Vaping Use   • Vaping Use: Never used   Substance and Sexual Activity   • Alcohol use: No   • Drug use: No   • Sexual activity: Not on file   Other Topics Concern   • Not on file   Social History Narrative   • Not on file     Social Determinants of Health     Financial Resource Strain:    • Difficulty of Paying Living Expenses:    Food Insecurity:    • Worried About Running Out of Food in the Last Year:    • Ran Out of Food in the Last Year:    Transportation Needs:    • Lack of Transportation (Medical):    • Lack of Transportation (Non-Medical):    Physical Activity:    • Days of Exercise per Week:    • Minutes of Exercise per Session:    Stress:    • Feeling of Stress :    Social Connections:    • Frequency of Communication with Friends and Family:    • Frequency of Social Gatherings with Friends and Family:    • Attends [employer] Services:    • Active Member of Clubs or Organizations:    • Attends Club or Organization Meetings:    • Marital Status:    Intimate Partner Violence:    • Fear of Current or Ex-Partner:    • Emotionally Abused:    • Physically Abused:    • Sexually Abused:      OB History    Para Term  AB Living   2 1 1     1   SAB TAB Ectopic Molar Multiple Live Births           0 1      # Outcome Date GA Lbr Emanuel/2nd Weight Sex Delivery Anes PTL Lv   2 Current            1 Term 10/25/18 37w1d 01:45 / 00:59 2.57 kg (5 lb 10.7 oz) F Vag-Spont EPI N LUISA         O:  /82   Ht 1.702 m (5' 7\")   Wt 88.9 kg (196 lb)    See Prenatal Physical.    Wet mount: deferred  Physical Exam  Vitals and nursing note reviewed.   Constitutional:       Appearance: Normal appearance.   HENT:      Head: Normocephalic and atraumatic.      Right Ear: External ear normal.      Left Ear: External ear normal.      Nose: Nose normal.      Mouth/Throat:      Mouth: Mucous membranes are moist. " "     Pharynx: Oropharynx is clear.   Eyes:      Extraocular Movements: Extraocular movements intact.      Conjunctiva/sclera: Conjunctivae normal.      Pupils: Pupils are equal, round, and reactive to light.   Cardiovascular:      Rate and Rhythm: Normal rate and regular rhythm.      Pulses: Normal pulses.      Heart sounds: Normal heart sounds.   Pulmonary:      Effort: Pulmonary effort is normal. No respiratory distress.      Breath sounds: Normal breath sounds.   Abdominal:      Palpations: Abdomen is soft.      Tenderness: There is no abdominal tenderness.   Musculoskeletal:         General: Normal range of motion.      Cervical back: Normal range of motion and neck supple.   Skin:     General: Skin is warm and dry.      Capillary Refill: Capillary refill takes less than 2 seconds.   Neurological:      General: No focal deficit present.      Mental Status: She is alert and oriented to person, place, and time. Mental status is at baseline.   Psychiatric:         Mood and Affect: Mood normal.         Behavior: Behavior normal.         Thought Content: Thought content normal.         Judgment: Judgment normal.             A:   1.  IUP @ 31w6d per pt report        2.  S=D        3.  See problem list below        4.  Transfer of prenatal care        5. Current headache and \"floaters\"         6. Normal pre-e labs on 9/17       Patient Active Problem List    Diagnosis Date Noted   • Gestational hypertension, third trimester 09/21/2021         P:  1.  Pt to give Fioricet about another hour to work for her headache.  If not resolved to go to L&d for assessment.  Given normotensive status, given pre-e labs to do now, and pt agreeable to same.  Reviewed s/s of pre-e.  Encouraged to maintain adequate hydration, small frequent meals to maintain blood sugar stability and to progress slowly from position changes to reduce orthostatic hypotension.    2. Records requested from [facility].  Will order repeat US for growth next visit, " once able to review records.  3. F/u 1 week with MD.      Orders Placed This Encounter   • Comp Metabolic Panel   • CBC WITH DIFFERENTIAL   • PROTEIN/CREAT RATIO URINE   • PRENATAL VIT-DOCUSATE-IRON-FA PO

## 2021-09-23 ENCOUNTER — HOSPITAL ENCOUNTER (OUTPATIENT)
Facility: MEDICAL CENTER | Age: 26
End: 2021-09-26
Attending: OBSTETRICS & GYNECOLOGY | Admitting: OBSTETRICS & GYNECOLOGY
Payer: MEDICAID

## 2021-09-23 LAB
ALBUMIN SERPL BCP-MCNC: 3.6 G/DL (ref 3.2–4.9)
ALBUMIN/GLOB SERPL: 1 G/DL
ALP SERPL-CCNC: 131 U/L (ref 30–99)
ALT SERPL-CCNC: 95 U/L (ref 2–50)
ANION GAP SERPL CALC-SCNC: 11 MMOL/L (ref 7–16)
AST SERPL-CCNC: 61 U/L (ref 12–45)
BASOPHILS # BLD AUTO: 0.2 % (ref 0–1.8)
BASOPHILS # BLD: 0.02 K/UL (ref 0–0.12)
BILIRUB SERPL-MCNC: 0.3 MG/DL (ref 0.1–1.5)
BUN SERPL-MCNC: 5 MG/DL (ref 8–22)
CALCIUM SERPL-MCNC: 9.5 MG/DL (ref 8.5–10.5)
CHLORIDE SERPL-SCNC: 104 MMOL/L (ref 96–112)
CO2 SERPL-SCNC: 20 MMOL/L (ref 20–33)
CREAT SERPL-MCNC: 0.58 MG/DL (ref 0.5–1.4)
CREAT UR-MCNC: 84.34 MG/DL
EOSINOPHIL # BLD AUTO: 0.05 K/UL (ref 0–0.51)
EOSINOPHIL NFR BLD: 0.6 % (ref 0–6.9)
ERYTHROCYTE [DISTWIDTH] IN BLOOD BY AUTOMATED COUNT: 39.1 FL (ref 35.9–50)
GLOBULIN SER CALC-MCNC: 3.6 G/DL (ref 1.9–3.5)
GLUCOSE SERPL-MCNC: 82 MG/DL (ref 65–99)
HCT VFR BLD AUTO: 33.3 % (ref 37–47)
HGB BLD-MCNC: 10.7 G/DL (ref 12–16)
IMM GRANULOCYTES # BLD AUTO: 0.1 K/UL (ref 0–0.11)
IMM GRANULOCYTES NFR BLD AUTO: 1.1 % (ref 0–0.9)
LYMPHOCYTES # BLD AUTO: 1.43 K/UL (ref 1–4.8)
LYMPHOCYTES NFR BLD: 16.4 % (ref 22–41)
MCH RBC QN AUTO: 24.7 PG (ref 27–33)
MCHC RBC AUTO-ENTMCNC: 32.1 G/DL (ref 33.6–35)
MCV RBC AUTO: 76.7 FL (ref 81.4–97.8)
MONOCYTES # BLD AUTO: 0.53 K/UL (ref 0–0.85)
MONOCYTES NFR BLD AUTO: 6.1 % (ref 0–13.4)
NEUTROPHILS # BLD AUTO: 6.57 K/UL (ref 2–7.15)
NEUTROPHILS NFR BLD: 75.6 % (ref 44–72)
NRBC # BLD AUTO: 0 K/UL
NRBC BLD-RTO: 0 /100 WBC
PLATELET # BLD AUTO: 422 K/UL (ref 164–446)
PMV BLD AUTO: 10.3 FL (ref 9–12.9)
POTASSIUM SERPL-SCNC: 4.1 MMOL/L (ref 3.6–5.5)
PROT SERPL-MCNC: 7.2 G/DL (ref 6–8.2)
PROT UR-MCNC: 9 MG/DL (ref 0–15)
PROT/CREAT UR: 107 MG/G (ref 10–107)
RBC # BLD AUTO: 4.34 M/UL (ref 4.2–5.4)
SODIUM SERPL-SCNC: 135 MMOL/L (ref 135–145)
WBC # BLD AUTO: 8.7 K/UL (ref 4.8–10.8)

## 2021-09-23 PROCEDURE — 96372 THER/PROPH/DIAG INJ SC/IM: CPT

## 2021-09-23 PROCEDURE — 82570 ASSAY OF URINE CREATININE: CPT

## 2021-09-23 PROCEDURE — 700111 HCHG RX REV CODE 636 W/ 250 OVERRIDE (IP): Performed by: OBSTETRICS & GYNECOLOGY

## 2021-09-23 PROCEDURE — A9270 NON-COVERED ITEM OR SERVICE: HCPCS | Performed by: STUDENT IN AN ORGANIZED HEALTH CARE EDUCATION/TRAINING PROGRAM

## 2021-09-23 PROCEDURE — 99218 PR INITIAL OBSERVATION CARE,LEVL I: CPT | Mod: GC | Performed by: OBSTETRICS & GYNECOLOGY

## 2021-09-23 PROCEDURE — 84156 ASSAY OF PROTEIN URINE: CPT

## 2021-09-23 PROCEDURE — G0378 HOSPITAL OBSERVATION PER HR: HCPCS

## 2021-09-23 PROCEDURE — 85025 COMPLETE CBC W/AUTO DIFF WBC: CPT

## 2021-09-23 PROCEDURE — 700102 HCHG RX REV CODE 250 W/ 637 OVERRIDE(OP): Performed by: STUDENT IN AN ORGANIZED HEALTH CARE EDUCATION/TRAINING PROGRAM

## 2021-09-23 PROCEDURE — 59025 FETAL NON-STRESS TEST: CPT

## 2021-09-23 PROCEDURE — 99284 EMERGENCY DEPT VISIT MOD MDM: CPT

## 2021-09-23 PROCEDURE — 80053 COMPREHEN METABOLIC PANEL: CPT

## 2021-09-23 PROCEDURE — 700105 HCHG RX REV CODE 258: Performed by: OBSTETRICS & GYNECOLOGY

## 2021-09-23 RX ORDER — SODIUM CHLORIDE, SODIUM LACTATE, POTASSIUM CHLORIDE, CALCIUM CHLORIDE 600; 310; 30; 20 MG/100ML; MG/100ML; MG/100ML; MG/100ML
INJECTION, SOLUTION INTRAVENOUS ONCE
Status: COMPLETED | OUTPATIENT
Start: 2021-09-23 | End: 2021-09-24

## 2021-09-23 RX ORDER — MORPHINE SULFATE 4 MG/ML
2 INJECTION, SOLUTION INTRAMUSCULAR; INTRAVENOUS ONCE
Status: DISPENSED | OUTPATIENT
Start: 2021-09-23 | End: 2021-09-24

## 2021-09-23 RX ORDER — ACETAMINOPHEN 500 MG
1000 TABLET ORAL ONCE
Status: COMPLETED | OUTPATIENT
Start: 2021-09-23 | End: 2021-09-23

## 2021-09-23 RX ORDER — BETAMETHASONE SODIUM PHOSPHATE AND BETAMETHASONE ACETATE 3; 3 MG/ML; MG/ML
12 INJECTION, SUSPENSION INTRA-ARTICULAR; INTRALESIONAL; INTRAMUSCULAR; SOFT TISSUE EVERY 24 HOURS
Status: COMPLETED | OUTPATIENT
Start: 2021-09-23 | End: 2021-09-24

## 2021-09-23 RX ORDER — BETAMETHASONE SODIUM PHOSPHATE AND BETAMETHASONE ACETATE 3; 3 MG/ML; MG/ML
12 INJECTION, SUSPENSION INTRA-ARTICULAR; INTRALESIONAL; INTRAMUSCULAR; SOFT TISSUE EVERY 24 HOURS
Status: DISCONTINUED | OUTPATIENT
Start: 2021-09-23 | End: 2021-09-23 | Stop reason: HOSPADM

## 2021-09-23 RX ORDER — SUMATRIPTAN 50 MG/1
50 TABLET, FILM COATED ORAL ONCE
Status: COMPLETED | OUTPATIENT
Start: 2021-09-23 | End: 2021-09-23

## 2021-09-23 RX ADMIN — SODIUM CHLORIDE, POTASSIUM CHLORIDE, SODIUM LACTATE AND CALCIUM CHLORIDE: 600; 310; 30; 20 INJECTION, SOLUTION INTRAVENOUS at 15:00

## 2021-09-23 RX ADMIN — ACETAMINOPHEN 1000 MG: 500 TABLET ORAL at 16:41

## 2021-09-23 RX ADMIN — SUMATRIPTAN SUCCINATE 50 MG: 50 TABLET ORAL at 17:41

## 2021-09-23 RX ADMIN — BETAMETHASONE SODIUM PHOSPHATE AND BETAMETHASONE ACETATE 12 MG: 3; 3 INJECTION, SUSPENSION INTRA-ARTICULAR; INTRALESIONAL; INTRAMUSCULAR at 22:39

## 2021-09-23 ASSESSMENT — PAIN DESCRIPTION - PAIN TYPE: TYPE: ACUTE PAIN

## 2021-09-23 ASSESSMENT — FIBROSIS 4 INDEX: FIB4 SCORE: 0.25

## 2021-09-23 ASSESSMENT — LIFESTYLE VARIABLES
ALCOHOL_USE: NO
EVER_SMOKED: NEVER

## 2021-09-23 ASSESSMENT — PAIN SCALES - GENERAL: PAINLEVEL: 7

## 2021-09-23 NOTE — ED PROVIDER NOTES
OB H&P:    CC: Migraine Headache    HPI:  Ms. Lelia Navarro is a 26 y.o.  @ 32w1d by lmp pregnancy complicated by  history of preeclampsia. Pt states she has been having migraines for the past few weeks. Was prescribed fioricet last week by [facility].  This morning she woke up and was feeling fine but after about 30 minutes started developing a headache patient took some Tylenol and then later some Fioricet when the headache was getting worse at this time no change.  Currently denies any vision changes but has some light sensitivity and sound sensitivity.  Rates the pain as 9 out of 10.  Currently patient has no other concerns she is vaccinated from Covid.  Has no known exposures.    Patient transferred care to our [facility] from [facility] was her first appointment being 921.  Still of the time to continue with her labetalol to manage headaches with Tylenol and Fioricet.  But told to come into the ER if headache was unremitting Which is why she came in this morning.    Contractions: No   Loss of fluid: No   Vaginal bleeding: No   Fetal movement: presentt      PNC with [facility] Transfer  to [facility]    PNL:WNL  Lab Results   Component Value Date/Time    SODIUM 135 2021 06:10 PM    POTASSIUM 3.8 2021 06:10 PM    CHLORIDE 101 2021 06:10 PM    CO2 18 (L) 2021 06:10 PM    GLUCOSE 105 (H) 2021 06:10 PM    BUN 10 2021 06:10 PM    CREATININE 0.57 2021 06:10 PM      Lab Results   Component Value Date/Time    WBC 7.8 2021 06:10 PM    RBC 4.27 2021 06:10 PM    HEMOGLOBIN 10.6 (L) 2021 06:10 PM    HEMATOCRIT 33.1 (L) 2021 06:10 PM    MCV 77.5 (L) 2021 06:10 PM    MCH 24.8 (L) 2021 06:10 PM    MCHC 32.0 (L) 2021 06:10 PM    MPV 9.8 2021 06:10 PM    NEUTSPOLYS 73.10 (H) 2021 06:10 PM    LYMPHOCYTES 18.60 (L) 2021 06:10 PM    MONOCYTES 6.40 2021 06:10 PM    EOSINOPHILS 0.80 2021 06:10 PM    BASOPHILS 0.30 2021 06:10 PM       Rh+/-, R unknown, HIV neg, TrepAb neg, H, GC/CT neg/neg  Glucola: unknown  GBS unknown      ROS:  Const: denies fevers, general concerns   CV/resp: denies chest pain/ palpitations. Reports some SOB with ambulation. And some dizziness on standing.  GI: denies abd pain, GI concerns  : see HPI  Neuro: reports migraine but denies vision changes.    OB History    Para Term  AB Living   2 1 1     1   SAB TAB Ectopic Molar Multiple Live Births           0 1      # Outcome Date GA Lbr Emanuel/2nd Weight Sex Delivery Anes PTL Lv   2 Current            1 Term 10/25/18 37w1d 01:45 / 00:59 2.57 kg (5 lb 10.7 oz) F Vag-Spont EPI N LUISA       GYN: denies STIs, no cervical procedures.    Past Medical History:   Diagnosis Date   • Hypertension        No past surgical history on file.    No current facility-administered medications on file prior to encounter.     Current Outpatient Medications on File Prior to Encounter   Medication Sig Dispense Refill   • labetalol (NORMODYNE) 200 MG Tab Take 1 Tablet by mouth 2 times a day. 180 Tablet 0   • butalbital/apap/caffeine -40 mg (FIORICET) -40 MG Tab Take 1 Tablet by mouth every four hours as needed for Headache (Severe Headache if Tylenol insufficient) for up to 30 days. 30 Tablet 0   • norgestimate-ethinyl estradiol (ORTHO-CYCLEN) 0.25-35 MG-MCG per tablet Take 1 Tab by mouth every day. (Patient not taking: Reported on 2021) 28 Tab 1       No family history on file.    Social History     Tobacco Use   • Smoking status: Never Smoker   • Smokeless tobacco: Never Used   Vaping Use   • Vaping Use: Never used   Substance Use Topics   • Alcohol use: No   • Drug use: No         PE:  There were no vitals filed for this visit.  gen: AAO, in pain. Resting in darkened room.   CV: Regular rate and rhythm S1-S2 distal pulses 2+  Pulm: Clear to auscultation bilaterally no wheezing or rales  abd: soft, gravid, NT,  Ext: NT, no edema          A/P: 26 y.o.  @ 32w1d  by lmp presenting to ED for headache.  - CBC CMP   - 1 L bolus of LR  - 2 mg morphine IM  - We will monitor patient for resolution of headache consider additional measures if uncontrolled after initial treatment  - FHT reviewed category 1.  No signs of contractions.    1600: Pt refused Morphine  - Will try 1g Tylenol, 50mg Sumitriptan    Robles Sutherland M.D.

## 2021-09-24 ENCOUNTER — APPOINTMENT (OUTPATIENT)
Dept: RADIOLOGY | Facility: MEDICAL CENTER | Age: 26
End: 2021-09-24
Attending: STUDENT IN AN ORGANIZED HEALTH CARE EDUCATION/TRAINING PROGRAM
Payer: MEDICAID

## 2021-09-24 ENCOUNTER — APPOINTMENT (OUTPATIENT)
Dept: RADIOLOGY | Facility: MEDICAL CENTER | Age: 26
End: 2021-09-24
Attending: OBSTETRICS & GYNECOLOGY
Payer: MEDICAID

## 2021-09-24 LAB
ABO GROUP BLD: NORMAL
ALBUMIN SERPL BCP-MCNC: 3.3 G/DL (ref 3.2–4.9)
ALBUMIN SERPL BCP-MCNC: 3.5 G/DL (ref 3.2–4.9)
ALBUMIN SERPL BCP-MCNC: 4.1 G/DL (ref 3.2–4.9)
ALBUMIN/GLOB SERPL: 0.9 G/DL
ALBUMIN/GLOB SERPL: 1.1 G/DL
ALBUMIN/GLOB SERPL: 1.3 G/DL
ALP SERPL-CCNC: 120 U/L (ref 30–99)
ALP SERPL-CCNC: 125 U/L (ref 30–99)
ALP SERPL-CCNC: 129 U/L (ref 30–99)
ALT SERPL-CCNC: 102 U/L (ref 2–50)
ALT SERPL-CCNC: 114 U/L (ref 2–50)
ALT SERPL-CCNC: 119 U/L (ref 2–50)
ANION GAP SERPL CALC-SCNC: 10 MMOL/L (ref 7–16)
ANION GAP SERPL CALC-SCNC: 14 MMOL/L (ref 7–16)
ANION GAP SERPL CALC-SCNC: 15 MMOL/L (ref 7–16)
AST SERPL-CCNC: 64 U/L (ref 12–45)
AST SERPL-CCNC: 74 U/L (ref 12–45)
AST SERPL-CCNC: 80 U/L (ref 12–45)
BILIRUB SERPL-MCNC: 0.2 MG/DL (ref 0.1–1.5)
BILIRUB SERPL-MCNC: 0.3 MG/DL (ref 0.1–1.5)
BILIRUB SERPL-MCNC: 0.3 MG/DL (ref 0.1–1.5)
BUN SERPL-MCNC: 4 MG/DL (ref 8–22)
BUN SERPL-MCNC: 5 MG/DL (ref 8–22)
BUN SERPL-MCNC: 6 MG/DL (ref 8–22)
CALCIUM SERPL-MCNC: 7.8 MG/DL (ref 8.5–10.5)
CALCIUM SERPL-MCNC: 8.7 MG/DL (ref 8.5–10.5)
CALCIUM SERPL-MCNC: 9.1 MG/DL (ref 8.5–10.5)
CHLORIDE SERPL-SCNC: 103 MMOL/L (ref 96–112)
CHLORIDE SERPL-SCNC: 105 MMOL/L (ref 96–112)
CHLORIDE SERPL-SCNC: 106 MMOL/L (ref 96–112)
CO2 SERPL-SCNC: 17 MMOL/L (ref 20–33)
CO2 SERPL-SCNC: 17 MMOL/L (ref 20–33)
CO2 SERPL-SCNC: 20 MMOL/L (ref 20–33)
CREAT SERPL-MCNC: 0.5 MG/DL (ref 0.5–1.4)
CREAT SERPL-MCNC: 0.54 MG/DL (ref 0.5–1.4)
CREAT SERPL-MCNC: 0.56 MG/DL (ref 0.5–1.4)
ERYTHROCYTE [DISTWIDTH] IN BLOOD BY AUTOMATED COUNT: 37.9 FL (ref 35.9–50)
ERYTHROCYTE [DISTWIDTH] IN BLOOD BY AUTOMATED COUNT: 38.7 FL (ref 35.9–50)
ERYTHROCYTE [DISTWIDTH] IN BLOOD BY AUTOMATED COUNT: 39.6 FL (ref 35.9–50)
GLOBULIN SER CALC-MCNC: 3.1 G/DL (ref 1.9–3.5)
GLOBULIN SER CALC-MCNC: 3.2 G/DL (ref 1.9–3.5)
GLOBULIN SER CALC-MCNC: 3.8 G/DL (ref 1.9–3.5)
GLUCOSE SERPL-MCNC: 111 MG/DL (ref 65–99)
GLUCOSE SERPL-MCNC: 118 MG/DL (ref 65–99)
GLUCOSE SERPL-MCNC: 141 MG/DL (ref 65–99)
HAV IGM SERPL QL IA: NORMAL
HBV CORE IGM SER QL: NORMAL
HBV SURFACE AG SER QL: NORMAL
HCT VFR BLD AUTO: 31.3 % (ref 37–47)
HCT VFR BLD AUTO: 32.8 % (ref 37–47)
HCT VFR BLD AUTO: 33.1 % (ref 37–47)
HCV AB SER QL: NORMAL
HGB BLD-MCNC: 10.2 G/DL (ref 12–16)
HGB BLD-MCNC: 10.6 G/DL (ref 12–16)
HGB BLD-MCNC: 10.7 G/DL (ref 12–16)
LDH SERPL L TO P-CCNC: 279 U/L (ref 107–266)
MAGNESIUM SERPL-MCNC: 2 MG/DL (ref 1.5–2.5)
MAGNESIUM SERPL-MCNC: 4.3 MG/DL (ref 1.5–2.5)
MAGNESIUM SERPL-MCNC: 5.4 MG/DL (ref 1.5–2.5)
MAGNESIUM SERPL-MCNC: 5.5 MG/DL (ref 1.5–2.5)
MCH RBC QN AUTO: 24.4 PG (ref 27–33)
MCH RBC QN AUTO: 24.6 PG (ref 27–33)
MCH RBC QN AUTO: 25.2 PG (ref 27–33)
MCHC RBC AUTO-ENTMCNC: 32 G/DL (ref 33.6–35)
MCHC RBC AUTO-ENTMCNC: 32.6 G/DL (ref 33.6–35)
MCHC RBC AUTO-ENTMCNC: 32.6 G/DL (ref 33.6–35)
MCV RBC AUTO: 75.4 FL (ref 81.4–97.8)
MCV RBC AUTO: 76.1 FL (ref 81.4–97.8)
MCV RBC AUTO: 77.3 FL (ref 81.4–97.8)
PLATELET # BLD AUTO: 378 K/UL (ref 164–446)
PLATELET # BLD AUTO: 421 K/UL (ref 164–446)
PLATELET # BLD AUTO: 421 K/UL (ref 164–446)
PMV BLD AUTO: 9.7 FL (ref 9–12.9)
PMV BLD AUTO: 9.8 FL (ref 9–12.9)
PMV BLD AUTO: 9.8 FL (ref 9–12.9)
POTASSIUM SERPL-SCNC: 4 MMOL/L (ref 3.6–5.5)
POTASSIUM SERPL-SCNC: 4.2 MMOL/L (ref 3.6–5.5)
POTASSIUM SERPL-SCNC: 4.3 MMOL/L (ref 3.6–5.5)
PROT SERPL-MCNC: 6.7 G/DL (ref 6–8.2)
PROT SERPL-MCNC: 7.1 G/DL (ref 6–8.2)
PROT SERPL-MCNC: 7.2 G/DL (ref 6–8.2)
RBC # BLD AUTO: 4.05 M/UL (ref 4.2–5.4)
RBC # BLD AUTO: 4.35 M/UL (ref 4.2–5.4)
RBC # BLD AUTO: 4.35 M/UL (ref 4.2–5.4)
RH BLD: NORMAL
RUBV AB SER QL: 161 IU/ML
SARS-COV-2 RNA RESP QL NAA+PROBE: NOTDETECTED
SODIUM SERPL-SCNC: 135 MMOL/L (ref 135–145)
SODIUM SERPL-SCNC: 135 MMOL/L (ref 135–145)
SODIUM SERPL-SCNC: 137 MMOL/L (ref 135–145)
SPECIMEN SOURCE: NORMAL
URATE SERPL-MCNC: 3.1 MG/DL (ref 1.9–8.2)
WBC # BLD AUTO: 10.5 K/UL (ref 4.8–10.8)
WBC # BLD AUTO: 10.7 K/UL (ref 4.8–10.8)
WBC # BLD AUTO: 9.6 K/UL (ref 4.8–10.8)

## 2021-09-24 PROCEDURE — 87150 DNA/RNA AMPLIFIED PROBE: CPT

## 2021-09-24 PROCEDURE — 36415 COLL VENOUS BLD VENIPUNCTURE: CPT | Mod: XU

## 2021-09-24 PROCEDURE — 85027 COMPLETE CBC AUTOMATED: CPT

## 2021-09-24 PROCEDURE — 87081 CULTURE SCREEN ONLY: CPT

## 2021-09-24 PROCEDURE — 84550 ASSAY OF BLOOD/URIC ACID: CPT

## 2021-09-24 PROCEDURE — 83735 ASSAY OF MAGNESIUM: CPT

## 2021-09-24 PROCEDURE — 700102 HCHG RX REV CODE 250 W/ 637 OVERRIDE(OP): Performed by: OBSTETRICS & GYNECOLOGY

## 2021-09-24 PROCEDURE — 36415 COLL VENOUS BLD VENIPUNCTURE: CPT

## 2021-09-24 PROCEDURE — 700111 HCHG RX REV CODE 636 W/ 250 OVERRIDE (IP): Performed by: OBSTETRICS & GYNECOLOGY

## 2021-09-24 PROCEDURE — 96365 THER/PROPH/DIAG IV INF INIT: CPT

## 2021-09-24 PROCEDURE — G0378 HOSPITAL OBSERVATION PER HR: HCPCS

## 2021-09-24 PROCEDURE — 96375 TX/PRO/DX INJ NEW DRUG ADDON: CPT

## 2021-09-24 PROCEDURE — 76705 ECHO EXAM OF ABDOMEN: CPT

## 2021-09-24 PROCEDURE — 80074 ACUTE HEPATITIS PANEL: CPT

## 2021-09-24 PROCEDURE — 700102 HCHG RX REV CODE 250 W/ 637 OVERRIDE(OP): Performed by: PHYSICIAN ASSISTANT

## 2021-09-24 PROCEDURE — 76816 OB US FOLLOW-UP PER FETUS: CPT

## 2021-09-24 PROCEDURE — 700105 HCHG RX REV CODE 258: Performed by: OBSTETRICS & GYNECOLOGY

## 2021-09-24 PROCEDURE — 80053 COMPREHEN METABOLIC PANEL: CPT

## 2021-09-24 PROCEDURE — 96372 THER/PROPH/DIAG INJ SC/IM: CPT | Mod: XU

## 2021-09-24 PROCEDURE — U0003 INFECTIOUS AGENT DETECTION BY NUCLEIC ACID (DNA OR RNA); SEVERE ACUTE RESPIRATORY SYNDROME CORONAVIRUS 2 (SARS-COV-2) (CORONAVIRUS DISEASE [COVID-19]), AMPLIFIED PROBE TECHNIQUE, MAKING USE OF HIGH THROUGHPUT TECHNOLOGIES AS DESCRIBED BY CMS-2020-01-R: HCPCS

## 2021-09-24 PROCEDURE — 83615 LACTATE (LD) (LDH) ENZYME: CPT

## 2021-09-24 PROCEDURE — 76819 FETAL BIOPHYS PROFIL W/O NST: CPT

## 2021-09-24 PROCEDURE — A9270 NON-COVERED ITEM OR SERVICE: HCPCS | Performed by: OBSTETRICS & GYNECOLOGY

## 2021-09-24 PROCEDURE — 302790 HCHG STAT ANTEPARTUM CARE, DAILY

## 2021-09-24 PROCEDURE — 86762 RUBELLA ANTIBODY: CPT

## 2021-09-24 PROCEDURE — A9270 NON-COVERED ITEM OR SERVICE: HCPCS | Performed by: PHYSICIAN ASSISTANT

## 2021-09-24 PROCEDURE — 86900 BLOOD TYPING SEROLOGIC ABO: CPT

## 2021-09-24 PROCEDURE — 86901 BLOOD TYPING SEROLOGIC RH(D): CPT

## 2021-09-24 PROCEDURE — U0005 INFEC AGEN DETEC AMPLI PROBE: HCPCS

## 2021-09-24 RX ORDER — MAGNESIUM SULFATE HEPTAHYDRATE 40 MG/ML
4 INJECTION, SOLUTION INTRAVENOUS ONCE
Status: COMPLETED | OUTPATIENT
Start: 2021-09-24 | End: 2021-09-24

## 2021-09-24 RX ORDER — MAGNESIUM SULFATE HEPTAHYDRATE 40 MG/ML
1 INJECTION, SOLUTION INTRAVENOUS CONTINUOUS
Status: DISCONTINUED | OUTPATIENT
Start: 2021-09-24 | End: 2021-09-26 | Stop reason: HOSPADM

## 2021-09-24 RX ORDER — CALCIUM GLUCONATE 94 MG/ML
1 INJECTION, SOLUTION INTRAVENOUS
Status: DISCONTINUED | OUTPATIENT
Start: 2021-09-24 | End: 2021-09-25

## 2021-09-24 RX ORDER — BUTALBITAL, ACETAMINOPHEN AND CAFFEINE 50; 325; 40 MG/1; MG/1; MG/1
1 TABLET ORAL EVERY 6 HOURS PRN
Status: DISCONTINUED | OUTPATIENT
Start: 2021-09-24 | End: 2021-09-26 | Stop reason: HOSPADM

## 2021-09-24 RX ORDER — ACETAMINOPHEN 325 MG/1
650 TABLET ORAL EVERY 4 HOURS PRN
Status: DISCONTINUED | OUTPATIENT
Start: 2021-09-24 | End: 2021-09-26 | Stop reason: HOSPADM

## 2021-09-24 RX ORDER — ONDANSETRON 2 MG/ML
4 INJECTION INTRAMUSCULAR; INTRAVENOUS EVERY 6 HOURS PRN
Status: DISCONTINUED | OUTPATIENT
Start: 2021-09-24 | End: 2021-09-26 | Stop reason: HOSPADM

## 2021-09-24 RX ORDER — SODIUM CHLORIDE, SODIUM LACTATE, POTASSIUM CHLORIDE, CALCIUM CHLORIDE 600; 310; 30; 20 MG/100ML; MG/100ML; MG/100ML; MG/100ML
INJECTION, SOLUTION INTRAVENOUS CONTINUOUS
Status: DISCONTINUED | OUTPATIENT
Start: 2021-09-24 | End: 2021-09-26 | Stop reason: HOSPADM

## 2021-09-24 RX ORDER — ALUMINA, MAGNESIA, AND SIMETHICONE 2400; 2400; 240 MG/30ML; MG/30ML; MG/30ML
10 SUSPENSION ORAL ONCE
Status: DISCONTINUED | OUTPATIENT
Start: 2021-09-24 | End: 2021-09-24

## 2021-09-24 RX ORDER — ALUMINA, MAGNESIA, AND SIMETHICONE 2400; 2400; 240 MG/30ML; MG/30ML; MG/30ML
30 SUSPENSION ORAL ONCE
Status: COMPLETED | OUTPATIENT
Start: 2021-09-24 | End: 2021-09-24

## 2021-09-24 RX ADMIN — BUTALBITAL, ACETAMINOPHEN, AND CAFFEINE 1 TABLET: 50; 325; 40 TABLET ORAL at 21:44

## 2021-09-24 RX ADMIN — SODIUM CHLORIDE, POTASSIUM CHLORIDE, SODIUM LACTATE AND CALCIUM CHLORIDE: 600; 310; 30; 20 INJECTION, SOLUTION INTRAVENOUS at 21:44

## 2021-09-24 RX ADMIN — MAGNESIUM SULFATE IN WATER 4 G: 40 INJECTION, SOLUTION INTRAVENOUS at 08:15

## 2021-09-24 RX ADMIN — SODIUM CHLORIDE, POTASSIUM CHLORIDE, SODIUM LACTATE AND CALCIUM CHLORIDE: 600; 310; 30; 20 INJECTION, SOLUTION INTRAVENOUS at 08:15

## 2021-09-24 RX ADMIN — ACETAMINOPHEN 650 MG: 325 TABLET, FILM COATED ORAL at 08:59

## 2021-09-24 RX ADMIN — MAGNESIUM SULFATE HEPTAHYDRATE 2 G/HR: 40 INJECTION, SOLUTION INTRAVENOUS at 08:35

## 2021-09-24 RX ADMIN — ONDANSETRON 4 MG: 2 INJECTION INTRAMUSCULAR; INTRAVENOUS at 21:07

## 2021-09-24 RX ADMIN — ACETAMINOPHEN 650 MG: 325 TABLET, FILM COATED ORAL at 14:52

## 2021-09-24 RX ADMIN — BETAMETHASONE SODIUM PHOSPHATE AND BETAMETHASONE ACETATE 12 MG: 3; 3 INJECTION, SUSPENSION INTRA-ARTICULAR; INTRALESIONAL; INTRAMUSCULAR at 23:05

## 2021-09-24 RX ADMIN — ALUMINUM HYDROXIDE, MAGNESIUM HYDROXIDE, AND DIMETHICONE 30 ML: 400; 400; 40 SUSPENSION ORAL at 04:24

## 2021-09-24 ASSESSMENT — PATIENT HEALTH QUESTIONNAIRE - PHQ9
2. FEELING DOWN, DEPRESSED, IRRITABLE, OR HOPELESS: NOT AT ALL
1. LITTLE INTEREST OR PLEASURE IN DOING THINGS: NOT AT ALL
1. LITTLE INTEREST OR PLEASURE IN DOING THINGS: NOT AT ALL
SUM OF ALL RESPONSES TO PHQ9 QUESTIONS 1 AND 2: 0
2. FEELING DOWN, DEPRESSED, IRRITABLE, OR HOPELESS: NOT AT ALL
SUM OF ALL RESPONSES TO PHQ9 QUESTIONS 1 AND 2: 0

## 2021-09-24 ASSESSMENT — PAIN DESCRIPTION - PAIN TYPE
TYPE: ACUTE PAIN

## 2021-09-24 ASSESSMENT — LIFESTYLE VARIABLES: ALCOHOL_USE: NO

## 2021-09-24 NOTE — PROGRESS NOTES
Report received from Evelyn RAMIREZ. Dr. Muniz at bedside. Order received to start magnesium and 24 hour urine.  Report given to Grace RAMIREZ

## 2021-09-24 NOTE — PROGRESS NOTES
2015 - Report received from JAMES San and pt transferred to room 30. Pt c/o very mild headache, improved from before. No c/o dizziness at this time. Pts IV wrapped and pt prepped for shower.  2050 - Pt out of shower. Bps stable, pt with no complaints at this time.   2239 - Betamethasone given as ordered. Pt tolerated well. Pt with no needs at this time.  0420 - Pt c/o acid reflux, maalox given.   0530 - Pt states acid reflux improved. Pt provided with gingerale.  0700 - Report given to JAMES Harding

## 2021-09-24 NOTE — PROGRESS NOTES
1318-Pt presents to labor and delivery with complaints of dizziness, one episode of low BP at home 90/40.  Applied EFM and toco monitors, vital signs done.  Repositioned to right side.  Fluids given.  Encouraged to drink.  Dr. Muniz and Dr. Sutherland called, report given, orders received.  Will continue to monitor status.  1430-Dr. Sutherland at bedside. Orders received.  1500-IV started and labs sent. LR bolus given.  1641-Medicated with Tylenol per MD order.  1741-Medicated with Imitrex per MD order.  1800-Dr. Muniz at bedside. Orders received for overnight observation for elevated ALT/AST and BPs.   1900-Report to TERRANCE RAMIREZ.

## 2021-09-24 NOTE — PROGRESS NOTES
KATHERYN from Lab called with critical result of [facility] at 1638. Critical lab result read back to KATHERYN.   This critical lab result is within parameters established by mag protocol for this patient

## 2021-09-24 NOTE — PROGRESS NOTES
Report received from Meaghan RAMIREZ, assumed care of pt.  1873-call received from [facility]. Pt needs to be NPO for 6-8 hours in order for the RUQ US to be done. Will be by at about 2000. Pt currently finishing her lunch. Updated providers.   1814- Dr. Sutherland in department. Reported that pt continues to have a headache and blurred vision. Reviewed labs. Will discuss with Dr. Joyner shortly.   1915- report to Susan RAMIREZ.

## 2021-09-25 LAB
ALBUMIN SERPL BCP-MCNC: 3.3 G/DL (ref 3.2–4.9)
ALBUMIN SERPL BCP-MCNC: 3.7 G/DL (ref 3.2–4.9)
ALBUMIN/GLOB SERPL: 1.1 G/DL
ALBUMIN/GLOB SERPL: 1.1 G/DL
ALP SERPL-CCNC: 115 U/L (ref 30–99)
ALP SERPL-CCNC: 124 U/L (ref 30–99)
ALT SERPL-CCNC: 184 U/L (ref 2–50)
ALT SERPL-CCNC: 225 U/L (ref 2–50)
AMYLASE SERPL-CCNC: 40 U/L (ref 20–103)
ANION GAP SERPL CALC-SCNC: 12 MMOL/L (ref 7–16)
ANION GAP SERPL CALC-SCNC: 14 MMOL/L (ref 7–16)
AST SERPL-CCNC: 134 U/L (ref 12–45)
AST SERPL-CCNC: 149 U/L (ref 12–45)
BILIRUB SERPL-MCNC: 0.2 MG/DL (ref 0.1–1.5)
BILIRUB SERPL-MCNC: 0.2 MG/DL (ref 0.1–1.5)
BUN SERPL-MCNC: 5 MG/DL (ref 8–22)
BUN SERPL-MCNC: 6 MG/DL (ref 8–22)
CALCIUM SERPL-MCNC: 7.1 MG/DL (ref 8.5–10.5)
CALCIUM SERPL-MCNC: 7.8 MG/DL (ref 8.5–10.5)
CHLORIDE SERPL-SCNC: 102 MMOL/L (ref 96–112)
CHLORIDE SERPL-SCNC: 105 MMOL/L (ref 96–112)
CO2 SERPL-SCNC: 19 MMOL/L (ref 20–33)
CO2 SERPL-SCNC: 20 MMOL/L (ref 20–33)
CREAT SERPL-MCNC: 0.66 MG/DL (ref 0.5–1.4)
CREAT SERPL-MCNC: 0.66 MG/DL (ref 0.5–1.4)
ERYTHROCYTE [DISTWIDTH] IN BLOOD BY AUTOMATED COUNT: 39.2 FL (ref 35.9–50)
ERYTHROCYTE [DISTWIDTH] IN BLOOD BY AUTOMATED COUNT: 39.3 FL (ref 35.9–50)
GLOBULIN SER CALC-MCNC: 3.1 G/DL (ref 1.9–3.5)
GLOBULIN SER CALC-MCNC: 3.4 G/DL (ref 1.9–3.5)
GLUCOSE SERPL-MCNC: 154 MG/DL (ref 65–99)
GLUCOSE SERPL-MCNC: 155 MG/DL (ref 65–99)
GP B STREP DNA SPEC QL NAA+PROBE: NEGATIVE
HCT VFR BLD AUTO: 29.7 % (ref 37–47)
HCT VFR BLD AUTO: 31.8 % (ref 37–47)
HGB BLD-MCNC: 10.5 G/DL (ref 12–16)
HGB BLD-MCNC: 9.7 G/DL (ref 12–16)
LIPASE SERPL-CCNC: 25 U/L (ref 11–82)
MAGNESIUM SERPL-MCNC: 4.6 MG/DL (ref 1.5–2.5)
MCH RBC QN AUTO: 25.2 PG (ref 27–33)
MCH RBC QN AUTO: 25.2 PG (ref 27–33)
MCHC RBC AUTO-ENTMCNC: 32.7 G/DL (ref 33.6–35)
MCHC RBC AUTO-ENTMCNC: 33 G/DL (ref 33.6–35)
MCV RBC AUTO: 76.4 FL (ref 81.4–97.8)
MCV RBC AUTO: 77.1 FL (ref 81.4–97.8)
PLATELET # BLD AUTO: 388 K/UL (ref 164–446)
PLATELET # BLD AUTO: 409 K/UL (ref 164–446)
PMV BLD AUTO: 10.1 FL (ref 9–12.9)
PMV BLD AUTO: 9.8 FL (ref 9–12.9)
POTASSIUM SERPL-SCNC: 3.9 MMOL/L (ref 3.6–5.5)
POTASSIUM SERPL-SCNC: 4.3 MMOL/L (ref 3.6–5.5)
PROT 24H UR-MCNC: 120 MG/24 HR (ref 30–150)
PROT 24H UR-MRATE: 4 MG/DL (ref 0–15)
PROT SERPL-MCNC: 6.4 G/DL (ref 6–8.2)
PROT SERPL-MCNC: 7.1 G/DL (ref 6–8.2)
RBC # BLD AUTO: 3.85 M/UL (ref 4.2–5.4)
RBC # BLD AUTO: 4.16 M/UL (ref 4.2–5.4)
SODIUM SERPL-SCNC: 134 MMOL/L (ref 135–145)
SODIUM SERPL-SCNC: 138 MMOL/L (ref 135–145)
SPECIMEN VOL UR: NORMAL ML
WBC # BLD AUTO: 10.8 K/UL (ref 4.8–10.8)
WBC # BLD AUTO: 10.9 K/UL (ref 4.8–10.8)

## 2021-09-25 PROCEDURE — G0378 HOSPITAL OBSERVATION PER HR: HCPCS

## 2021-09-25 PROCEDURE — 84156 ASSAY OF PROTEIN URINE: CPT

## 2021-09-25 PROCEDURE — 700102 HCHG RX REV CODE 250 W/ 637 OVERRIDE(OP): Performed by: OBSTETRICS & GYNECOLOGY

## 2021-09-25 PROCEDURE — 36415 COLL VENOUS BLD VENIPUNCTURE: CPT

## 2021-09-25 PROCEDURE — 59025 FETAL NON-STRESS TEST: CPT | Mod: 26 | Performed by: OBSTETRICS & GYNECOLOGY

## 2021-09-25 PROCEDURE — 80053 COMPREHEN METABOLIC PANEL: CPT

## 2021-09-25 PROCEDURE — 83690 ASSAY OF LIPASE: CPT

## 2021-09-25 PROCEDURE — 83735 ASSAY OF MAGNESIUM: CPT

## 2021-09-25 PROCEDURE — 81050 URINALYSIS VOLUME MEASURE: CPT

## 2021-09-25 PROCEDURE — A9270 NON-COVERED ITEM OR SERVICE: HCPCS | Performed by: OBSTETRICS & GYNECOLOGY

## 2021-09-25 PROCEDURE — 700105 HCHG RX REV CODE 258: Performed by: OBSTETRICS & GYNECOLOGY

## 2021-09-25 PROCEDURE — 85027 COMPLETE CBC AUTOMATED: CPT | Mod: 91

## 2021-09-25 PROCEDURE — 302790 HCHG STAT ANTEPARTUM CARE, DAILY

## 2021-09-25 PROCEDURE — 700111 HCHG RX REV CODE 636 W/ 250 OVERRIDE (IP): Performed by: OBSTETRICS & GYNECOLOGY

## 2021-09-25 PROCEDURE — 82150 ASSAY OF AMYLASE: CPT

## 2021-09-25 PROCEDURE — 99225 PR SUBSEQUENT OBSERVATION CARE,LEVEL II: CPT | Mod: 25 | Performed by: OBSTETRICS & GYNECOLOGY

## 2021-09-25 RX ORDER — LABETALOL 100 MG/1
200 TABLET, FILM COATED ORAL TWICE DAILY
Status: DISCONTINUED | OUTPATIENT
Start: 2021-09-25 | End: 2021-09-26 | Stop reason: HOSPADM

## 2021-09-25 RX ADMIN — BUTALBITAL, ACETAMINOPHEN, AND CAFFEINE 1 TABLET: 50; 325; 40 TABLET ORAL at 08:11

## 2021-09-25 RX ADMIN — SODIUM CHLORIDE, POTASSIUM CHLORIDE, SODIUM LACTATE AND CALCIUM CHLORIDE: 600; 310; 30; 20 INJECTION, SOLUTION INTRAVENOUS at 08:22

## 2021-09-25 RX ADMIN — BUTALBITAL, ACETAMINOPHEN, AND CAFFEINE 1 TABLET: 50; 325; 40 TABLET ORAL at 20:13

## 2021-09-25 RX ADMIN — LABETALOL HYDROCHLORIDE 200 MG: 100 TABLET, FILM COATED ORAL at 22:04

## 2021-09-25 RX ADMIN — MAGNESIUM SULFATE HEPTAHYDRATE 1 G/HR: 40 INJECTION, SOLUTION INTRAVENOUS at 08:22

## 2021-09-25 RX ADMIN — LABETALOL HYDROCHLORIDE 200 MG: 100 TABLET, FILM COATED ORAL at 11:15

## 2021-09-25 ASSESSMENT — PATIENT HEALTH QUESTIONNAIRE - PHQ9
1. LITTLE INTEREST OR PLEASURE IN DOING THINGS: NOT AT ALL
SUM OF ALL RESPONSES TO PHQ9 QUESTIONS 1 AND 2: 0
1. LITTLE INTEREST OR PLEASURE IN DOING THINGS: NOT AT ALL
SUM OF ALL RESPONSES TO PHQ9 QUESTIONS 1 AND 2: 0
2. FEELING DOWN, DEPRESSED, IRRITABLE, OR HOPELESS: NOT AT ALL
2. FEELING DOWN, DEPRESSED, IRRITABLE, OR HOPELESS: NOT AT ALL

## 2021-09-25 NOTE — PROGRESS NOTES
Report received from Susan RAMIREZ. Pt reports +FM, h/a, and lower back pain. Denies LOF/VB/UCs. Dr. Obando at bedside to assess patient and answer questions. Order received to d/c the magnesium. Dr. Martin to assess patient later today.     1900-report to Frank RAMIREZ

## 2021-09-25 NOTE — PROGRESS NOTES
Labor Progress Note    Leila Navarro       IUP@32w3d  Gestational hypertension  A/W RUQ pain and headaches  Elevated LFTs  Gallstones by US      Subjective:  Patient is resting comfortably in bed.  She is on the phone with her mom and her mom had multiple questions regarding her daughter's care.I addressed.  I had reviewed labs with Dr. Martin the perinatologist who recommends stopping magnesium sulfate and he suspected patient has gallstone/gallbladder disorder rather than preeclampsia since LFTs are the only labs that are abnormal.  Patient denies any headaches at present.  She only has right upper quadrant soreness on palpation.  She reports normal fetal movements without bleeding or leaking.  Denies headaches or scotoma at present.  Patient received 2 doses of betamethasone and magnesium is now discontinued.  I spoke to Dr. Martin who will come in today for an official consultation    Objective:   Vitals:    09/25/21 0745 09/25/21 0815 09/25/21 0820 09/25/21 0825   BP: 119/59 137/81     Pulse: (!) 113 (!) 105 (!) 104 (!) 107   Resp:       Temp:  36.7 °C (98 °F)     TempSrc:  Temporal     SpO2:   97% 97%   Weight:       Height:             Physical exam:   Patient is awake alert oriented x3 no distress  Neck is supple  Chest is clear to auscultation  Cardiovascular: Mildly tachycardic, regular rhythm    abdomen is soft gravid and nontender  Extremity: No clubbing cyanosis or edema No calf pain  skin: No rashes or lesions.  Neurologic: No gross deficits  Psych: Appropriate mood and affect  Pelvic: Deferred      NST per my read:  Indications: Hypertension complicating pregnancy /gallstones elevated liver function testing/    FHR baseline: 120s  Accelerations present without decelerations  Moderate FHR variability present  [place]: no contractions noted  NST is reactive      Meds:     Current Facility-Administered Medications:   •  lactated ringers infusion, , Intravenous, Continuous, Carroll Muniz M.D., Stopped at  09/25/21 0903  •  ondansetron (ZOFRAN) syringe/vial injection 4 mg, 4 mg, Intravenous, Q6HRS PRN, Carroll Muniz M.D., 4 mg at 09/24/21 2107  •  magnesium sulfate 40 g/1000mL infusion, 1 g/hr, Intravenous, Continuous, Haley Joyner D.O., Stopped at 09/25/21 0903  •  acetaminophen (Tylenol) tablet 650 mg, 650 mg, Oral, Q4HRS PRN, ANN MARIE Harris.OTraci, 650 mg at 09/24/21 1452  •  butalbital/apap/caffeine -40 mg (Fioricet) per tablet 1 Tablet, 1 Tablet, Oral, Q6HRS PRN, Haley Joyner D.O., 1 Tablet at 09/25/21 0811    Labs:  Recent Results (from the past 24 hour(s))   CBC WITHOUT DIFFERENTIAL    Collection Time: 09/24/21 10:40 AM   Result Value Ref Range    WBC 10.5 4.8 - 10.8 K/uL    RBC 4.35 4.20 - 5.40 M/uL    Hemoglobin 10.7 (L) 12.0 - 16.0 g/dL    Hematocrit 32.8 (L) 37.0 - 47.0 %    MCV 75.4 (L) 81.4 - 97.8 fL    MCH 24.6 (L) 27.0 - 33.0 pg    MCHC 32.6 (L) 33.6 - 35.0 g/dL    RDW 37.9 35.9 - 50.0 fL    Platelet Count 421 164 - 446 K/uL    MPV 9.7 9.0 - 12.9 fL   Comp Metabolic Panel    Collection Time: 09/24/21 10:40 AM   Result Value Ref Range    Sodium 135 135 - 145 mmol/L    Potassium 4.2 3.6 - 5.5 mmol/L    Chloride 103 96 - 112 mmol/L    Co2 17 (L) 20 - 33 mmol/L    Anion Gap 15.0 7.0 - 16.0    Glucose 141 (H) 65 - 99 mg/dL    Bun 5 (L) 8 - 22 mg/dL    Creatinine 0.50 0.50 - 1.40 mg/dL    Calcium 8.7 8.5 - 10.5 mg/dL    AST(SGOT) 74 (H) 12 - 45 U/L    ALT(SGPT) 114 (H) 2 - 50 U/L    Alkaline Phosphatase 129 (H) 30 - 99 U/L    Total Bilirubin 0.3 0.1 - 1.5 mg/dL    Albumin 4.1 3.2 - 4.9 g/dL    Total Protein 7.2 6.0 - 8.2 g/dL    Globulin 3.1 1.9 - 3.5 g/dL    A-G Ratio 1.3 g/dL   MAGNESIUM    Collection Time: 09/24/21 10:40 AM   Result Value Ref Range    Magnesium 4.3 (H) 1.5 - 2.5 mg/dL   ESTIMATED GFR    Collection Time: 09/24/21 10:40 AM   Result Value Ref Range    GFR If African American >60 >60 mL/min/1.73 m 2    GFR If Non African American >60 >60 mL/min/1.73 m 2   CBC WITHOUT  DIFFERENTIAL    Collection Time: 09/24/21  3:50 PM   Result Value Ref Range    WBC 10.7 4.8 - 10.8 K/uL    RBC 4.05 (L) 4.20 - 5.40 M/uL    Hemoglobin 10.2 (L) 12.0 - 16.0 g/dL    Hematocrit 31.3 (L) 37.0 - 47.0 %    MCV 77.3 (L) 81.4 - 97.8 fL    MCH 25.2 (L) 27.0 - 33.0 pg    MCHC 32.6 (L) 33.6 - 35.0 g/dL    RDW 39.6 35.9 - 50.0 fL    Platelet Count 421 164 - 446 K/uL    MPV 9.8 9.0 - 12.9 fL   Comp Metabolic Panel    Collection Time: 09/24/21  3:50 PM   Result Value Ref Range    Sodium 135 135 - 145 mmol/L    Potassium 4.0 3.6 - 5.5 mmol/L    Chloride 105 96 - 112 mmol/L    Co2 20 20 - 33 mmol/L    Anion Gap 10.0 7.0 - 16.0    Glucose 118 (H) 65 - 99 mg/dL    Bun 4 (L) 8 - 22 mg/dL    Creatinine 0.56 0.50 - 1.40 mg/dL    Calcium 7.8 (L) 8.5 - 10.5 mg/dL    AST(SGOT) 80 (H) 12 - 45 U/L    ALT(SGPT) 119 (H) 2 - 50 U/L    Alkaline Phosphatase 120 (H) 30 - 99 U/L    Total Bilirubin 0.2 0.1 - 1.5 mg/dL    Albumin 3.5 3.2 - 4.9 g/dL    Total Protein 6.7 6.0 - 8.2 g/dL    Globulin 3.2 1.9 - 3.5 g/dL    A-G Ratio 1.1 g/dL   MAGNESIUM    Collection Time: 09/24/21  3:50 PM   Result Value Ref Range    Magnesium 5.4 (HH) 1.5 - 2.5 mg/dL   ESTIMATED GFR    Collection Time: 09/24/21  3:50 PM   Result Value Ref Range    GFR If African American >60 >60 mL/min/1.73 m 2    GFR If Non African American >60 >60 mL/min/1.73 m 2   URIC ACID    Collection Time: 09/24/21 10:02 PM   Result Value Ref Range    Uric Acid 3.1 1.9 - 8.2 mg/dL   LDH    Collection Time: 09/24/21 10:02 PM   Result Value Ref Range    LDH Total 279 (H) 107 - 266 U/L   MAGNESIUM    Collection Time: 09/24/21 10:02 PM   Result Value Ref Range    Magnesium 5.5 (HH) 1.5 - 2.5 mg/dL   CBC WITHOUT DIFFERENTIAL    Collection Time: 09/25/21  4:09 AM   Result Value Ref Range    WBC 10.9 (H) 4.8 - 10.8 K/uL    RBC 3.85 (L) 4.20 - 5.40 M/uL    Hemoglobin 9.7 (L) 12.0 - 16.0 g/dL    Hematocrit 29.7 (L) 37.0 - 47.0 %    MCV 77.1 (L) 81.4 - 97.8 fL    MCH 25.2 (L) 27.0 - 33.0 pg     MCHC 32.7 (L) 33.6 - 35.0 g/dL    RDW 39.2 35.9 - 50.0 fL    Platelet Count 388 164 - 446 K/uL    MPV 10.1 9.0 - 12.9 fL   Comp Metabolic Panel    Collection Time: 09/25/21  4:09 AM   Result Value Ref Range    Sodium 134 (L) 135 - 145 mmol/L    Potassium 4.3 3.6 - 5.5 mmol/L    Chloride 102 96 - 112 mmol/L    Co2 20 20 - 33 mmol/L    Anion Gap 12.0 7.0 - 16.0    Glucose 154 (H) 65 - 99 mg/dL    Bun 5 (L) 8 - 22 mg/dL    Creatinine 0.66 0.50 - 1.40 mg/dL    Calcium 7.1 (L) 8.5 - 10.5 mg/dL    AST(SGOT) 134 (H) 12 - 45 U/L    ALT(SGPT) 184 (H) 2 - 50 U/L    Alkaline Phosphatase 115 (H) 30 - 99 U/L    Total Bilirubin 0.2 0.1 - 1.5 mg/dL    Albumin 3.3 3.2 - 4.9 g/dL    Total Protein 6.4 6.0 - 8.2 g/dL    Globulin 3.1 1.9 - 3.5 g/dL    A-G Ratio 1.1 g/dL   MAGNESIUM    Collection Time: 09/25/21  4:09 AM   Result Value Ref Range    Magnesium 4.6 (H) 1.5 - 2.5 mg/dL   ESTIMATED GFR    Collection Time: 09/25/21  4:09 AM   Result Value Ref Range    GFR If African American >60 >60 mL/min/1.73 m 2    GFR If Non African American >60 >60 mL/min/1.73 m 2   AMYLASE    Collection Time: 09/25/21  4:09 AM   Result Value Ref Range    Amylase 40 20 - 103 U/L   LIPASE    Collection Time: 09/25/21  4:09 AM   Result Value Ref Range    Lipase 25 11 - 82 U/L   URINETOTAL PROTEIN 24 HR    Collection Time: 09/25/21  8:30 AM   Result Value Ref Range    Total Volume, Urine 3000ml mL       Assessment:   Intrauterine pregnancy at 32w3d  Labor State: Antepartum  Gestational hypertension  A/W RUQ pain and headaches  Elevated LFTs  Gallstones by US  NST reactive  Patient had suspected preeclampsia based on symptoms but perinatologist states that patient likely has gallbladder disease and to stop magnesium sulfate.  Magnesium was discontinued.  Patient is status post betamethasone x2 doses  Mild anemia  .  Plan:  I reviewed findings working diagnosis and plan of care with patient and her mother  We will continue fetal testing  Will restart  antihypertensive oral protocol  Perinatologist was consulted who will see patient this morning  Precautions and plan of care were reviewed   gallstone diet discussed  We will continue serial labs      Shahbaz Obando M.D.

## 2021-09-25 NOTE — PROGRESS NOTES
Leila Navarro   32w2d  Admission DX: Elevated liver enzymes [R74.8]    Date of Admission: 9/23/2021  Patient Active Problem List    Diagnosis Date Noted   • Gestational hypertension, third trimester 09/21/2021       Subjective:   Patient complaining of headache not relieved by tylenol, visual blurriness, and does endorse RUQ pain while she was having her RUQ ultrasound. Denies RUQ pain without palpation. States vision is blurry now but before she was seeing white spots around the time that she was admitted  uterine contractions:no  pain: .no  LOF: no  vaginal Bleeding: no  fetal movement: normal    Objective:   Vitals:    09/24/21 1900 09/24/21 2000 09/24/21 2057 09/24/21 2147   BP: 131/78 120/74 129/75 120/70   Pulse: (!) 102 (!) 103 (!) 106 (!) 107   Resp:       Temp:  (!) 35.7 °C (96.2 °F)     TempSrc:  Temporal     SpO2:    97%   Weight:       Height:         NST: Baseline 120 with moderate variability, accelerations present, no decelerations noted.  Reactive NST.  At one point minimal variability was noted on the monitor.  Therefore BPP was ordered and BPP was 8 out of 8.  The monitoring system was then switched out and patient has not had any more periods of minimal variability.  Gen: A&Ox3 NAD  Membranes: ruptured: no  Abdomen: gravid, soft, NT  Ext: SCDs on, Nt, no cyanosis or clubbing    Meds:     Current Facility-Administered Medications:   •  lactated ringers infusion, , Intravenous, Continuous, Carroll Muniz M.D., Last Rate: 125 mL/hr at 09/24/21 2144, New Bag at 09/24/21 2144  •  ondansetron (ZOFRAN) syringe/vial injection 4 mg, 4 mg, Intravenous, Q6HRS PRN, Carroll Muniz M.D., 4 mg at 09/24/21 2107  •  calcium GLUConate injection 1 g, 1 g, Intravenous, Once PRN, Carroll Muniz M.D.  •  magnesium sulfate 40 g/1000mL infusion, 1 g/hr, Intravenous, Continuous, Haley Joyner D.O., Last Rate: 25 mL/hr at 09/24/21 2102, 1 g/hr at 09/24/21 2102  •  acetaminophen (Tylenol) tablet 650  mg, 650 mg, Oral, Q4HRS PRN, ANN MARIE Harris.SAMARA., 650 mg at 09/24/21 1452  •  butalbital/apap/caffeine -40 mg (Fioricet) per tablet 1 Tablet, 1 Tablet, Oral, Q6HRS PRN, Haley Joyner D.O., 1 Tablet at 09/24/21 2144  •  betamethasone acetate-betamethasone sodium phosphate (CELESTONE) injection 12 mg, 12 mg, Intramuscular, Q24HR, Carroll Muniz M.D., 12 mg at 09/23/21 2239    Labs:    Lab:   Recent Results (from the past 72 hour(s))   CBC WITH DIFFERENTIAL    Collection Time: 09/23/21  3:00 PM   Result Value Ref Range    WBC 8.7 4.8 - 10.8 K/uL    RBC 4.34 4.20 - 5.40 M/uL    Hemoglobin 10.7 (L) 12.0 - 16.0 g/dL    Hematocrit 33.3 (L) 37.0 - 47.0 %    MCV 76.7 (L) 81.4 - 97.8 fL    MCH 24.7 (L) 27.0 - 33.0 pg    MCHC 32.1 (L) 33.6 - 35.0 g/dL    RDW 39.1 35.9 - 50.0 fL    Platelet Count 422 164 - 446 K/uL    MPV 10.3 9.0 - 12.9 fL    Neutrophils-Polys 75.60 (H) 44.00 - 72.00 %    Lymphocytes 16.40 (L) 22.00 - 41.00 %    Monocytes 6.10 0.00 - 13.40 %    Eosinophils 0.60 0.00 - 6.90 %    Basophils 0.20 0.00 - 1.80 %    Immature Granulocytes 1.10 (H) 0.00 - 0.90 %    Nucleated RBC 0.00 /100 WBC    Neutrophils (Absolute) 6.57 2.00 - 7.15 K/uL    Lymphs (Absolute) 1.43 1.00 - 4.80 K/uL    Monos (Absolute) 0.53 0.00 - 0.85 K/uL    Eos (Absolute) 0.05 0.00 - 0.51 K/uL    Baso (Absolute) 0.02 0.00 - 0.12 K/uL    Immature Granulocytes (abs) 0.10 0.00 - 0.11 K/uL    NRBC (Absolute) 0.00 K/uL   Comp Metabolic Panel    Collection Time: 09/23/21  3:00 PM   Result Value Ref Range    Sodium 135 135 - 145 mmol/L    Potassium 4.1 3.6 - 5.5 mmol/L    Chloride 104 96 - 112 mmol/L    Co2 20 20 - 33 mmol/L    Anion Gap 11.0 7.0 - 16.0    Glucose 82 65 - 99 mg/dL    Bun 5 (L) 8 - 22 mg/dL    Creatinine 0.58 0.50 - 1.40 mg/dL    Calcium 9.5 8.5 - 10.5 mg/dL    AST(SGOT) 61 (H) 12 - 45 U/L    ALT(SGPT) 95 (H) 2 - 50 U/L    Alkaline Phosphatase 131 (H) 30 - 99 U/L    Total Bilirubin 0.3 0.1 - 1.5 mg/dL    Albumin 3.6 3.2 -  4.9 g/dL    Total Protein 7.2 6.0 - 8.2 g/dL    Globulin 3.6 (H) 1.9 - 3.5 g/dL    A-G Ratio 1.0 g/dL   PROTEIN/CREAT RATIO URINE    Collection Time: 09/23/21  3:00 PM   Result Value Ref Range    Total Protein, Urine 9.0 0.0 - 15.0 mg/dL    Creatinine, Random Urine 84.34 mg/dL    Protein Creatinine Ratio 107 10 - 107 mg/g   ESTIMATED GFR    Collection Time: 09/23/21  3:00 PM   Result Value Ref Range    GFR If African American >60 >60 mL/min/1.73 m 2    GFR If Non African American >60 >60 mL/min/1.73 m 2   CBC WITHOUT DIFFERENTIAL    Collection Time: 09/24/21  1:51 AM   Result Value Ref Range    WBC 9.6 4.8 - 10.8 K/uL    RBC 4.35 4.20 - 5.40 M/uL    Hemoglobin 10.6 (L) 12.0 - 16.0 g/dL    Hematocrit 33.1 (L) 37.0 - 47.0 %    MCV 76.1 (L) 81.4 - 97.8 fL    MCH 24.4 (L) 27.0 - 33.0 pg    MCHC 32.0 (L) 33.6 - 35.0 g/dL    RDW 38.7 35.9 - 50.0 fL    Platelet Count 378 164 - 446 K/uL    MPV 9.8 9.0 - 12.9 fL   Comp Metabolic Panel    Collection Time: 09/24/21  1:51 AM   Result Value Ref Range    Sodium 137 135 - 145 mmol/L    Potassium 4.3 3.6 - 5.5 mmol/L    Chloride 106 96 - 112 mmol/L    Co2 17 (L) 20 - 33 mmol/L    Anion Gap 14.0 7.0 - 16.0    Glucose 111 (H) 65 - 99 mg/dL    Bun 6 (L) 8 - 22 mg/dL    Creatinine 0.54 0.50 - 1.40 mg/dL    Calcium 9.1 8.5 - 10.5 mg/dL    AST(SGOT) 64 (H) 12 - 45 U/L    ALT(SGPT) 102 (H) 2 - 50 U/L    Alkaline Phosphatase 125 (H) 30 - 99 U/L    Total Bilirubin 0.3 0.1 - 1.5 mg/dL    Albumin 3.3 3.2 - 4.9 g/dL    Total Protein 7.1 6.0 - 8.2 g/dL    Globulin 3.8 (H) 1.9 - 3.5 g/dL    A-G Ratio 0.9 g/dL   HEPATITIS PANEL ACUTE(4 COMPONENTS)    Collection Time: 09/24/21  1:51 AM   Result Value Ref Range    Hepatitis B Surface Antigen Non-Reactive Non-Reactive    Hepatitis B Cors Ab,IgM Non-Reactive Non-Reactive    Hepatitis A Virus Ab, IgM Non-Reactive Non-Reactive    Hepatitis C Antibody Non-Reactive Non-Reactive   ESTIMATED GFR    Collection Time: 09/24/21  1:51 AM   Result Value Ref  Range    GFR If African American >60 >60 mL/min/1.73 m 2    GFR If Non African American >60 >60 mL/min/1.73 m 2   ABO AND RH DETERMINATION    Collection Time: 09/24/21  7:56 AM   Result Value Ref Range    ABO Grouping Only B     Rh Grouping Only POS    RUBELLA ABS IGG    Collection Time: 09/24/21  7:56 AM   Result Value Ref Range    Rubella IgG Antibody 161.00 IU/mL   SERUM MAGNESIUM LEVELS EVERY 6 HRS UNTIL DESIRED RANGE (5-8 MG/DL) ACHIEVED    Collection Time: 09/24/21  7:56 AM   Result Value Ref Range    Magnesium 2.0 1.5 - 2.5 mg/dL   SARS-CoV-2 PCR (24 hour In-House): Collect NP swab in [facility]    Collection Time: 09/24/21  8:20 AM    Specimen: Nasopharyngeal; Respirate   Result Value Ref Range    SARS-CoV-2 Source NP Swab     SARS-CoV-2 by PCR NotDetected    CBC WITHOUT DIFFERENTIAL    Collection Time: 09/24/21 10:40 AM   Result Value Ref Range    WBC 10.5 4.8 - 10.8 K/uL    RBC 4.35 4.20 - 5.40 M/uL    Hemoglobin 10.7 (L) 12.0 - 16.0 g/dL    Hematocrit 32.8 (L) 37.0 - 47.0 %    MCV 75.4 (L) 81.4 - 97.8 fL    MCH 24.6 (L) 27.0 - 33.0 pg    MCHC 32.6 (L) 33.6 - 35.0 g/dL    RDW 37.9 35.9 - 50.0 fL    Platelet Count 421 164 - 446 K/uL    MPV 9.7 9.0 - 12.9 fL   Comp Metabolic Panel    Collection Time: 09/24/21 10:40 AM   Result Value Ref Range    Sodium 135 135 - 145 mmol/L    Potassium 4.2 3.6 - 5.5 mmol/L    Chloride 103 96 - 112 mmol/L    Co2 17 (L) 20 - 33 mmol/L    Anion Gap 15.0 7.0 - 16.0    Glucose 141 (H) 65 - 99 mg/dL    Bun 5 (L) 8 - 22 mg/dL    Creatinine 0.50 0.50 - 1.40 mg/dL    Calcium 8.7 8.5 - 10.5 mg/dL    AST(SGOT) 74 (H) 12 - 45 U/L    ALT(SGPT) 114 (H) 2 - 50 U/L    Alkaline Phosphatase 129 (H) 30 - 99 U/L    Total Bilirubin 0.3 0.1 - 1.5 mg/dL    Albumin 4.1 3.2 - 4.9 g/dL    Total Protein 7.2 6.0 - 8.2 g/dL    Globulin 3.1 1.9 - 3.5 g/dL    A-G Ratio 1.3 g/dL   MAGNESIUM    Collection Time: 09/24/21 10:40 AM   Result Value Ref Range    Magnesium 4.3 (H) 1.5 - 2.5 mg/dL   ESTIMATED GFR     Collection Time: 21 10:40 AM   Result Value Ref Range    GFR If African American >60 >60 mL/min/1.73 m 2    GFR If Non African American >60 >60 mL/min/1.73 m 2   CBC WITHOUT DIFFERENTIAL    Collection Time: 21  3:50 PM   Result Value Ref Range    WBC 10.7 4.8 - 10.8 K/uL    RBC 4.05 (L) 4.20 - 5.40 M/uL    Hemoglobin 10.2 (L) 12.0 - 16.0 g/dL    Hematocrit 31.3 (L) 37.0 - 47.0 %    MCV 77.3 (L) 81.4 - 97.8 fL    MCH 25.2 (L) 27.0 - 33.0 pg    MCHC 32.6 (L) 33.6 - 35.0 g/dL    RDW 39.6 35.9 - 50.0 fL    Platelet Count 421 164 - 446 K/uL    MPV 9.8 9.0 - 12.9 fL   Comp Metabolic Panel    Collection Time: 21  3:50 PM   Result Value Ref Range    Sodium 135 135 - 145 mmol/L    Potassium 4.0 3.6 - 5.5 mmol/L    Chloride 105 96 - 112 mmol/L    Co2 20 20 - 33 mmol/L    Anion Gap 10.0 7.0 - 16.0    Glucose 118 (H) 65 - 99 mg/dL    Bun 4 (L) 8 - 22 mg/dL    Creatinine 0.56 0.50 - 1.40 mg/dL    Calcium 7.8 (L) 8.5 - 10.5 mg/dL    AST(SGOT) 80 (H) 12 - 45 U/L    ALT(SGPT) 119 (H) 2 - 50 U/L    Alkaline Phosphatase 120 (H) 30 - 99 U/L    Total Bilirubin 0.2 0.1 - 1.5 mg/dL    Albumin 3.5 3.2 - 4.9 g/dL    Total Protein 6.7 6.0 - 8.2 g/dL    Globulin 3.2 1.9 - 3.5 g/dL    A-G Ratio 1.1 g/dL   MAGNESIUM    Collection Time: 21  3:50 PM   Result Value Ref Range    Magnesium 5.4 (HH) 1.5 - 2.5 mg/dL   ESTIMATED GFR    Collection Time: 21  3:50 PM   Result Value Ref Range    GFR If African American >60 >60 mL/min/1.73 m 2    GFR If Non African American >60 >60 mL/min/1.73 m 2       Assessment/ Plan:  26 y.o.  @ 32w2d  Elevated liver enzymes [R74.8]  Headache and visual changes, however Bps WNL. Liver enzymes continue to increase. LDH and uric acid ordered with next set of labs. RUQ US demonstrated cholelithiasis with no signs of acute cholecystitis. Hepatitis panel negative.     Will plan to consult [facility] for possible atypical pre-eclampsia. Continue MgSO4 administration until 24 hours post second  dose of MBZ (due tonight)    24 hr protein currently being collected.     GBS in process

## 2021-09-25 NOTE — CONSULTS
DATE OF SERVICE:  09/25/2021     REQUESTING PHYSICIAN:  Dr. Joyner.     HISTORY OF PRESENT ILLNESS:  This is a 26-year-old G2, P1, currently EDC   11/17/2021, now at 32 weeks and 3 days.  The patient has been in the hospital   for the past 2 days where she presented with complaints of headaches and was   also reporting visual changes, light sensitivity.  Also, she has been started   on labetalol 200 mg twice a day and has been on this regimen for approximately   2 weeks when she was found to have elevated blood pressures.  During this I   was contacted due to rising liver enzymes and upon admission, her AST was   noted to be 61, ALT 95, and today's laboratory shows AST of 134, ALT of 184.    Her uric acid was 3.1 yesterday and serum mag was in the therapeutic range.   Lipase, amylase are normal.  LDH mildly elevated at 279.  Total protein 120 mg   in a 24-hour period.     Her hematology studies shows anemia, 29.7 hematocrit, hemoglobin 9.7, platelet   count 388,000.  She has been vaccinated to COVID.  Her hepatitis studies are   all negative and she is negative on swab for COVID.  She is B positive.    Ultrasound was performed yesterday indicating an IUGR with abdominal   circumference at the 3rd percentile.  Also, biophysical profile was 8/8.    Current fetal heart rate tracing is reactive.     Ultrasound performed yesterday reveals cholelithiasis and right renal   hydronephrosis.     PAST MEDICAL HISTORY:  She denies any major medical problems, asthma,   seizures, prior hypertension, cardiovascular, GI, or  diseases.     PREVIOUS OPERATIONS:  None.     TRANSFUSIONS:  None.     ALLERGIES:  None.     VENEREAL DISEASE HISTORY:  None.     HABITS:  None.     CURRENT MEDICATIONS:  Labetalol 200 mg b.i.d., Fioricet twice a day.  Also,   she is on aspirin 81 mg per day.     PHYSICAL EXAMINATION:  GENERAL:  Well-developed, well-nourished female, alert and oriented x3, in no   acute distress.  EXTREMITIES:  Edema  none.  NEUROLOGIC:  The reflexes are 2+.  Cranial nerves II-XII grossly intact.     ASSESSMENT:  1.  Intrauterine pregnancy at 32 weeks and 3 days.  2.  IUGR.  3.  Gestational hypertensive.  4.  Elevated liver enzymes, there appears to be 2 potential causes, possible   preeclampsia versus secondary to cholelithiasis.  5.  History of syncopal episode.  6. Headaches, but no other neurological symptoms to suggest she has   neurological complications from preeclampsia.  7.   ancestry.  Hemoglobin electrophoresis not present.  8.  Anemia, probably iron deficiency, rule out thalassemia.     I spoke to the patient and also reviewed the case with Dr. Obando.     PLAN:  At the present time, recommend hemoglobin electrophoresis.  Also, iron   studies.  I will continue to monitor her liver enzymes.  Her enzymes may be   secondary to the stones that were identified, but I cannot be certain of this.    We will need to continue with surveillance of her chemistry and CBCs and   monitor for evidence of severe preeclampsia.  This is difficult diagnostic   case at this time and this will be something that we will need to watch to   evolve.  At the present time, my estimation is that she does not have   preeclampsia.     All questions answered to satisfaction.    Time: 115 minutes.        ______________________________  MD RAH LIAO/DREW/JOHANA    DD:  09/25/2021 12:17  DT:  09/25/2021 13:25    Job#:  [id number]

## 2021-09-25 NOTE — PROGRESS NOTES
) Report received from Grace JUAREZ RN, POC discussed, assumed care of patient at this time  ) US tech at bedside  ) Assessment performed. Patient is a  EDC  which makes her 32.2 weeks. Patient denies any vaginal bleeding or LOF. Patient states she is having irregular contractions at this time. Patient has no abdomen tenderness. States positive fetal movement.  ) BPP 8/8. Dr. Joyner at bedside to see patient, orders received to decrease magnesium infusion to 1g/hr as patient feels SOB and dizzy.   ) Lab at bedside for mag draw  0700) Report to Meaghan SMITH RN, POC discussed

## 2021-09-26 VITALS
SYSTOLIC BLOOD PRESSURE: 122 MMHG | HEIGHT: 67 IN | TEMPERATURE: 97.3 F | DIASTOLIC BLOOD PRESSURE: 78 MMHG | BODY MASS INDEX: 30.76 KG/M2 | RESPIRATION RATE: 18 BRPM | WEIGHT: 196 LBS | OXYGEN SATURATION: 98 % | HEART RATE: 97 BPM

## 2021-09-26 LAB
ALBUMIN SERPL BCP-MCNC: 3.8 G/DL (ref 3.2–4.9)
ALBUMIN/GLOB SERPL: 1.7 G/DL
ALP SERPL-CCNC: 109 U/L (ref 30–99)
ALT SERPL-CCNC: 212 U/L (ref 2–50)
ANION GAP SERPL CALC-SCNC: 12 MMOL/L (ref 7–16)
AST SERPL-CCNC: 112 U/L (ref 12–45)
BILIRUB SERPL-MCNC: 0.2 MG/DL (ref 0.1–1.5)
BUN SERPL-MCNC: 7 MG/DL (ref 8–22)
CALCIUM SERPL-MCNC: 8.5 MG/DL (ref 8.5–10.5)
CHLORIDE SERPL-SCNC: 103 MMOL/L (ref 96–112)
CO2 SERPL-SCNC: 19 MMOL/L (ref 20–33)
CREAT SERPL-MCNC: 0.57 MG/DL (ref 0.5–1.4)
ERYTHROCYTE [DISTWIDTH] IN BLOOD BY AUTOMATED COUNT: 39.1 FL (ref 35.9–50)
GLOBULIN SER CALC-MCNC: 2.3 G/DL (ref 1.9–3.5)
GLUCOSE SERPL-MCNC: 114 MG/DL (ref 65–99)
HCT VFR BLD AUTO: 29 % (ref 37–47)
HGB BLD-MCNC: 9.2 G/DL (ref 12–16)
IRON SATN MFR SERPL: 5 % (ref 15–55)
IRON SERPL-MCNC: 24 UG/DL (ref 40–170)
MCH RBC QN AUTO: 24.3 PG (ref 27–33)
MCHC RBC AUTO-ENTMCNC: 31.7 G/DL (ref 33.6–35)
MCV RBC AUTO: 76.5 FL (ref 81.4–97.8)
PLATELET # BLD AUTO: 374 K/UL (ref 164–446)
PMV BLD AUTO: 10.1 FL (ref 9–12.9)
POTASSIUM SERPL-SCNC: 3.8 MMOL/L (ref 3.6–5.5)
PROT SERPL-MCNC: 6.1 G/DL (ref 6–8.2)
RBC # BLD AUTO: 3.79 M/UL (ref 4.2–5.4)
SODIUM SERPL-SCNC: 134 MMOL/L (ref 135–145)
TIBC SERPL-MCNC: 451 UG/DL (ref 250–450)
UIBC SERPL-MCNC: 427 UG/DL (ref 110–370)
WBC # BLD AUTO: 11.3 K/UL (ref 4.8–10.8)

## 2021-09-26 PROCEDURE — 99217 PR OBSERVATION CARE DISCHARGE: CPT | Mod: 25 | Performed by: OBSTETRICS & GYNECOLOGY

## 2021-09-26 PROCEDURE — 59025 FETAL NON-STRESS TEST: CPT | Mod: 26 | Performed by: OBSTETRICS & GYNECOLOGY

## 2021-09-26 PROCEDURE — A9270 NON-COVERED ITEM OR SERVICE: HCPCS | Performed by: OBSTETRICS & GYNECOLOGY

## 2021-09-26 PROCEDURE — 36415 COLL VENOUS BLD VENIPUNCTURE: CPT

## 2021-09-26 PROCEDURE — 83550 IRON BINDING TEST: CPT

## 2021-09-26 PROCEDURE — 700102 HCHG RX REV CODE 250 W/ 637 OVERRIDE(OP): Performed by: OBSTETRICS & GYNECOLOGY

## 2021-09-26 PROCEDURE — 83021 HEMOGLOBIN CHROMOTOGRAPHY: CPT

## 2021-09-26 PROCEDURE — 83540 ASSAY OF IRON: CPT

## 2021-09-26 PROCEDURE — 85027 COMPLETE CBC AUTOMATED: CPT

## 2021-09-26 PROCEDURE — 80053 COMPREHEN METABOLIC PANEL: CPT

## 2021-09-26 PROCEDURE — G0378 HOSPITAL OBSERVATION PER HR: HCPCS

## 2021-09-26 RX ORDER — ACETAMINOPHEN 325 MG/1
650 TABLET ORAL EVERY 4 HOURS PRN
Qty: 30 TABLET | Refills: 0 | Status: SHIPPED | OUTPATIENT
Start: 2021-09-26

## 2021-09-26 RX ADMIN — BUTALBITAL, ACETAMINOPHEN, AND CAFFEINE 1 TABLET: 50; 325; 40 TABLET ORAL at 08:36

## 2021-09-26 RX ADMIN — LABETALOL HYDROCHLORIDE 200 MG: 100 TABLET, FILM COATED ORAL at 08:36

## 2021-09-26 ASSESSMENT — PAIN DESCRIPTION - PAIN TYPE: TYPE: ACUTE PAIN

## 2021-09-26 NOTE — PROGRESS NOTES
1900: report received from Meaghan WILLIAMSON RN.POC discussed, care assumed. Pt currently resting in bed, no needs at this time.    0700: report given to Gianna RAMIREZ

## 2021-09-26 NOTE — DISCHARGE SUMMARY
Discharge Summary:      Liela Navarro    Admit Date:   2021  Discharge Date:  2021     Admitting diagnosis:  Elevated liver enzymes [R74.8]  Discharge Diagnosis: Intrauterine pregnancy at 32-4/7 weeks, gestational hypertension, cholelithiasis  Pregnancy Complications: Gestational hypertension, cholelithiasis, migraine headaches        History:  Past Medical History:   Diagnosis Date   • Hypertension      OB History    Para Term  AB Living   2 1 1     1   SAB TAB Ectopic Molar Multiple Live Births           0 1      # Outcome Date GA Lbr Emanuel/2nd Weight Sex Delivery Anes PTL Lv   2 Current            1 Term 10/25/18 37w1d 01:45 / 00:59 2.57 kg (5 lb 10.7 oz) F Vag-Spont EPI N LUISA        Patient has no known allergies.  Patient Active Problem List    Diagnosis Date Noted   • Gestational hypertension, third trimester 2021        Hospital Course:   26 y.o.  at 32-4/7 weeks estimated gestational age was admitted for elevated blood pressures and elevated transaminases.  She received 2 doses of betamethasone and was observed for potential atypical eclampsia.  The patient was complaining of intermittent headaches and right upper quadrant pain, which were improved at the time of discharge.  The patient also received magnesium sulfate through her steroid window.  Blood pressures normalized on labetalol 200 mg p.o. twice daily.  Liver function tests were trending downwards at the time of discharge.  She was noted to be anemic, 10.7.  Laboratory studies consistent with iron deficiency anemia.  Hemoglobin electrophoresis was pending at the time of discharge.    Vitals:    21 0040 21 0440 21 0827 21 1151   BP: 117/57 114/65 122/78    Pulse: 93 84 88 97   Resp:       Temp: 36.9 °C (98.5 °F) 36.7 °C (98 °F) 36.3 °C (97.3 °F)    TempSrc: Temporal Temporal Temporal    SpO2:    98%   Weight:       Height:           Current Facility-Administered Medications   Medication  Dose   • labetalol (NORMODYNE) tablet 200 mg  200 mg   • lactated ringers infusion     • ondansetron (ZOFRAN) syringe/vial injection 4 mg  4 mg   • magnesium sulfate 40 g/1000mL infusion  1 g/hr   • acetaminophen (Tylenol) tablet 650 mg  650 mg   • butalbital/apap/caffeine -40 mg (Fioricet) per tablet 1 Tablet  1 Tablet       Exam:    GENERAL: Alert, in no apparent distress  PSYCHIATRIC: Appropriate affect, intact insight and judgement.  RESPIRATORY: Normal respiratory effort.  Lungs clear to auscultation.   CARDIOVASCULAR: RRR, no murmur, gallop, or rub.  ABDOMEN: Soft, gravid, NT  EXTREMITIES: No edema, no calf tenerness  SKIN: No rash    NST INTERPRETATION - PER MY READ    INDICATIONS: gestational hypertension  UTERINE ACTIVITY: occasional contractions, + uterine irritability  BASELINE: 140s  VARIABILITY: moderate  ACCELERATIONS: present  DECELERATIONS: absent  CATEGORY 1 TRACING    Ultrasound from 2021    1747 g at the 15th percentile.  ALANIS equals 8.08    Labs:  Recent Labs     21  0409 21  1159 21  0307   WBC 10.9* 10.8 11.3*   RBC 3.85* 4.16* 3.79*   HEMOGLOBIN 9.7* 10.5* 9.2*   HEMATOCRIT 29.7* 31.8* 29.0*   MCV 77.1* 76.4* 76.5*   MCH 25.2* 25.2* 24.3*   MCHC 32.7* 33.0* 31.7*   RDW 39.2 39.3 39.1   PLATELETCT 388 409 374   MPV 10.1 9.8 10.1        Activity:   Discharge to home      Assessment:  26-year-old -0-0-1 at 32-4/7 weeks admitted for observation to rule out preeclampsia.  Blood pressures responded well to labetalol 200 mg p.o. twice daily.  Liver function tests trending downward at the time of discharge.  Gallstones noted on ultrasound.  Continues with intermittent migraine headaches.       Follow up: .Friday as scheduled at [facility] [place][note 2].   labor and PIH precautions reviewed.  The patient was instructed to return to labor and delivery for headache unrelieved by Tylenol or Fioricet, worsening right upper quadrant pain, or increase in swelling in the  hands and feet.     Discharge Meds:   Current Outpatient Medications   Medication Sig Dispense Refill   • acetaminophen (TYLENOL) 325 MG Tab Take 2 Tablets by mouth every four hours as needed. 30 Tablet 0   Labetalol 200 mg p.o. twice daily  Fioricet as per previously prescribed as needed headache.    Katelin Carty M.D.

## 2021-09-26 NOTE — CARE PLAN
Problem: Knowledge Deficit - L&D  Goal: Patient and family/caregivers will demonstrate understanding of plan of care, disease process/condition, diagnostic tests and medications  Outcome: Progressing     Problem: Risk for Excess Fluid Volume  Goal: Patient will demonstrate pulse, blood pressure and neurologic signs within expected ranges and without any respiratory complications  Outcome: Progressing   The patient is Watcher - Medium risk of patient condition declining or worsening

## 2021-09-26 NOTE — DISCHARGE INSTRUCTIONS
General Instructions:  · If you think you are in labor, time contractions (lying on your left side) from the beginning of one contraction to the beginning of the next contraction for at least one hour.  · Increase fluid intake: you should consume 10-12 8 oz glasses of non-caffeinated fluid per day.  · Report any pressure or burning on urination to your physician.  · Monitor fetal movement: If you notice an absence or decrease in fetal movement, drink a large glass of water and rest on your side.  If there is no increase in movement, call your physician or go to the hospital for further evaluation.  · Report any sudden, sharp abdominal pain.  · Report any bleeding.  Spotting or pinkish discharge is normal after vaginal exam.  You may also spot after sexual intercourse.    Preeclampsia and Eclampsia  Preeclampsia is a serious condition that may develop during pregnancy. This condition causes high blood pressure and increased protein in your urine along with other symptoms, such as headaches and vision changes. These symptoms may develop as the condition gets worse. Preeclampsia may occur at 20 weeks of pregnancy or later.  Diagnosing and treating preeclampsia early is very important. If not treated early, it can cause serious problems for you and your baby. One problem it can lead to is eclampsia. Eclampsia is a condition that causes muscle jerking or shaking (convulsions or seizures) and other serious problems for the mother. During pregnancy, delivering your baby may be the best treatment for preeclampsia or eclampsia. For most women, preeclampsia and eclampsia symptoms go away after giving birth.  In rare cases, a woman may develop preeclampsia after giving birth (postpartum preeclampsia). This usually occurs within 48 hours after childbirth but may occur up to 6 weeks after giving birth.  What are the causes?  The cause of preeclampsia is not known.  What increases the risk?  The following risk factors make you  more likely to develop preeclampsia:  · Being pregnant for the first time.  · Having had preeclampsia during a past pregnancy.  · Having a family history of preeclampsia.  · Having high blood pressure.  · Being pregnant with more than one baby.  · Being 35 or older.  · Being -American.  · Having kidney disease or diabetes.  · Having medical conditions such as lupus or blood diseases.  · Being very overweight (obese).  What are the signs or symptoms?  The most common symptoms are:  · Severe headaches.  · Vision problems, such as blurred or double vision.  · Abdominal pain, especially upper abdominal pain.  Other symptoms that may develop as the condition gets worse include:  · Sudden weight gain.  · Sudden swelling of the hands, face, legs, and feet.  · Severe nausea and vomiting.  · Numbness in the face, arms, legs, and feet.  · Dizziness.  · Urinating less than usual.  · Slurred speech.  · Convulsions or seizures.  How is this diagnosed?  There are no screening tests for preeclampsia. Your health care provider will ask you about symptoms and check for signs of preeclampsia during your prenatal visits. You may also have tests that include:  · Checking your blood pressure.  · Urine tests to check for protein. Your health care provider will check for this at every prenatal visit.  · Blood tests.  · Monitoring your baby's heart rate.  · Ultrasound.  How is this treated?  You and your health care provider will determine the treatment approach that is best for you. Treatment may include:  · Having more frequent prenatal exams to check for signs of preeclampsia, if you have an increased risk for preeclampsia.  · Medicine to lower your blood pressure.  · Staying in the hospital, if your condition is severe. There, treatment will focus on controlling your blood pressure and the amount of fluids in your body (fluid retention).  · Taking medicine (magnesium sulfate) to prevent seizures. This may be given as an  injection or through an IV.  · Taking a low-dose aspirin during your pregnancy.  · Delivering your baby early. You may have your labor started with medicine (induced), or you may have a  delivery.  Follow these instructions at home:  Eating and drinking    · Drink enough fluid to keep your urine pale yellow.  · Avoid caffeine.  Lifestyle  · Do not use any products that contain nicotine or tobacco, such as cigarettes and e-cigarettes. If you need help quitting, ask your health care provider.  · Do not use alcohol or drugs.  · Avoid stress as much as possible. Rest and get plenty of sleep.  General instructions  · Take over-the-counter and prescription medicines only as told by your health care provider.  · When lying down, lie on your left side. This keeps pressure off your major blood vessels.  · When sitting or lying down, raise (elevate) your feet. Try putting some pillows underneath your lower legs.  · Exercise regularly. Ask your health care provider what kinds of exercise are best for you.  · Keep all follow-up and prenatal visits as told by your health care provider. This is important.  How is this prevented?  There is no known way of preventing preeclampsia or eclampsia from developing. However, to lower your risk of complications and detect problems early:  · Get regular prenatal care. Your health care provider may be able to diagnose and treat the condition early.  · Maintain a healthy weight. Ask your health care provider for help managing weight gain during pregnancy.  · Work with your health care provider to manage any long-term (chronic) health conditions you have, such as diabetes or kidney problems.  · You may have tests of your blood pressure and kidney function after giving birth.  · Your health care provider may have you take low-dose aspirin during your next pregnancy.  Contact a health care provider if:  · You have symptoms that your health care provider told you may require more  treatment or monitoring, such as:  ? Headaches.  ? Nausea or vomiting.  ? Abdominal pain.  ? Dizziness.  ? Light-headedness.  Get help right away if:  · You have severe:  ? Abdominal pain.  ? Headaches that do not get better.  ? Dizziness.  ? Vision problems.  ? Confusion.  ? Nausea or vomiting.  · You have any of the following:  ? A seizure.  ? Sudden, rapid weight gain.  ? Sudden swelling in your hands, ankles, or face.  ? Trouble moving any part of your body.  ? Numbness in any part of your body.  ? Trouble speaking.  ? Abnormal bleeding.  · You faint.  Summary  · Preeclampsia is a serious condition that may develop during pregnancy.  · This condition causes high blood pressure and increased protein in your urine along with other symptoms, such as headaches and vision changes.  · Diagnosing and treating preeclampsia early is very important. If not treated early, it can cause serious problems for you and your baby.  · Get help right away if you have symptoms that your health care provider told you to watch for.  This information is not intended to replace advice given to you by your health care provider. Make sure you discuss any questions you have with your health care provider.  Document Released: 12/15/2001 Document Revised: 08/20/2019 Document Reviewed: 07/24/2017  [employer] Patient Education © 2020 [employer]    Pre-term Labor (<37 weeks):  Call your physician or return to the hospital if:  · You have painless regular contractions more than 4 in one hour.  · Your water breaks (remember time and color).  · You have menstrual-like cramps, a low dull backache or pressure in your pelvis or back.  · Your baby does not move enough to complete the daily kick count (10 movements in 2 hours).  · Your baby moves much less often than on the days before or you have not felt your baby move all day.  · Please review the MEDICATION LIST section of your AFTER VISIT SUMMARY document.  · Take your medication as  prescribed      Other Instructions:  Please carefully review your entire AFTER VISIT SUMMARY document for all discharge instructions.

## 2021-09-26 NOTE — CARE PLAN
Problem: Knowledge Deficit - L&D  Goal: Patient and family/caregivers will demonstrate understanding of plan of care, disease process/condition, diagnostic tests and medications  Outcome: Progressing     Problem: Risk for Excess Fluid Volume  Goal: Patient will demonstrate pulse, blood pressure and neurologic signs within expected ranges and without any respiratory complications  Outcome: Progressing

## 2021-09-26 NOTE — PROGRESS NOTES
"0700- Report received from JAMES Marie. POC discussed and assumed.   0827- Assessment completed. VSS. Pt states 8/10 pain in right upper abdominal quadrant, especially when she touches the area. Pt states she \"feels a migraine coming on\".  Fiurocet given (see MAR). Pt declines needing any additional tylenol at this time. Heat pack given for comfort. PIV in left hand flushed and SL. Pt is concerned about POC and possible discharge. She would like to speak to a Dr when available.   1150- Dr Carty at bedside to assess and talk with pt. Orders to discharge pt home with labor precautions and preeclampsia education.   1155- PIV d/c'd and pressure gauze applied. EFM and TOCO d/c'd.   1230- Discharge instructions provided with labor precautions and pre-eclampsia education. Pt verbalized understanding and all questions answered. Pt discharged home.   "

## 2021-09-28 LAB
HGB A1 MFR BLD: 96.2 % (ref 95–97.9)
HGB A2 MFR BLD: 3.1 % (ref 2–3.5)
HGB C MFR BLD: 0 % (ref 0–0)
HGB E MFR BLD: 0 % (ref 0–0)
HGB F MFR BLD: 0.7 % (ref 0–2.1)
HGB FRACT BLD ELPH-IMP: NORMAL
HGB OTHER MFR BLD: 0 % (ref 0–0)
HGB S BLD QL SOLY: NORMAL
HGB S MFR BLD: 0 % (ref 0–0)
PATH INTERP BLD-IMP: NORMAL

## 2021-10-01 ENCOUNTER — ROUTINE PRENATAL (OUTPATIENT)
Dept: OBGYN | Facility: CLINIC | Age: 26
End: 2021-10-01
Payer: MEDICAID

## 2021-10-01 VITALS — DIASTOLIC BLOOD PRESSURE: 86 MMHG | SYSTOLIC BLOOD PRESSURE: 138 MMHG | BODY MASS INDEX: 30.38 KG/M2 | WEIGHT: 194 LBS

## 2021-10-01 DIAGNOSIS — O09.93 HIGH-RISK PREGNANCY SUPERVISION, THIRD TRIMESTER: ICD-10-CM

## 2021-10-01 DIAGNOSIS — O16.3 HYPERTENSION COMPLICATING PREGNANCY IN THIRD TRIMESTER: ICD-10-CM

## 2021-10-01 DIAGNOSIS — G43.919 INTRACTABLE MIGRAINE WITHOUT STATUS MIGRAINOSUS, UNSPECIFIED MIGRAINE TYPE: ICD-10-CM

## 2021-10-01 DIAGNOSIS — K80.20 GALLSTONES: ICD-10-CM

## 2021-10-01 DIAGNOSIS — O13.3 GESTATIONAL HYPERTENSION, THIRD TRIMESTER: ICD-10-CM

## 2021-10-01 DIAGNOSIS — R79.89 ELEVATED LFTS: ICD-10-CM

## 2021-10-01 PROCEDURE — 90040 PR PRENATAL FOLLOW UP: CPT | Performed by: OBSTETRICS & GYNECOLOGY

## 2021-10-01 ASSESSMENT — FIBROSIS 4 INDEX: FIB4 SCORE: 0.49

## 2021-10-01 NOTE — NON-PROVIDER
Pt here today for OB follow up  Pt states she is still feeling dizzy, still seeing spots in her vision, and headaches.   Reports +FM  Good # [phone]  Pharmacy Confirmed.  Chaperone offered and not indicated.   Pt would like to think about the flu vaccine, ask at next visit.

## 2021-10-01 NOTE — PROGRESS NOTES
S: Pt presents for routine OB follow up.  Patient states she is feeling much better today compared to the past few days.patient states she is home on bedrest.she has had a few episodes of transient dizziness similar to when she came into the hospital.  She still gets some occasional headaches which is controlled with Fioricet.  She reports she had some gallbladder pain for couple days but no symptoms today.  She states she is on a low-fat diet because of the gallstones.  Reports good fetal movement.  No contractions, vaginal bleeding, or leakage of fluid.  Patient states she would like to schedule her induction of labor at 37 weeks because her partner does long-haul drive and I placed request for procedure to be scheduled at 37 weeks.  Patient states her mom is here from [place] helping her.  Questions answered.    O: /86   Wt 88 kg (194 lb)   BMI 30.38 kg/m²   Patients' weight gain, fluid intake and exercise level discussed.  Vitals, fundal height , fetal position, and FHR reviewed on flowsheet    Lab:  Recent Results (from the past 336 hour(s))   PROTEIN/CREAT RATIO URINE    Collection Time: 09/17/21  5:30 PM   Result Value Ref Range    Total Protein, Urine 30.0 (H) 0.0 - 15.0 mg/dL    Creatinine, Random Urine 217.18 mg/dL    Protein Creatinine Ratio 138 (H) 10 - 107 mg/g   URINALYSIS    Collection Time: 09/17/21  5:30 PM    Specimen: Urine, Clean Catch   Result Value Ref Range    Color Yellow     Character Clear     Specific Gravity 1.032 <1.035    Ph 6.0 5.0 - 8.0    Glucose Negative Negative mg/dL    Ketones Trace (A) Negative mg/dL    Protein Negative Negative mg/dL    Bilirubin Negative Negative    Urobilinogen, Urine 1.0 Negative    Nitrite Negative Negative    Leukocyte Esterase Negative Negative    Occult Blood Negative Negative    Micro Urine Req see below    CBC WITH DIFFERENTIAL    Collection Time: 09/17/21  6:10 PM   Result Value Ref Range    WBC 7.8 4.8 - 10.8 K/uL    RBC 4.27 4.20 - 5.40  M/uL    Hemoglobin 10.6 (L) 12.0 - 16.0 g/dL    Hematocrit 33.1 (L) 37.0 - 47.0 %    MCV 77.5 (L) 81.4 - 97.8 fL    MCH 24.8 (L) 27.0 - 33.0 pg    MCHC 32.0 (L) 33.6 - 35.0 g/dL    RDW 39.1 35.9 - 50.0 fL    Platelet Count 397 164 - 446 K/uL    MPV 9.8 9.0 - 12.9 fL    Neutrophils-Polys 73.10 (H) 44.00 - 72.00 %    Lymphocytes 18.60 (L) 22.00 - 41.00 %    Monocytes 6.40 0.00 - 13.40 %    Eosinophils 0.80 0.00 - 6.90 %    Basophils 0.30 0.00 - 1.80 %    Immature Granulocytes 0.80 0.00 - 0.90 %    Nucleated RBC 0.00 /100 WBC    Neutrophils (Absolute) 5.73 2.00 - 7.15 K/uL    Lymphs (Absolute) 1.46 1.00 - 4.80 K/uL    Monos (Absolute) 0.50 0.00 - 0.85 K/uL    Eos (Absolute) 0.06 0.00 - 0.51 K/uL    Baso (Absolute) 0.02 0.00 - 0.12 K/uL    Immature Granulocytes (abs) 0.06 0.00 - 0.11 K/uL    NRBC (Absolute) 0.00 K/uL   Comp Metabolic Panel    Collection Time: 09/17/21  6:10 PM   Result Value Ref Range    Sodium 135 135 - 145 mmol/L    Potassium 3.8 3.6 - 5.5 mmol/L    Chloride 101 96 - 112 mmol/L    Co2 18 (L) 20 - 33 mmol/L    Anion Gap 16.0 7.0 - 16.0    Glucose 105 (H) 65 - 99 mg/dL    Bun 10 8 - 22 mg/dL    Creatinine 0.57 0.50 - 1.40 mg/dL    Calcium 9.5 8.5 - 10.5 mg/dL    AST(SGOT) 19 12 - 45 U/L    ALT(SGPT) 21 2 - 50 U/L    Alkaline Phosphatase 124 (H) 30 - 99 U/L    Total Bilirubin 0.2 0.1 - 1.5 mg/dL    Albumin 3.6 3.2 - 4.9 g/dL    Total Protein 7.1 6.0 - 8.2 g/dL    Globulin 3.5 1.9 - 3.5 g/dL    A-G Ratio 1.0 g/dL   ESTIMATED GFR    Collection Time: 09/17/21  6:10 PM   Result Value Ref Range    GFR If African American >60 >60 mL/min/1.73 m 2    GFR If Non African American >60 >60 mL/min/1.73 m 2   CBC WITH DIFFERENTIAL    Collection Time: 09/23/21  3:00 PM   Result Value Ref Range    WBC 8.7 4.8 - 10.8 K/uL    RBC 4.34 4.20 - 5.40 M/uL    Hemoglobin 10.7 (L) 12.0 - 16.0 g/dL    Hematocrit 33.3 (L) 37.0 - 47.0 %    MCV 76.7 (L) 81.4 - 97.8 fL    MCH 24.7 (L) 27.0 - 33.0 pg    MCHC 32.1 (L) 33.6 - 35.0 g/dL     RDW 39.1 35.9 - 50.0 fL    Platelet Count 422 164 - 446 K/uL    MPV 10.3 9.0 - 12.9 fL    Neutrophils-Polys 75.60 (H) 44.00 - 72.00 %    Lymphocytes 16.40 (L) 22.00 - 41.00 %    Monocytes 6.10 0.00 - 13.40 %    Eosinophils 0.60 0.00 - 6.90 %    Basophils 0.20 0.00 - 1.80 %    Immature Granulocytes 1.10 (H) 0.00 - 0.90 %    Nucleated RBC 0.00 /100 WBC    Neutrophils (Absolute) 6.57 2.00 - 7.15 K/uL    Lymphs (Absolute) 1.43 1.00 - 4.80 K/uL    Monos (Absolute) 0.53 0.00 - 0.85 K/uL    Eos (Absolute) 0.05 0.00 - 0.51 K/uL    Baso (Absolute) 0.02 0.00 - 0.12 K/uL    Immature Granulocytes (abs) 0.10 0.00 - 0.11 K/uL    NRBC (Absolute) 0.00 K/uL   Comp Metabolic Panel    Collection Time: 09/23/21  3:00 PM   Result Value Ref Range    Sodium 135 135 - 145 mmol/L    Potassium 4.1 3.6 - 5.5 mmol/L    Chloride 104 96 - 112 mmol/L    Co2 20 20 - 33 mmol/L    Anion Gap 11.0 7.0 - 16.0    Glucose 82 65 - 99 mg/dL    Bun 5 (L) 8 - 22 mg/dL    Creatinine 0.58 0.50 - 1.40 mg/dL    Calcium 9.5 8.5 - 10.5 mg/dL    AST(SGOT) 61 (H) 12 - 45 U/L    ALT(SGPT) 95 (H) 2 - 50 U/L    Alkaline Phosphatase 131 (H) 30 - 99 U/L    Total Bilirubin 0.3 0.1 - 1.5 mg/dL    Albumin 3.6 3.2 - 4.9 g/dL    Total Protein 7.2 6.0 - 8.2 g/dL    Globulin 3.6 (H) 1.9 - 3.5 g/dL    A-G Ratio 1.0 g/dL   PROTEIN/CREAT RATIO URINE    Collection Time: 09/23/21  3:00 PM   Result Value Ref Range    Total Protein, Urine 9.0 0.0 - 15.0 mg/dL    Creatinine, Random Urine 84.34 mg/dL    Protein Creatinine Ratio 107 10 - 107 mg/g   ESTIMATED GFR    Collection Time: 09/23/21  3:00 PM   Result Value Ref Range    GFR If African American >60 >60 mL/min/1.73 m 2    GFR If Non African American >60 >60 mL/min/1.73 m 2   CBC WITHOUT DIFFERENTIAL    Collection Time: 09/24/21  1:51 AM   Result Value Ref Range    WBC 9.6 4.8 - 10.8 K/uL    RBC 4.35 4.20 - 5.40 M/uL    Hemoglobin 10.6 (L) 12.0 - 16.0 g/dL    Hematocrit 33.1 (L) 37.0 - 47.0 %    MCV 76.1 (L) 81.4 - 97.8 fL    MCH  24.4 (L) 27.0 - 33.0 pg    MCHC 32.0 (L) 33.6 - 35.0 g/dL    RDW 38.7 35.9 - 50.0 fL    Platelet Count 378 164 - 446 K/uL    MPV 9.8 9.0 - 12.9 fL   Comp Metabolic Panel    Collection Time: 09/24/21  1:51 AM   Result Value Ref Range    Sodium 137 135 - 145 mmol/L    Potassium 4.3 3.6 - 5.5 mmol/L    Chloride 106 96 - 112 mmol/L    Co2 17 (L) 20 - 33 mmol/L    Anion Gap 14.0 7.0 - 16.0    Glucose 111 (H) 65 - 99 mg/dL    Bun 6 (L) 8 - 22 mg/dL    Creatinine 0.54 0.50 - 1.40 mg/dL    Calcium 9.1 8.5 - 10.5 mg/dL    AST(SGOT) 64 (H) 12 - 45 U/L    ALT(SGPT) 102 (H) 2 - 50 U/L    Alkaline Phosphatase 125 (H) 30 - 99 U/L    Total Bilirubin 0.3 0.1 - 1.5 mg/dL    Albumin 3.3 3.2 - 4.9 g/dL    Total Protein 7.1 6.0 - 8.2 g/dL    Globulin 3.8 (H) 1.9 - 3.5 g/dL    A-G Ratio 0.9 g/dL   HEPATITIS PANEL ACUTE(4 COMPONENTS)    Collection Time: 09/24/21  1:51 AM   Result Value Ref Range    Hepatitis B Surface Antigen Non-Reactive Non-Reactive    Hepatitis B Cors Ab,IgM Non-Reactive Non-Reactive    Hepatitis A Virus Ab, IgM Non-Reactive Non-Reactive    Hepatitis C Antibody Non-Reactive Non-Reactive   ESTIMATED GFR    Collection Time: 09/24/21  1:51 AM   Result Value Ref Range    GFR If African American >60 >60 mL/min/1.73 m 2    GFR If Non African American >60 >60 mL/min/1.73 m 2   ABO AND RH DETERMINATION    Collection Time: 09/24/21  7:56 AM   Result Value Ref Range    ABO Grouping Only B     Rh Grouping Only POS    RUBELLA ABS IGG    Collection Time: 09/24/21  7:56 AM   Result Value Ref Range    Rubella IgG Antibody 161.00 IU/mL   SERUM MAGNESIUM LEVELS EVERY 6 HRS UNTIL DESIRED RANGE (5-8 MG/DL) ACHIEVED    Collection Time: 09/24/21  7:56 AM   Result Value Ref Range    Magnesium 2.0 1.5 - 2.5 mg/dL   SARS-CoV-2 PCR (24 hour In-House): Collect NP swab in VTM    Collection Time: 09/24/21  8:20 AM    Specimen: Nasopharyngeal; Respirate   Result Value Ref Range    SARS-CoV-2 Source NP Swab     SARS-CoV-2 by PCR NotDetected    GRP  B STREP, BY PCR ([facility] BROTH)    Collection Time: 09/24/21  8:20 AM    Specimen: Vaginal; Genital   Result Value Ref Range    Strep Gp B DNA PCR Negative Negative   CBC WITHOUT DIFFERENTIAL    Collection Time: 09/24/21 10:40 AM   Result Value Ref Range    WBC 10.5 4.8 - 10.8 K/uL    RBC 4.35 4.20 - 5.40 M/uL    Hemoglobin 10.7 (L) 12.0 - 16.0 g/dL    Hematocrit 32.8 (L) 37.0 - 47.0 %    MCV 75.4 (L) 81.4 - 97.8 fL    MCH 24.6 (L) 27.0 - 33.0 pg    MCHC 32.6 (L) 33.6 - 35.0 g/dL    RDW 37.9 35.9 - 50.0 fL    Platelet Count 421 164 - 446 K/uL    MPV 9.7 9.0 - 12.9 fL   Comp Metabolic Panel    Collection Time: 09/24/21 10:40 AM   Result Value Ref Range    Sodium 135 135 - 145 mmol/L    Potassium 4.2 3.6 - 5.5 mmol/L    Chloride 103 96 - 112 mmol/L    Co2 17 (L) 20 - 33 mmol/L    Anion Gap 15.0 7.0 - 16.0    Glucose 141 (H) 65 - 99 mg/dL    Bun 5 (L) 8 - 22 mg/dL    Creatinine 0.50 0.50 - 1.40 mg/dL    Calcium 8.7 8.5 - 10.5 mg/dL    AST(SGOT) 74 (H) 12 - 45 U/L    ALT(SGPT) 114 (H) 2 - 50 U/L    Alkaline Phosphatase 129 (H) 30 - 99 U/L    Total Bilirubin 0.3 0.1 - 1.5 mg/dL    Albumin 4.1 3.2 - 4.9 g/dL    Total Protein 7.2 6.0 - 8.2 g/dL    Globulin 3.1 1.9 - 3.5 g/dL    A-G Ratio 1.3 g/dL   MAGNESIUM    Collection Time: 09/24/21 10:40 AM   Result Value Ref Range    Magnesium 4.3 (H) 1.5 - 2.5 mg/dL   ESTIMATED GFR    Collection Time: 09/24/21 10:40 AM   Result Value Ref Range    GFR If African American >60 >60 mL/min/1.73 m 2    GFR If Non African American >60 >60 mL/min/1.73 m 2   CBC WITHOUT DIFFERENTIAL    Collection Time: 09/24/21  3:50 PM   Result Value Ref Range    WBC 10.7 4.8 - 10.8 K/uL    RBC 4.05 (L) 4.20 - 5.40 M/uL    Hemoglobin 10.2 (L) 12.0 - 16.0 g/dL    Hematocrit 31.3 (L) 37.0 - 47.0 %    MCV 77.3 (L) 81.4 - 97.8 fL    MCH 25.2 (L) 27.0 - 33.0 pg    MCHC 32.6 (L) 33.6 - 35.0 g/dL    RDW 39.6 35.9 - 50.0 fL    Platelet Count 421 164 - 446 K/uL    MPV 9.8 9.0 - 12.9 fL   Comp Metabolic Panel     Collection Time: 09/24/21  3:50 PM   Result Value Ref Range    Sodium 135 135 - 145 mmol/L    Potassium 4.0 3.6 - 5.5 mmol/L    Chloride 105 96 - 112 mmol/L    Co2 20 20 - 33 mmol/L    Anion Gap 10.0 7.0 - 16.0    Glucose 118 (H) 65 - 99 mg/dL    Bun 4 (L) 8 - 22 mg/dL    Creatinine 0.56 0.50 - 1.40 mg/dL    Calcium 7.8 (L) 8.5 - 10.5 mg/dL    AST(SGOT) 80 (H) 12 - 45 U/L    ALT(SGPT) 119 (H) 2 - 50 U/L    Alkaline Phosphatase 120 (H) 30 - 99 U/L    Total Bilirubin 0.2 0.1 - 1.5 mg/dL    Albumin 3.5 3.2 - 4.9 g/dL    Total Protein 6.7 6.0 - 8.2 g/dL    Globulin 3.2 1.9 - 3.5 g/dL    A-G Ratio 1.1 g/dL   MAGNESIUM    Collection Time: 09/24/21  3:50 PM   Result Value Ref Range    Magnesium 5.4 (HH) 1.5 - 2.5 mg/dL   ESTIMATED GFR    Collection Time: 09/24/21  3:50 PM   Result Value Ref Range    GFR If African American >60 >60 mL/min/1.73 m 2    GFR If Non African American >60 >60 mL/min/1.73 m 2   URIC ACID    Collection Time: 09/24/21 10:02 PM   Result Value Ref Range    Uric Acid 3.1 1.9 - 8.2 mg/dL   LDH    Collection Time: 09/24/21 10:02 PM   Result Value Ref Range    LDH Total 279 (H) 107 - 266 U/L   MAGNESIUM    Collection Time: 09/24/21 10:02 PM   Result Value Ref Range    Magnesium 5.5 (HH) 1.5 - 2.5 mg/dL   CBC WITHOUT DIFFERENTIAL    Collection Time: 09/25/21  4:09 AM   Result Value Ref Range    WBC 10.9 (H) 4.8 - 10.8 K/uL    RBC 3.85 (L) 4.20 - 5.40 M/uL    Hemoglobin 9.7 (L) 12.0 - 16.0 g/dL    Hematocrit 29.7 (L) 37.0 - 47.0 %    MCV 77.1 (L) 81.4 - 97.8 fL    MCH 25.2 (L) 27.0 - 33.0 pg    MCHC 32.7 (L) 33.6 - 35.0 g/dL    RDW 39.2 35.9 - 50.0 fL    Platelet Count 388 164 - 446 K/uL    MPV 10.1 9.0 - 12.9 fL   Comp Metabolic Panel    Collection Time: 09/25/21  4:09 AM   Result Value Ref Range    Sodium 134 (L) 135 - 145 mmol/L    Potassium 4.3 3.6 - 5.5 mmol/L    Chloride 102 96 - 112 mmol/L    Co2 20 20 - 33 mmol/L    Anion Gap 12.0 7.0 - 16.0    Glucose 154 (H) 65 - 99 mg/dL    Bun 5 (L) 8 - 22 mg/dL     Creatinine 0.66 0.50 - 1.40 mg/dL    Calcium 7.1 (L) 8.5 - 10.5 mg/dL    AST(SGOT) 134 (H) 12 - 45 U/L    ALT(SGPT) 184 (H) 2 - 50 U/L    Alkaline Phosphatase 115 (H) 30 - 99 U/L    Total Bilirubin 0.2 0.1 - 1.5 mg/dL    Albumin 3.3 3.2 - 4.9 g/dL    Total Protein 6.4 6.0 - 8.2 g/dL    Globulin 3.1 1.9 - 3.5 g/dL    A-G Ratio 1.1 g/dL   MAGNESIUM    Collection Time: 09/25/21  4:09 AM   Result Value Ref Range    Magnesium 4.6 (H) 1.5 - 2.5 mg/dL   ESTIMATED GFR    Collection Time: 09/25/21  4:09 AM   Result Value Ref Range    GFR If African American >60 >60 mL/min/1.73 m 2    GFR If Non African American >60 >60 mL/min/1.73 m 2   AMYLASE    Collection Time: 09/25/21  4:09 AM   Result Value Ref Range    Amylase 40 20 - 103 U/L   LIPASE    Collection Time: 09/25/21  4:09 AM   Result Value Ref Range    Lipase 25 11 - 82 U/L   URINETOTAL PROTEIN 24 HR    Collection Time: 09/25/21  8:30 AM   Result Value Ref Range    Total Volume, Urine 3000ml mL    Total Protein, Urine 4.0 0.0 - 15.0 mg/dL    Total Protein, 24 Hour Urine 120.0 30.0 - 150.0 mg/24 Hr   CBC WITHOUT DIFFERENTIAL    Collection Time: 09/25/21 11:59 AM   Result Value Ref Range    WBC 10.8 4.8 - 10.8 K/uL    RBC 4.16 (L) 4.20 - 5.40 M/uL    Hemoglobin 10.5 (L) 12.0 - 16.0 g/dL    Hematocrit 31.8 (L) 37.0 - 47.0 %    MCV 76.4 (L) 81.4 - 97.8 fL    MCH 25.2 (L) 27.0 - 33.0 pg    MCHC 33.0 (L) 33.6 - 35.0 g/dL    RDW 39.3 35.9 - 50.0 fL    Platelet Count 409 164 - 446 K/uL    MPV 9.8 9.0 - 12.9 fL   Comp Metabolic Panel    Collection Time: 09/25/21 11:59 AM   Result Value Ref Range    Sodium 138 135 - 145 mmol/L    Potassium 3.9 3.6 - 5.5 mmol/L    Chloride 105 96 - 112 mmol/L    Co2 19 (L) 20 - 33 mmol/L    Anion Gap 14.0 7.0 - 16.0    Glucose 155 (H) 65 - 99 mg/dL    Bun 6 (L) 8 - 22 mg/dL    Creatinine 0.66 0.50 - 1.40 mg/dL    Calcium 7.8 (L) 8.5 - 10.5 mg/dL    AST(SGOT) 149 (H) 12 - 45 U/L    ALT(SGPT) 225 (H) 2 - 50 U/L    Alkaline Phosphatase 124 (H) 30 -  99 U/L    Total Bilirubin 0.2 0.1 - 1.5 mg/dL    Albumin 3.7 3.2 - 4.9 g/dL    Total Protein 7.1 6.0 - 8.2 g/dL    Globulin 3.4 1.9 - 3.5 g/dL    A-G Ratio 1.1 g/dL   ESTIMATED GFR    Collection Time: 09/25/21 11:59 AM   Result Value Ref Range    GFR If African American >60 >60 mL/min/1.73 m 2    GFR If Non African American >60 >60 mL/min/1.73 m 2   Comp Metabolic Panel    Collection Time: 09/26/21  3:07 AM   Result Value Ref Range    Sodium 134 (L) 135 - 145 mmol/L    Potassium 3.8 3.6 - 5.5 mmol/L    Chloride 103 96 - 112 mmol/L    Co2 19 (L) 20 - 33 mmol/L    Anion Gap 12.0 7.0 - 16.0    Glucose 114 (H) 65 - 99 mg/dL    Bun 7 (L) 8 - 22 mg/dL    Creatinine 0.57 0.50 - 1.40 mg/dL    Calcium 8.5 8.5 - 10.5 mg/dL    AST(SGOT) 112 (H) 12 - 45 U/L    ALT(SGPT) 212 (H) 2 - 50 U/L    Alkaline Phosphatase 109 (H) 30 - 99 U/L    Total Bilirubin 0.2 0.1 - 1.5 mg/dL    Albumin 3.8 3.2 - 4.9 g/dL    Total Protein 6.1 6.0 - 8.2 g/dL    Globulin 2.3 1.9 - 3.5 g/dL    A-G Ratio 1.7 g/dL   CBC WITHOUT DIFFERENTIAL    Collection Time: 09/26/21  3:07 AM   Result Value Ref Range    WBC 11.3 (H) 4.8 - 10.8 K/uL    RBC 3.79 (L) 4.20 - 5.40 M/uL    Hemoglobin 9.2 (L) 12.0 - 16.0 g/dL    Hematocrit 29.0 (L) 37.0 - 47.0 %    MCV 76.5 (L) 81.4 - 97.8 fL    MCH 24.3 (L) 27.0 - 33.0 pg    MCHC 31.7 (L) 33.6 - 35.0 g/dL    RDW 39.1 35.9 - 50.0 fL    Platelet Count 374 164 - 446 K/uL    MPV 10.1 9.0 - 12.9 fL   HEMOGLOBINOPATHY PROFILE    Collection Time: 09/26/21  3:07 AM   Result Value Ref Range    Hemoglobin A1 96.2 95.0 - 97.9 %    Hemoglobin A2 3.1 2.0 - 3.5 %    Hemoglobin F 0.7 0.0 - 2.1 %    Hemoglobin S 0.0 0.0 - 0.0 %    Hemaglobin C 0.0 0.0 - 0.0 %    Hemoglobin E 0.0 0.0 - 0.0 %    Hemoglobin Other 0.0 0.0 - 0.0 %    Hemoglobin Eval See Note     Hemoglobin,Capillary Electrophoresis Not Performed     Hgb Solubility Not Performed    IRON/TOTAL IRON BIND    Collection Time: 09/26/21  3:07 AM   Result Value Ref Range    Iron 24 (L)  40 - 170 ug/dL    Total Iron Binding 451 (H) 250 - 450 ug/dL    Unsat Iron Binding 427 (H) 110 - 370 ug/dL    % Saturation 5 (L) 15 - 55 %   ESTIMATED GFR    Collection Time: 21  3:07 AM   Result Value Ref Range    GFR If African American >60 >60 mL/min/1.73 m 2    GFR If Non African American >60 >60 mL/min/1.73 m 2       A/P:  26 y.o.  at 33w2d presents for routine obstetric follow-up.  Patient symptoms are stable and not worsening.  She has migraines which are controlled with Fioricet.  Patient had elevated LFTs and ultrasound shows gallstones and patient is on a low-fat diet.  Patient has gestational hypertension and her blood pressures are currently controlled with labetalol 200 mg twice daily patient -will continue blood pressure medication.  Induction of labor at 37 weeks planned and sent to .  Referral to Dr. Martin for follow-up placed and patient was informed of this and contact information for Dr. Martin was given  Size equals dates   Encounter Diagnoses   Name Primary?   • High-risk pregnancy supervision, third trimester    • Gestational hypertension, third trimester    • Hypertension complicating pregnancy in third trimester    • Elevated LFTs    • Intractable migraine without status migrainosus, unspecified migraine type    • Gallstones          1.  Continue prenatal vitamins.  2.  Fetal kick counts daily reviewed.  3.  Exercise at least 30 minutes daily.  4.  Drink at least 2L of water daily  5.  Pregnancy and  labor precautions educated.  6.  Follow-up in 1 week with MD  7.  GBS culture planned at 36 weeks  8.  2 times weekly NST recommended until delivery

## 2021-10-07 ENCOUNTER — ROUTINE PRENATAL (OUTPATIENT)
Dept: OBGYN | Facility: CLINIC | Age: 26
End: 2021-10-07
Payer: MEDICAID

## 2021-10-07 VITALS — BODY MASS INDEX: 31.01 KG/M2 | SYSTOLIC BLOOD PRESSURE: 123 MMHG | DIASTOLIC BLOOD PRESSURE: 72 MMHG | WEIGHT: 198 LBS

## 2021-10-07 DIAGNOSIS — R79.89 ELEVATED LFTS: ICD-10-CM

## 2021-10-07 DIAGNOSIS — O13.3 GESTATIONAL HYPERTENSION, THIRD TRIMESTER: ICD-10-CM

## 2021-10-07 DIAGNOSIS — O09.93 HIGH-RISK PREGNANCY SUPERVISION, THIRD TRIMESTER: ICD-10-CM

## 2021-10-07 LAB
NST ACOUSTIC STIMULATION: NORMAL
NST ACTION NECESSARY: NORMAL
NST ASSESSMENT: NORMAL
NST BASELINE: NORMAL
NST INDICATIONS: NORMAL
NST OTHER DATA: NORMAL
NST READ BY: NORMAL
NST RETURN: NORMAL
NST UTERINE ACTIVITY: NORMAL

## 2021-10-07 PROCEDURE — 90040 PR PRENATAL FOLLOW UP: CPT | Performed by: OBSTETRICS & GYNECOLOGY

## 2021-10-07 ASSESSMENT — FIBROSIS 4 INDEX: FIB4 SCORE: 0.49

## 2021-10-07 NOTE — PROGRESS NOTES
Pt presents for follow up.  She is taking Fioricet for her headaches, which helps.  She has off and on vision changes that is not new or worse for her.  She does not have RUQ pain or any gallbladder issues. Does not endorse swelling changes.      Vitals:    10/07/21 1411   BP: 123/72     NAD  REsp LCTAB  Abd soft NT, no RUQ pain, gravid  Ext: trace edema, +2/4 DTR    NST reactive      Explained I do not know exactly how best to manage her as she has lots of concerning signs.  She has elevated LFTs that were deemed due to gallstones.  She does not have symptomatic gallstones currently so I would expect those numbers to be improved.  Her labs have not been repeated in 2 weeks and were ordered stat today.     Her headaches have not changed for her.  Again, another great concern as she is needing to take fioricet to manage the pain.  Her vision changes would normally be related to vascular changes concerning for preeclampsia, but, again, this was cleared by perinatology as due to her migraines and not a current problem.      She is taking labetalol 200mg BID and could mask some portion of elevated Bps, but if severe preeclampsia were present I would imagine this would not entirely help.  For this reason, I am comfortable sending her for stat outpatient labs.  She is to take her fioricet immediately when she gets home.  If her h/a does not improve, she will need to go to L&D.  If her vision changes do not improve, she needs to go to L&D.  Any signs of not feeling well or RUQ pain, proceed to labor and delivery.     She seemed to understand and agree with my concerns and will seek medical attention if any of the above occur.     Luly Toledo D.O.

## 2021-10-09 ENCOUNTER — HOSPITAL ENCOUNTER (EMERGENCY)
Facility: MEDICAL CENTER | Age: 26
End: 2021-10-09
Attending: OBSTETRICS & GYNECOLOGY | Admitting: OBSTETRICS & GYNECOLOGY
Payer: MEDICAID

## 2021-10-09 VITALS
BODY MASS INDEX: 31.08 KG/M2 | OXYGEN SATURATION: 98 % | DIASTOLIC BLOOD PRESSURE: 69 MMHG | HEIGHT: 67 IN | SYSTOLIC BLOOD PRESSURE: 129 MMHG | HEART RATE: 114 BPM | WEIGHT: 198 LBS | TEMPERATURE: 97.4 F | RESPIRATION RATE: 15 BRPM

## 2021-10-09 LAB
ALBUMIN SERPL BCP-MCNC: 3.5 G/DL (ref 3.2–4.9)
ALBUMIN/GLOB SERPL: 1 G/DL
ALP SERPL-CCNC: 144 U/L (ref 30–99)
ALT SERPL-CCNC: 73 U/L (ref 2–50)
AMYLASE SERPL-CCNC: 66 U/L (ref 20–103)
ANION GAP SERPL CALC-SCNC: 13 MMOL/L (ref 7–16)
APPEARANCE UR: CLEAR
AST SERPL-CCNC: 41 U/L (ref 12–45)
BILIRUB SERPL-MCNC: 0.2 MG/DL (ref 0.1–1.5)
BILIRUB UR QL STRIP.AUTO: NEGATIVE
BUN SERPL-MCNC: 6 MG/DL (ref 8–22)
CALCIUM SERPL-MCNC: 9.5 MG/DL (ref 8.5–10.5)
CHLORIDE SERPL-SCNC: 106 MMOL/L (ref 96–112)
CO2 SERPL-SCNC: 19 MMOL/L (ref 20–33)
COLOR UR: YELLOW
CREAT SERPL-MCNC: 0.62 MG/DL (ref 0.5–1.4)
CREAT UR-MCNC: 138.58 MG/DL
ERYTHROCYTE [DISTWIDTH] IN BLOOD BY AUTOMATED COUNT: 37 FL (ref 35.9–50)
GLOBULIN SER CALC-MCNC: 3.4 G/DL (ref 1.9–3.5)
GLUCOSE SERPL-MCNC: 114 MG/DL (ref 65–99)
GLUCOSE UR STRIP.AUTO-MCNC: NEGATIVE MG/DL
HAV IGM SERPL QL IA: NORMAL
HBV CORE IGM SER QL: NORMAL
HBV SURFACE AG SER QL: NORMAL
HCT VFR BLD AUTO: 32.7 % (ref 37–47)
HCV AB SER QL: NORMAL
HGB BLD-MCNC: 10.6 G/DL (ref 12–16)
HIV 1+2 AB+HIV1 P24 AG SERPL QL IA: NORMAL
KETONES UR STRIP.AUTO-MCNC: NEGATIVE MG/DL
LDH SERPL L TO P-CCNC: 179 U/L (ref 107–266)
LEUKOCYTE ESTERASE UR QL STRIP.AUTO: NEGATIVE
LIPASE SERPL-CCNC: 34 U/L (ref 11–82)
LIPASE SERPL-CCNC: 34 U/L (ref 11–82)
MCH RBC QN AUTO: 24.3 PG (ref 27–33)
MCHC RBC AUTO-ENTMCNC: 32.4 G/DL (ref 33.6–35)
MCV RBC AUTO: 75 FL (ref 81.4–97.8)
MICRO URNS: NORMAL
NITRITE UR QL STRIP.AUTO: NEGATIVE
PH UR STRIP.AUTO: 6.5 [PH] (ref 5–8)
PLATELET # BLD AUTO: 346 K/UL (ref 164–446)
PMV BLD AUTO: 10 FL (ref 9–12.9)
POTASSIUM SERPL-SCNC: 4 MMOL/L (ref 3.6–5.5)
PROT SERPL-MCNC: 6.9 G/DL (ref 6–8.2)
PROT UR QL STRIP: NEGATIVE MG/DL
PROT UR-MCNC: 10 MG/DL (ref 0–15)
PROT/CREAT UR: 72 MG/G (ref 10–107)
RBC # BLD AUTO: 4.36 M/UL (ref 4.2–5.4)
RBC UR QL AUTO: NEGATIVE
SODIUM SERPL-SCNC: 138 MMOL/L (ref 135–145)
SP GR UR STRIP.AUTO: 1.02
UROBILINOGEN UR STRIP.AUTO-MCNC: 1 MG/DL
WBC # BLD AUTO: 7.8 K/UL (ref 4.8–10.8)

## 2021-10-09 PROCEDURE — 80053 COMPREHEN METABOLIC PANEL: CPT

## 2021-10-09 PROCEDURE — 84156 ASSAY OF PROTEIN URINE: CPT

## 2021-10-09 PROCEDURE — 59025 FETAL NON-STRESS TEST: CPT

## 2021-10-09 PROCEDURE — 81003 URINALYSIS AUTO W/O SCOPE: CPT

## 2021-10-09 PROCEDURE — 59025 FETAL NON-STRESS TEST: CPT | Mod: 26 | Performed by: OBSTETRICS & GYNECOLOGY

## 2021-10-09 PROCEDURE — 82150 ASSAY OF AMYLASE: CPT

## 2021-10-09 PROCEDURE — 83690 ASSAY OF LIPASE: CPT

## 2021-10-09 PROCEDURE — 82570 ASSAY OF URINE CREATININE: CPT

## 2021-10-09 PROCEDURE — 85027 COMPLETE CBC AUTOMATED: CPT

## 2021-10-09 PROCEDURE — 302449 STATCHG TRIAGE ONLY (STATISTIC)

## 2021-10-09 PROCEDURE — 80074 ACUTE HEPATITIS PANEL: CPT

## 2021-10-09 PROCEDURE — 36415 COLL VENOUS BLD VENIPUNCTURE: CPT

## 2021-10-09 PROCEDURE — 83615 LACTATE (LD) (LDH) ENZYME: CPT

## 2021-10-09 PROCEDURE — 99283 EMERGENCY DEPT VISIT LOW MDM: CPT | Mod: 25 | Performed by: OBSTETRICS & GYNECOLOGY

## 2021-10-09 ASSESSMENT — FIBROSIS 4 INDEX: FIB4 SCORE: 0.49

## 2021-10-10 NOTE — DISCHARGE INSTRUCTIONS
STOP taking Labetalol PO entirely. Start taking Magnesium supplement 400 mg PO once a day. Start taking Ferrous Sulfate 325 mg 2-3 times a day with food (consider orange juice). Start taking stool softeners once a day with iron supplement.     General Instructions:  · If you think you are in labor, time contractions (lying on your left side) from the beginning of one contraction to the beginning of the next contraction for at least one hour.  · Increase fluid intake: you should consume 10-12 8 oz glasses of non-caffeinated fluid per day.  · Report any pressure or burning on urination to your physician.  · Monitor fetal movement: If you notice an absence or decrease in fetal movement, drink a large glass of water and rest on your side.  If there is no increase in movement, call your physician or go to the hospital for further evaluation.  · Report any sudden, sharp abdominal pain.  · Report any bleeding.  Spotting or pinkish discharge is normal after vaginal exam.  You may also spot after sexual intercourse.    Pre-term Labor (<37 weeks):  Call your physician or return to the hospital if:  · You have painless regular contractions more than 4 in one hour.  · Your water breaks (remember time and color).  · You have menstrual-like cramps, a low dull backache or pressure in your pelvis or back.  · Your baby does not move enough to complete the daily kick count (10 movements in 2 hours).  · Your baby moves much less often than on the days before or you have not felt your baby move all day.  · Please review the MEDICATION LIST section of your AFTER VISIT SUMMARY document.  · Take your medication as prescribed      Preeclampsia and Eclampsia  Preeclampsia is a serious condition that may develop during pregnancy. This condition causes high blood pressure and increased protein in your urine along with other symptoms, such as headaches and vision changes. These symptoms may develop as the condition gets worse. Preeclampsia  may occur at 20 weeks of pregnancy or later.  Diagnosing and treating preeclampsia early is very important. If not treated early, it can cause serious problems for you and your baby. One problem it can lead to is eclampsia. Eclampsia is a condition that causes muscle jerking or shaking (convulsions or seizures) and other serious problems for the mother. During pregnancy, delivering your baby may be the best treatment for preeclampsia or eclampsia. For most women, preeclampsia and eclampsia symptoms go away after giving birth.  In rare cases, a woman may develop preeclampsia after giving birth (postpartum preeclampsia). This usually occurs within 48 hours after childbirth but may occur up to 6 weeks after giving birth.  What are the causes?  The cause of preeclampsia is not known.  What increases the risk?  The following risk factors make you more likely to develop preeclampsia:  · Being pregnant for the first time.  · Having had preeclampsia during a past pregnancy.  · Having a family history of preeclampsia.  · Having high blood pressure.  · Being pregnant with more than one baby.  · Being 35 or older.  · Being -American.  · Having kidney disease or diabetes.  · Having medical conditions such as lupus or blood diseases.  · Being very overweight (obese).  What are the signs or symptoms?  The most common symptoms are:  · Severe headaches.  · Vision problems, such as blurred or double vision.  · Abdominal pain, especially upper abdominal pain.  Other symptoms that may develop as the condition gets worse include:  · Sudden weight gain.  · Sudden swelling of the hands, face, legs, and feet.  · Severe nausea and vomiting.  · Numbness in the face, arms, legs, and feet.  · Dizziness.  · Urinating less than usual.  · Slurred speech.  · Convulsions or seizures.  How is this diagnosed?  There are no screening tests for preeclampsia. Your health care provider will ask you about symptoms and check for signs of  preeclampsia during your prenatal visits. You may also have tests that include:  · Checking your blood pressure.  · Urine tests to check for protein. Your health care provider will check for this at every prenatal visit.  · Blood tests.  · Monitoring your baby's heart rate.  · Ultrasound.  How is this treated?  You and your health care provider will determine the treatment approach that is best for you. Treatment may include:  · Having more frequent prenatal exams to check for signs of preeclampsia, if you have an increased risk for preeclampsia.  · Medicine to lower your blood pressure.  · Staying in the hospital, if your condition is severe. There, treatment will focus on controlling your blood pressure and the amount of fluids in your body (fluid retention).  · Taking medicine (magnesium sulfate) to prevent seizures. This may be given as an injection or through an IV.  · Taking a low-dose aspirin during your pregnancy.  · Delivering your baby early. You may have your labor started with medicine (induced), or you may have a  delivery.  Follow these instructions at home:  Eating and drinking    · Drink enough fluid to keep your urine pale yellow.  · Avoid caffeine.  Lifestyle  · Do not use any products that contain nicotine or tobacco, such as cigarettes and e-cigarettes. If you need help quitting, ask your health care provider.  · Do not use alcohol or drugs.  · Avoid stress as much as possible. Rest and get plenty of sleep.  General instructions  · Take over-the-counter and prescription medicines only as told by your health care provider.  · When lying down, lie on your left side. This keeps pressure off your major blood vessels.  · When sitting or lying down, raise (elevate) your feet. Try putting some pillows underneath your lower legs.  · Exercise regularly. Ask your health care provider what kinds of exercise are best for you.  · Keep all follow-up and prenatal visits as told by your health care  provider. This is important.  How is this prevented?  There is no known way of preventing preeclampsia or eclampsia from developing. However, to lower your risk of complications and detect problems early:  · Get regular prenatal care. Your health care provider may be able to diagnose and treat the condition early.  · Maintain a healthy weight. Ask your health care provider for help managing weight gain during pregnancy.  · Work with your health care provider to manage any long-term (chronic) health conditions you have, such as diabetes or kidney problems.  · You may have tests of your blood pressure and kidney function after giving birth.  · Your health care provider may have you take low-dose aspirin during your next pregnancy.  Contact a health care provider if:  · You have symptoms that your health care provider told you may require more treatment or monitoring, such as:  ? Headaches.  ? Nausea or vomiting.  ? Abdominal pain.  ? Dizziness.  ? Light-headedness.  Get help right away if:  · You have severe:  ? Abdominal pain.  ? Headaches that do not get better.  ? Dizziness.  ? Vision problems.  ? Confusion.  ? Nausea or vomiting.  · You have any of the following:  ? A seizure.  ? Sudden, rapid weight gain.  ? Sudden swelling in your hands, ankles, or face.  ? Trouble moving any part of your body.  ? Numbness in any part of your body.  ? Trouble speaking.  ? Abnormal bleeding.  · You faint.  Summary  · Preeclampsia is a serious condition that may develop during pregnancy.  · This condition causes high blood pressure and increased protein in your urine along with other symptoms, such as headaches and vision changes.  · Diagnosing and treating preeclampsia early is very important. If not treated early, it can cause serious problems for you and your baby.  · Get help right away if you have symptoms that your health care provider told you to watch for.  This information is not intended to replace advice given to you by  your health care provider. Make sure you discuss any questions you have with your health care provider.  Document Released: 12/15/2001 Document Revised: 08/20/2019 Document Reviewed: 07/24/2017  Elsevier Patient Education © 2020 [employer]      Other Instructions:    STOP taking Labetalol PO entirely. Start taking Magnesium supplement 400 mg PO once a day. Start taking Ferrous Sulfate 325 mg 2-3 times a day with food (consider orange juice). Start taking stool softeners once a day with iron supplement.     Please carefully review your entire AFTER VISIT SUMMARY document for all discharge instructions.

## 2021-10-10 NOTE — ED PROVIDER NOTES
"OB ED EVALUATION NOTE    SUBJECTIVE:  Leila Navarro is a 26 y.o.,  at 34w3d who presents to Ob Ed for continued concerns over her symptoms which may or may not be indicating preeclampsia.  She states she was seen in the office on Thursday and the doctor she saw gave her strict precautions to return to triage for unresolving headaches or visual changes.  For the most part, pt does not feel bad at the moment but she is here on account of her mother who has also been very concerned about her.  She had repeat serum/urine labs ordered which she completed at [facility] on Friday. She received 1 result online which showed \"High protein\" in her urine and so her mom encouraged her to return to triage.  Pt was admitted previously for 5 days on  for similar concerns.  She started her OB care with [facility] but then transferred to [facility] at 31wks due to inconsistencies in care at [facility]. She unfortunately has experienced similar confusion with seeing multiple doctors at [facility].    She reports she does not normally get headaches in general and did not have headaches until about 1 month ago.  They are frequent, sometimes dull and sometimes not.  She has been given an Rx for fiorcet which hasn't helped much. The visual changes she reports are \"floaters\" and decrease in visual acuity but denies loss in field of vision or blurry vision.  She denies abd or RUQ pain.   She has been started and stopped on labetalol several times over the past month between care at [facility] and [facility].  She reports all this initially started with getting dizzy and falling at home around 30wks which began her multiple evaluations which brought about blood pressure concerns.    In addition, On prior admission, LFTs were noted to be high and gallbladder US revealed cholelithiasis but she has been asymptomatic      She denies vaginal bleeding, leakage of fluid, or contractions. She states the baby is moving.     I personally reviewed the past medical and " surgical histories, as well as the problem list.    OBJECTIVE:  Vital Signs:   Vitals:    10/09/21 1950 10/09/21 2005 10/09/21 2020 10/09/21 2035   BP: 129/92 126/83 125/89 129/69   Pulse: 100 (!) 104 93 (!) 114   Resp:       Temp:       TempSrc:       SpO2:       Weight:       Height:           OB PE:  GEN: appears well, NAD  HEENT: thyroid appears mildly enlarged; no facial edema noted  Respiratory: normal effort  Abd: soft, NT, gravid. NO RUQ or Epigastric tenderness   Ext: no edema    NST read: 135, reactive  [place]: no contractions    LABS / IMAGING:  Lab Results   Component Value Date/Time    SODIUM 138 10/09/2021 08:42 PM    POTASSIUM 4.0 10/09/2021 08:42 PM    CHLORIDE 106 10/09/2021 08:42 PM    CO2 19 (L) 10/09/2021 08:42 PM    GLUCOSE 114 (H) 10/09/2021 08:42 PM    BUN 6 (L) 10/09/2021 08:42 PM    CREATININE 0.62 10/09/2021 08:42 PM      Recent Labs     10/09/21  2042   ASTSGOT 41   ALTSGPT 73*   TBILIRUBIN 0.2   GLOBULIN 3.4     Recent Labs     10/09/21  2042   WBC 7.8   RBC 4.36   HEMOGLOBIN 10.6*   HEMATOCRIT 32.7*   MCV 75.0*   MCH 24.3*   RDW 37.0   PLATELETCT 346   MPV 10.0     Amylase and Lipase wnl  Acute hep panel negative    UPCr 72      ASSESSMENT AND PLAN:  26 y.o.  at 34w3d   1. gHTN   - extensive review of chart and patient histories, visits and blood pressures   - she DOES meet criteria for gHTN as she has had elevated pressures; HOWEVER, these pressures have ALWAYS been mild range only and curiously have also been intermixed with very normal and even LOW pressures.   - we discussed extensively that in my impression she DOES NOT have preeclampsia.  Her urine protein has always been low and despite her other findings and her symptoms, it just doesn't seem to fit to me.   - however, I also find it very challenging to dx worsening gHTN and/or developing preeclampsia while pt is on anti-hypertensives.  While true preeclampsia will often break through medication, I am not sure she has a good  "indication to be on medication as we typically do not treat mild range pressures. I am also concerned about the labile nature of her pressure may indicate that elevated pressures are compensatory to low pressures.  Given several of her presentations followed episodes of dizziness and near syncope, it is reasonable to consider that she may have orthostatic hypotension.  Would consider cardiology referral if this persists past pregnancy but I doubt referral will or needs to occur in her remaining weeks of pregnancy   - today, I recommend she STOP labetalol.  She reports she has a cuff at home and has been taking her bp and it has been good (though she has been on meds). Encouraged her to keep a bp log and we will continue to check bps at her twice weekly OV/NST visits.  IF PERSISTENT SEVERE PRESSURES DEVELOP, would recommend delivery now that she is >34wks.  If she remains mild, she may be delivered between 37-39wks per recs for gHTN.  Delivery is currently already scheduled for 37wks which is reasonable in her case    2. Elevated LFTs   - trending down now.  41/73 today down from 112/212 on 9/26   - normal acute hep panel and amylase/lipase   - may need outpt GI referral for gallstones.     - do not think elevated LFTs is a current s/sx of preeclampsia    3. Anemia   - 10.6/32.7 (improvement from 9.2/29 on 9/26)   - instructed pt to begin OTC iron supplements BID with stool softener until delivery   - plt normal    4. Headaches, visual changes   - reviewed normal/common visual changes in pregnancy physiology which does include decreased visual acuity and floaters.  Discussed that spots in vision that are more concerning for preeclampsia include loss of spots of visual field, \"stars or electric-like spots\" or VERY blurry vision.     - has tried fiorcet and caffeine for headaches.  Discussed that labile bps or anemia may also be contributing to headaches   - recommend magnesium 400mg daily   - unclear if headache alone " is truly a s/sx of preeclampsia for her given normal bps and absence of proteinuria    Overall, I discussed with the patient that while her clinical picture has been complex and unclear, my current impression is that she is not preeclamptic.  She is likely only 3 weeks from delivery unless severe range blood pressures develop off medication. May need to consider outpt referral to cardiology and/or GI but I am unsure that these would happen soon enough to make a change in her clinical course.   I think with close follow up in office she is safe to discharge and continue pregnancy until at least 37wks if not further for stable blood pressures.      All questions from patient and her mother answered in detail. [employer] message sent to patient detailing results of today's labwork.    Total Time: 80 minutes spent in chart review, exam, counseling and documention      Alexa Villagran D.O.

## 2021-10-10 NOTE — PROGRESS NOTES
) Report received from Willis DAVIS RN, POC discussed, assumed care of patient at this time  ) Dr. Villagran at bedside to see patient  ) Dr. Villagran at bedside to see patient., labs getting ddrawn at this time. Orders received to discharge patient with following instructions: STOP taking Labetalol PO entirely. Start taking Magnesium supplement 400 mg PO once a day. Start taking Ferrous Sulfate 325 mg 2-3 times a day with food (consider orange juice). Start taking stool softeners once a day with iron supplement. Patient verbalizes understanding and agrees with POC.  ) Discharge instructions given including  labor precautions, UC's, ROM, VB, abn FM, when to return to L&D, f/u with Dr. Hodges on 10/15, pelvic rest and modified bedrest. Questions answered at length. Pt verbalized understanding and agreement with POC and discharge. Discharge instructions signed.   ) Patient ambulated out of unit in stable condition

## 2021-10-15 DIAGNOSIS — O13.3 GESTATIONAL HYPERTENSION, THIRD TRIMESTER: ICD-10-CM

## 2021-10-20 ENCOUNTER — NON-PROVIDER VISIT (OUTPATIENT)
Dept: OBGYN | Facility: CLINIC | Age: 26
End: 2021-10-20
Payer: MEDICAID

## 2021-10-20 ENCOUNTER — ROUTINE PRENATAL (OUTPATIENT)
Dept: OBGYN | Facility: CLINIC | Age: 26
End: 2021-10-20
Payer: MEDICAID

## 2021-10-20 VITALS — BODY MASS INDEX: 31.17 KG/M2 | WEIGHT: 199 LBS | DIASTOLIC BLOOD PRESSURE: 87 MMHG | SYSTOLIC BLOOD PRESSURE: 131 MMHG

## 2021-10-20 DIAGNOSIS — O13.3 GESTATIONAL HYPERTENSION, THIRD TRIMESTER: Primary | ICD-10-CM

## 2021-10-20 DIAGNOSIS — O09.93 HIGH-RISK PREGNANCY SUPERVISION, THIRD TRIMESTER: ICD-10-CM

## 2021-10-20 PROCEDURE — 90040 PR PRENATAL FOLLOW UP: CPT | Performed by: OBSTETRICS & GYNECOLOGY

## 2021-10-20 ASSESSMENT — FIBROSIS 4 INDEX: FIB4 SCORE: 0.33

## 2021-10-25 ENCOUNTER — HOSPITAL ENCOUNTER (OUTPATIENT)
Dept: OBGYN | Facility: MEDICAL CENTER | Age: 26
End: 2021-10-25
Attending: OBSTETRICS & GYNECOLOGY
Payer: MEDICAID

## 2021-10-25 LAB
SARS-COV-2 RNA RESP QL NAA+PROBE: NOTDETECTED
SPECIMEN SOURCE: NORMAL

## 2021-10-25 PROCEDURE — U0005 INFEC AGEN DETEC AMPLI PROBE: HCPCS

## 2021-10-25 PROCEDURE — U0003 INFECTIOUS AGENT DETECTION BY NUCLEIC ACID (DNA OR RNA); SEVERE ACUTE RESPIRATORY SYNDROME CORONAVIRUS 2 (SARS-COV-2) (CORONAVIRUS DISEASE [COVID-19]), AMPLIFIED PROBE TECHNIQUE, MAKING USE OF HIGH THROUGHPUT TECHNOLOGIES AS DESCRIBED BY CMS-2020-01-R: HCPCS

## 2021-10-26 ENCOUNTER — ROUTINE PRENATAL (OUTPATIENT)
Dept: OBGYN | Facility: CLINIC | Age: 26
End: 2021-10-26
Payer: MEDICAID

## 2021-10-26 DIAGNOSIS — O13.3 GESTATIONAL HYPERTENSION, THIRD TRIMESTER: Primary | ICD-10-CM

## 2021-10-26 LAB
NST ACOUSTIC STIMULATION: NORMAL
NST ACTION NECESSARY: NORMAL
NST ASSESSMENT: NORMAL
NST BASELINE: 140
NST INDICATIONS: NORMAL
NST OTHER DATA: NORMAL
NST READ BY: NORMAL
NST RETURN: NORMAL
NST UTERINE ACTIVITY: NORMAL

## 2021-10-26 PROCEDURE — 59025 FETAL NON-STRESS TEST: CPT | Performed by: NURSE PRACTITIONER

## 2021-10-28 ENCOUNTER — HOSPITAL ENCOUNTER (OUTPATIENT)
Dept: OBGYN | Facility: MEDICAL CENTER | Age: 26
End: 2021-10-28

## 2021-10-28 ENCOUNTER — HOSPITAL ENCOUNTER (INPATIENT)
Facility: MEDICAL CENTER | Age: 26
LOS: 2 days | End: 2021-10-30
Attending: OBSTETRICS & GYNECOLOGY | Admitting: OBSTETRICS & GYNECOLOGY
Payer: MEDICAID

## 2021-10-28 ENCOUNTER — ANESTHESIA (OUTPATIENT)
Dept: ANESTHESIOLOGY | Facility: MEDICAL CENTER | Age: 26
End: 2021-10-28

## 2021-10-28 ENCOUNTER — ANESTHESIA EVENT (OUTPATIENT)
Dept: ANESTHESIOLOGY | Facility: MEDICAL CENTER | Age: 26
End: 2021-10-28

## 2021-10-28 DIAGNOSIS — O13.3 GESTATIONAL HYPERTENSION, THIRD TRIMESTER: ICD-10-CM

## 2021-10-28 LAB
ALBUMIN SERPL BCP-MCNC: 3.5 G/DL (ref 3.2–4.9)
ALBUMIN/GLOB SERPL: 1.1 G/DL
ALP SERPL-CCNC: 176 U/L (ref 30–99)
ALT SERPL-CCNC: 64 U/L (ref 2–50)
ANION GAP SERPL CALC-SCNC: 14 MMOL/L (ref 7–16)
AST SERPL-CCNC: 50 U/L (ref 12–45)
BASOPHILS # BLD AUTO: 0.3 % (ref 0–1.8)
BASOPHILS # BLD: 0.02 K/UL (ref 0–0.12)
BILIRUB SERPL-MCNC: 0.2 MG/DL (ref 0.1–1.5)
BUN SERPL-MCNC: 9 MG/DL (ref 8–22)
CALCIUM SERPL-MCNC: 9.1 MG/DL (ref 8.5–10.5)
CHLORIDE SERPL-SCNC: 105 MMOL/L (ref 96–112)
CO2 SERPL-SCNC: 18 MMOL/L (ref 20–33)
CREAT SERPL-MCNC: 0.86 MG/DL (ref 0.5–1.4)
CREAT UR-MCNC: 99.95 MG/DL
EOSINOPHIL # BLD AUTO: 0.04 K/UL (ref 0–0.51)
EOSINOPHIL NFR BLD: 0.6 % (ref 0–6.9)
ERYTHROCYTE [DISTWIDTH] IN BLOOD BY AUTOMATED COUNT: 37.6 FL (ref 35.9–50)
GLOBULIN SER CALC-MCNC: 3.3 G/DL (ref 1.9–3.5)
GLUCOSE SERPL-MCNC: 115 MG/DL (ref 65–99)
HCT VFR BLD AUTO: 31.5 % (ref 37–47)
HGB BLD-MCNC: 10.3 G/DL (ref 12–16)
HOLDING TUBE BB 8507: NORMAL
IMM GRANULOCYTES # BLD AUTO: 0.06 K/UL (ref 0–0.11)
IMM GRANULOCYTES NFR BLD AUTO: 0.9 % (ref 0–0.9)
LYMPHOCYTES # BLD AUTO: 1.85 K/UL (ref 1–4.8)
LYMPHOCYTES NFR BLD: 28.5 % (ref 22–41)
MCH RBC QN AUTO: 24.1 PG (ref 27–33)
MCHC RBC AUTO-ENTMCNC: 32.7 G/DL (ref 33.6–35)
MCV RBC AUTO: 73.8 FL (ref 81.4–97.8)
MONOCYTES # BLD AUTO: 0.42 K/UL (ref 0–0.85)
MONOCYTES NFR BLD AUTO: 6.5 % (ref 0–13.4)
NEUTROPHILS # BLD AUTO: 4.1 K/UL (ref 2–7.15)
NEUTROPHILS NFR BLD: 63.2 % (ref 44–72)
NRBC # BLD AUTO: 0 K/UL
NRBC BLD-RTO: 0 /100 WBC
PLATELET # BLD AUTO: 355 K/UL (ref 164–446)
PMV BLD AUTO: 10.3 FL (ref 9–12.9)
POTASSIUM SERPL-SCNC: 3.9 MMOL/L (ref 3.6–5.5)
PROT SERPL-MCNC: 6.8 G/DL (ref 6–8.2)
PROT UR-MCNC: 10 MG/DL (ref 0–15)
PROT/CREAT UR: 100 MG/G (ref 10–107)
RBC # BLD AUTO: 4.27 M/UL (ref 4.2–5.4)
SODIUM SERPL-SCNC: 137 MMOL/L (ref 135–145)
WBC # BLD AUTO: 6.5 K/UL (ref 4.8–10.8)

## 2021-10-28 PROCEDURE — 10H07YZ INSERTION OF OTHER DEVICE INTO PRODUCTS OF CONCEPTION, VIA NATURAL OR ARTIFICIAL OPENING: ICD-10-PCS | Performed by: OBSTETRICS & GYNECOLOGY

## 2021-10-28 PROCEDURE — 84156 ASSAY OF PROTEIN URINE: CPT

## 2021-10-28 PROCEDURE — 700111 HCHG RX REV CODE 636 W/ 250 OVERRIDE (IP): Performed by: ANESTHESIOLOGY

## 2021-10-28 PROCEDURE — 3E0P7VZ INTRODUCTION OF HORMONE INTO FEMALE REPRODUCTIVE, VIA NATURAL OR ARTIFICIAL OPENING: ICD-10-PCS | Performed by: OBSTETRICS & GYNECOLOGY

## 2021-10-28 PROCEDURE — 80053 COMPREHEN METABOLIC PANEL: CPT

## 2021-10-28 PROCEDURE — 700111 HCHG RX REV CODE 636 W/ 250 OVERRIDE (IP): Performed by: ADVANCED PRACTICE MIDWIFE

## 2021-10-28 PROCEDURE — 700101 HCHG RX REV CODE 250: Performed by: ANESTHESIOLOGY

## 2021-10-28 PROCEDURE — 304965 HCHG RECOVERY SERVICES

## 2021-10-28 PROCEDURE — A9270 NON-COVERED ITEM OR SERVICE: HCPCS | Performed by: NURSE PRACTITIONER

## 2021-10-28 PROCEDURE — 700102 HCHG RX REV CODE 250 W/ 637 OVERRIDE(OP): Performed by: NURSE PRACTITIONER

## 2021-10-28 PROCEDURE — 3E033VJ INTRODUCTION OF OTHER HORMONE INTO PERIPHERAL VEIN, PERCUTANEOUS APPROACH: ICD-10-PCS | Performed by: OBSTETRICS & GYNECOLOGY

## 2021-10-28 PROCEDURE — 59410 OBSTETRICAL CARE: CPT | Performed by: ADVANCED PRACTICE MIDWIFE

## 2021-10-28 PROCEDURE — 85025 COMPLETE CBC W/AUTO DIFF WBC: CPT

## 2021-10-28 PROCEDURE — 700111 HCHG RX REV CODE 636 W/ 250 OVERRIDE (IP)

## 2021-10-28 PROCEDURE — 59409 OBSTETRICAL CARE: CPT

## 2021-10-28 PROCEDURE — 700105 HCHG RX REV CODE 258: Performed by: ADVANCED PRACTICE MIDWIFE

## 2021-10-28 PROCEDURE — 82570 ASSAY OF URINE CREATININE: CPT

## 2021-10-28 PROCEDURE — 303615 HCHG EPIDURAL/SPINAL ANESTHESIA FOR LABOR

## 2021-10-28 PROCEDURE — 302449 STATCHG TRIAGE ONLY (STATISTIC)

## 2021-10-28 PROCEDURE — 10907ZC DRAINAGE OF AMNIOTIC FLUID, THERAPEUTIC FROM PRODUCTS OF CONCEPTION, VIA NATURAL OR ARTIFICIAL OPENING: ICD-10-PCS | Performed by: OBSTETRICS & GYNECOLOGY

## 2021-10-28 PROCEDURE — 770002 HCHG ROOM/CARE - OB PRIVATE (112)

## 2021-10-28 PROCEDURE — 700111 HCHG RX REV CODE 636 W/ 250 OVERRIDE (IP): Performed by: OBSTETRICS & GYNECOLOGY

## 2021-10-28 PROCEDURE — 700111 HCHG RX REV CODE 636 W/ 250 OVERRIDE (IP): Performed by: NURSE PRACTITIONER

## 2021-10-28 RX ORDER — ACETAMINOPHEN 500 MG
1000 TABLET ORAL EVERY 4 HOURS PRN
Status: DISCONTINUED | OUTPATIENT
Start: 2021-10-28 | End: 2021-10-30 | Stop reason: HOSPADM

## 2021-10-28 RX ORDER — ONDANSETRON 2 MG/ML
INJECTION INTRAMUSCULAR; INTRAVENOUS
Status: COMPLETED
Start: 2021-10-28 | End: 2021-10-28

## 2021-10-28 RX ORDER — ROPIVACAINE HYDROCHLORIDE 2 MG/ML
INJECTION, SOLUTION EPIDURAL; INFILTRATION; PERINEURAL CONTINUOUS
Status: DISCONTINUED | OUTPATIENT
Start: 2021-10-28 | End: 2021-10-29 | Stop reason: ALTCHOICE

## 2021-10-28 RX ORDER — ONDANSETRON 2 MG/ML
4 INJECTION INTRAMUSCULAR; INTRAVENOUS EVERY 6 HOURS PRN
Status: DISCONTINUED | OUTPATIENT
Start: 2021-10-28 | End: 2021-10-30 | Stop reason: HOSPADM

## 2021-10-28 RX ORDER — BUPIVACAINE HYDROCHLORIDE 2.5 MG/ML
INJECTION, SOLUTION EPIDURAL; INFILTRATION; INTRACAUDAL
Status: COMPLETED | OUTPATIENT
Start: 2021-10-28 | End: 2021-10-28

## 2021-10-28 RX ORDER — MISOPROSTOL 200 UG/1
800 TABLET ORAL
Status: DISCONTINUED | OUTPATIENT
Start: 2021-10-28 | End: 2021-10-29 | Stop reason: HOSPADM

## 2021-10-28 RX ORDER — ACETAMINOPHEN 500 MG
1000 TABLET ORAL EVERY 6 HOURS PRN
Status: DISCONTINUED | OUTPATIENT
Start: 2021-10-28 | End: 2021-10-30 | Stop reason: HOSPADM

## 2021-10-28 RX ORDER — IBUPROFEN 800 MG/1
800 TABLET ORAL 3 TIMES DAILY PRN
Status: DISCONTINUED | OUTPATIENT
Start: 2021-10-28 | End: 2021-10-28

## 2021-10-28 RX ORDER — SODIUM CHLORIDE, SODIUM LACTATE, POTASSIUM CHLORIDE, CALCIUM CHLORIDE 600; 310; 30; 20 MG/100ML; MG/100ML; MG/100ML; MG/100ML
INJECTION, SOLUTION INTRAVENOUS CONTINUOUS
Status: ACTIVE | OUTPATIENT
Start: 2021-10-28 | End: 2021-10-28

## 2021-10-28 RX ORDER — BUPIVACAINE HYDROCHLORIDE 2.5 MG/ML
INJECTION, SOLUTION EPIDURAL; INFILTRATION; INTRACAUDAL
Status: COMPLETED
Start: 2021-10-28 | End: 2021-10-28

## 2021-10-28 RX ORDER — LIDOCAINE HYDROCHLORIDE AND EPINEPHRINE 15; 5 MG/ML; UG/ML
INJECTION, SOLUTION EPIDURAL
Status: COMPLETED | OUTPATIENT
Start: 2021-10-28 | End: 2021-10-28

## 2021-10-28 RX ORDER — SODIUM CHLORIDE, SODIUM LACTATE, POTASSIUM CHLORIDE, AND CALCIUM CHLORIDE .6; .31; .03; .02 G/100ML; G/100ML; G/100ML; G/100ML
1000 INJECTION, SOLUTION INTRAVENOUS
Status: DISCONTINUED | OUTPATIENT
Start: 2021-10-28 | End: 2021-10-29 | Stop reason: HOSPADM

## 2021-10-28 RX ORDER — IBUPROFEN 800 MG/1
800 TABLET ORAL EVERY 8 HOURS PRN
Status: DISCONTINUED | OUTPATIENT
Start: 2021-10-28 | End: 2021-10-30 | Stop reason: HOSPADM

## 2021-10-28 RX ORDER — OXYTOCIN 10 [USP'U]/ML
10 INJECTION, SOLUTION INTRAMUSCULAR; INTRAVENOUS
Status: DISCONTINUED | OUTPATIENT
Start: 2021-10-28 | End: 2021-10-29 | Stop reason: HOSPADM

## 2021-10-28 RX ORDER — DEXTROSE, SODIUM CHLORIDE, SODIUM LACTATE, POTASSIUM CHLORIDE, AND CALCIUM CHLORIDE 5; .6; .31; .03; .02 G/100ML; G/100ML; G/100ML; G/100ML; G/100ML
INJECTION, SOLUTION INTRAVENOUS CONTINUOUS
Status: DISCONTINUED | OUTPATIENT
Start: 2021-10-28 | End: 2021-10-29 | Stop reason: ALTCHOICE

## 2021-10-28 RX ORDER — SODIUM CHLORIDE, SODIUM LACTATE, POTASSIUM CHLORIDE, AND CALCIUM CHLORIDE .6; .31; .03; .02 G/100ML; G/100ML; G/100ML; G/100ML
250 INJECTION, SOLUTION INTRAVENOUS PRN
Status: DISCONTINUED | OUTPATIENT
Start: 2021-10-28 | End: 2021-10-29 | Stop reason: HOSPADM

## 2021-10-28 RX ADMIN — MISOPROSTOL 25 MCG: 100 TABLET ORAL at 04:09

## 2021-10-28 RX ADMIN — OXYTOCIN 1 MILLI-UNITS/MIN: 10 INJECTION, SOLUTION INTRAMUSCULAR; INTRAVENOUS at 09:50

## 2021-10-28 RX ADMIN — ROPIVACAINE HYDROCHLORIDE: 2 INJECTION, SOLUTION EPIDURAL; INFILTRATION at 12:40

## 2021-10-28 RX ADMIN — OXYTOCIN 2000 ML/HR: 10 INJECTION, SOLUTION INTRAMUSCULAR; INTRAVENOUS at 22:20

## 2021-10-28 RX ADMIN — BUPIVACAINE HYDROCHLORIDE 3 ML: 2.5 INJECTION, SOLUTION EPIDURAL; INFILTRATION; INTRACAUDAL; PERINEURAL at 12:20

## 2021-10-28 RX ADMIN — ONDANSETRON 4 MG: 2 INJECTION INTRAMUSCULAR; INTRAVENOUS at 13:26

## 2021-10-28 RX ADMIN — FENTANYL CITRATE 100 MCG: 50 INJECTION, SOLUTION INTRAMUSCULAR; INTRAVENOUS at 12:20

## 2021-10-28 RX ADMIN — FAMOTIDINE 20 MG: 10 INJECTION INTRAVENOUS at 21:13

## 2021-10-28 RX ADMIN — OXYTOCIN 125 ML/HR: 10 INJECTION, SOLUTION INTRAMUSCULAR; INTRAVENOUS at 22:48

## 2021-10-28 RX ADMIN — LIDOCAINE HYDROCHLORIDE,EPINEPHRINE BITARTRATE 3 ML: 15; .005 INJECTION, SOLUTION EPIDURAL; INFILTRATION; INTRACAUDAL; PERINEURAL at 12:20

## 2021-10-28 RX ADMIN — ONDANSETRON 4 MG: 2 INJECTION INTRAMUSCULAR; INTRAVENOUS at 21:00

## 2021-10-28 RX ADMIN — SODIUM CHLORIDE, SODIUM LACTATE, POTASSIUM CHLORIDE, CALCIUM CHLORIDE AND DEXTROSE MONOHYDRATE: 5; 600; 310; 30; 20 INJECTION, SOLUTION INTRAVENOUS at 21:45

## 2021-10-28 ASSESSMENT — LIFESTYLE VARIABLES: EVER_SMOKED: NEVER

## 2021-10-28 ASSESSMENT — PATIENT HEALTH QUESTIONNAIRE - PHQ9
SUM OF ALL RESPONSES TO PHQ9 QUESTIONS 1 AND 2: 0
2. FEELING DOWN, DEPRESSED, IRRITABLE, OR HOPELESS: NOT AT ALL
1. LITTLE INTEREST OR PLEASURE IN DOING THINGS: NOT AT ALL

## 2021-10-28 ASSESSMENT — FIBROSIS 4 INDEX: FIB4 SCORE: 0.33

## 2021-10-28 ASSESSMENT — PAIN SCALES - GENERAL: PAINLEVEL: 0 - NO PAIN

## 2021-10-28 NOTE — PROGRESS NOTES
0700- Report received from JAMES Bello.     0824- SVE performed, 1-2/50/-3. Dr. Carty updated of findings.     1220- Dr. Rodríguez to bedside for epidural placement. Negative test dose, patient tolerated well. Patient monitored closely following procedure. Moya catheter placed.    1826- SVE performed, 3/90/-2.     7443- Report given to JAMES Bello. Patient updated on POC.

## 2021-10-28 NOTE — PROGRESS NOTES
"S: 39-year-old -0-0-1 at 37-1/7 weeks admitted for induction of labor due to gestational hypertension.  The patient denies headache.  She has occasional visual changes consisting of flashes in her peripheral vision.  She is starting to feel her contractions.    O:/86   Pulse (!) 128   Temp 36.4 °C (97.6 °F) (Temporal)   Resp 16   Ht 1.702 m (5' 7\")   Wt 89.8 kg (198 lb)   SpO2 96%      [place] - irregular contractions  EFM - 140s, moderate variability, positive accelerations  Cervix  - 1-2 cm/80%/-2    A/P: IUP at 37-1/7 weeks admitted for induction of labor secondary to gestational hypertension.  Blood pressures well controlled.  Cervix now favorable.  Fetal status reassuring.  Begin Pitocin induction of labor.  Anticipate vaginal delivery.  "

## 2021-10-28 NOTE — ANESTHESIA PROCEDURE NOTES
Epidural Block    Date/Time: 10/28/2021 12:20 PM  Performed by: Earlene Rodríguez M.D.  Authorized by: Earlene Rodríguez M.D.     Start Time:  10/28/2021 12:20 PM  End Time:  10/28/2021 12:36 PM  Reason for Block: at surgeon's request and post-op pain management    patient identified, IV checked, site marked, risks and benefits discussed, surgical consent, monitors and equipment checked, pre-op evaluation and timeout performed    Patient Position:  Sitting  Prep: ChloraPrep, patient draped and sterile technique    Monitoring:  Blood pressure, continuous pulse oximetry and heart rate  Location:  L4-L5  Injection Technique:  LUÍS air and LUÍS saline  Skin infiltration:  Lidocaine  Strength:  1%  Dose:  3ml  Needle Type:  Tuohy  Needle Gauge:  17 G  Needle Length:  3.5 in  Loss of resistance::  6.5  Catheter Size:  19 G  Catheter at Skin Depth:  12  Test Dose Result:  Negative   Difficult placement: first pass had brief paresthesia on R, readjusted needle only slightly but then has paresthesia on L. Removed needle completely and reinsterted, but patient continued to have both R and L paresthesias. Eventually got a good LUÍS and R-sided paresthesia with catheter insertion. Once needle was removed, paresthesia improved and was completely gone within 1-2 minutes.  No CSF on aspiration, nor pain/paresthsia on injection of test dose, but patient educated about possibility of IT cath or one-sided epidural. Will check back frequently

## 2021-10-28 NOTE — ANESTHESIA PREPROCEDURE EVALUATION
27yo  at 37 1/7 weeks, here in active labor after IOL for gHTN, requesting epidural for pain relief    Relevant Problems   NEURO   (positive) Intractable migraine without status migrainosus      CARDIAC   (positive) Intractable migraine without status migrainosus      Other   (positive) Elevated LFTs   (positive) Gestational hypertension, third trimester       Physical Exam    Airway   Mallampati: II  TM distance: >3 FB  Neck ROM: full       Cardiovascular - normal exam  Rhythm: regular  Rate: normal  (-) murmur     Dental - normal exam           Pulmonary - normal exam  Breath sounds clear to auscultation     Abdominal    Neurological - normal exam                 Anesthesia Plan    ASA 2       Plan - epidural   Neuraxial block will be labor analgesia                  Pertinent diagnostic labs and testing reviewed    Informed Consent:    Anesthetic plan and risks discussed with patient.

## 2021-10-28 NOTE — H&P
Obstetrics & Gynecology History & Physical Note    Date of Service  10/28/2021    Chief Complaint  Pt here for IOL 2/2 gHTN. Reports she has some light cramping, denies any regular contractions. Denies any LOF, VB, or decreased FM. Reports a mild headache but it got better when she fell asleep after arriving to hospital room. Denies any changes in vision, RUQ pain, swelling or any other issues.     History of Presenting Illness  Leila Navarro is a 26 y.o.  at 37w1d 2021, by Patient Reported. No LMP recorded. Patient is pregnant. She is being admitted for induction of labor.     Prenatal care is at [facility] and is complicated by:    Patient Active Problem List    Diagnosis Date Noted   • Elevated LFTs 10/01/2021   • Intractable migraine without status migrainosus 10/01/2021   • High-risk pregnancy supervision, third trimester 10/01/2021   • Gallstones 10/01/2021   • Gestational hypertension, third trimester 2021       Obstetric History  OB History    Para Term  AB Living   2 1 1     1   SAB TAB Ectopic Molar Multiple Live Births           0 1      # Outcome Date GA Lbr Emanuel/2nd Weight Sex Delivery Anes PTL Lv   2 Current            1 Term 10/25/18 37w1d 01:45 / 00:59 2.57 kg (5 lb 10.7 oz) F Vag-Spont EPI N LUISA       Gynecologic History  Pap neg; gc/ct neg this pregnancy     Review of Systems  ROS    Medical History   has a past medical history of Hypertension and Intractable migraine without status migrainosus (10/1/2021). She also has no past medical history of Addisons disease ([facility]), Adrenal disorder ([facility]), Allergy, Anemia, Anxiety, Arrhythmia, Arthritis, Asthma, Blood transfusion without reported diagnosis, Cancer ([facility]), Cataract, CHF (congestive heart failure) ([facility]), Clotting disorder ([facility]), COPD (chronic obstructive pulmonary disease) ([facility]), Cushings syndrome ([facility]), Depression, Diabetes ([facility]), Diabetic neuropathy ([facility]), GERD (gastroesophageal reflux disease), Glaucoma, Goiter,  "Head ache, Heart attack (HCC), Heart murmur, HIV (human immunodeficiency virus infection) (HCC), Hyperlipidemia, IBD (inflammatory bowel disease), Kidney disease, Meningitis, Muscle disorder, Osteoporosis, Parathyroid disorder (HCC), Pituitary disease (HCC), Pulmonary emphysema (HCC), Seizure (HCC), Sickle cell disease (HCC), Stroke (HCC), Substance abuse (HCC), Thyroid disease, Tuberculosis, or Urinary tract infection.    Surgical History   has no past surgical history on file.     Family History  family history is not on file.     Social History   reports that she has never smoked. She has never used smokeless tobacco. She reports that she does not drink alcohol and does not use drugs.    Allergies  No Known Allergies    Medications  Prior to Admission Medications   Prescriptions Last Dose Informant Patient Reported? Taking?   PRENATAL VIT-DOCUSATE-IRON-FA PO   Yes No   Sig: Take  by mouth.   acetaminophen (TYLENOL) 325 MG Tab   No No   Sig: Take 2 Tablets by mouth every four hours as needed.   labetalol (NORMODYNE) 200 MG Tab   No No   Sig: Take 1 Tablet by mouth 2 times a day.      Facility-Administered Medications: None       Physical Exam  Vitals:    10/28/21 0142 10/28/21 0143   BP: 136/86    Pulse: (!) 125 (!) 128   Resp: 16    Temp: 36.4 °C (97.6 °F)    TempSrc: Temporal    SpO2: 96% 96%   Weight: 89.8 kg (198 lb)    Height: 1.702 m (5' 7\")        General:   alert, cooperative, no distress   Skin:   normal   HEENT:  extraocular movements intact   Lungs:   clear to auscultation bilaterally   Heart:   regular rate and rhythm   Breasts:   deferred   Abdomen:  Abdomen soft, non-tender; gravid.   Pelvis: Exam deferred.   FHT:  135 BPM Fetal heart variability: moderate   [place]: External US   Presentations:   Vtx by BSUS   Cervix:     Dilation: 1;2    Effacement:  50    Station:  -3    Consistency:       Position:         Laboratory:  Prenatal Results     General (Most Recent Result)     Test Value Reference Range " Date Time    ABO  B   09/24/21 0756    Rh  POS   09/24/21 0756    Antibody screen        HbA1c        Chlamydia by PCR  Negative  Negative 08/13/20 1850    Gonorrhea by PCR  Negative  Negative 08/13/20 1850    RPR/Syphilus  Non Reactive  Non Reactive 10/24/18 2216    HSV 1/2 by PCR (non-serum)        HSV 1/2 (serum)        HSV 1        HSV 2        HPV (16)        HBsAg  Non-Reactive  Non-Reactive 10/09/21 2042    HIV-1 HIV-2 Antibodies  Non Reactive  Non Reactive 10/24/18 2216    Rubella  161.00 IU/mL  09/24/21 0756    Tb              Pap Smear (Most Recent Result)     Test Value Reference Range Date Time    Pap smear        Pap smear w/HPV        Pap smear w/CTNG        Pap smar w/HPV CTNG        Pap smear (reflex HPV ACUS)        Pap smear (reflex HPV ASCUS w/CTNG)        Pathology gyn specimen              Urinalysis (Most Recent Result)     Test Value Reference Range Date Time    Urinalysis  (See Report)    10/09/21 1815    POC urinalysis        Urine drug screen (w/ conf)        Urine culture ([id number])        Urine Protein/Creatinine Ratio  72 mg/g 10 - 107 10/09/21 1815          Urinalysis, Culture if indicated     Test Value Reference Range Date Time    Color  Yellow   10/09/21 1815    Appearance  Clear   10/09/21 1815    Specific Gravity  1.016  <1.035 10/09/21 1815    PH  6.5  5.0 - 8.0 10/09/21 1815    Glucose  Negative mg/dL Negative 10/09/21 1815    Ketones  Negative mg/dL Negative 10/09/21 1815    Protein  Negative mg/dL Negative 10/09/21 1815    Bilirubin  Negative  Negative 10/09/21 1815    Nitrites  Negative  Negative 10/09/21 1815    Leukocytes Esterase  Negative  Negative 10/09/21 1815    Blood  Negative  Negative 10/09/21 1815    Comment  see below   10/09/21 1815    Culture              Urine Drug Screen     Test Value Reference Range Date Time    Amphetamines        Barbiturates        Benzodiazepines        Cocaine        Methadone        Opiates        Oxycodone        Phencylidine         Propoxyphene        Marijuana Metabolite              1st Trimester     Test Value Reference Range Date Time    Hgb        Hct        Fasting Glucose Tolerance        GTT, 1 hour        GTT, 2 hours        GTT, 3 hours              2nd Trimester     Test Value Reference Range Date Time    Hgb        Hct        AST        ALT        Uric Acid        Fasting Glucose Tolerance        GTT, 1 hour        GTT, 2 hours        GTT, 3 hours              3rd Trimester     Test Value Reference Range Date Time    Hgb  10.6 g/dL 12.0 - 16.0 10/09/21 2042       9.2 g/dL 12.0 - 16.0 09/26/21 0307       10.5 g/dL 12.0 - 16.0 09/25/21 1159       9.7 g/dL 12.0 - 16.0 09/25/21 0409       10.2 g/dL 12.0 - 16.0 09/24/21 1550       10.7 g/dL 12.0 - 16.0 09/24/21 1040       10.6 g/dL 12.0 - 16.0 09/24/21 0151       10.7 g/dL 12.0 - 16.0 09/23/21 1500       10.6 g/dL 12.0 - 16.0 09/17/21 1810       11.1 g/dL 12.0 - 16.0 09/09/21 2238    Hct  32.7 % 37.0 - 47.0 10/09/21 2042       29.0 % 37.0 - 47.0 09/26/21 0307       31.8 % 37.0 - 47.0 09/25/21 1159       29.7 % 37.0 - 47.0 09/25/21 0409       31.3 % 37.0 - 47.0 09/24/21 1550       32.8 % 37.0 - 47.0 09/24/21 1040       33.1 % 37.0 - 47.0 09/24/21 0151       33.3 % 37.0 - 47.0 09/23/21 1500       33.1 % 37.0 - 47.0 09/17/21 1810       34.3 % 37.0 - 47.0 09/09/21 2238    Platelet count  346 K/uL 164 - 446 10/09/21 2042       374 K/uL 164 - 446 09/26/21 0307       409 K/uL 164 - 446 09/25/21 1159       388 K/uL 164 - 446 09/25/21 0409       421 K/uL 164 - 446 09/24/21 1550       421 K/uL 164 - 446 09/24/21 1040       378 K/uL 164 - 446 09/24/21 0151       422 K/uL 164 - 446 09/23/21 1500       397 K/uL 164 - 446 09/17/21 1810       397 K/uL 164 - 446 09/09/21 2238    GBS (BLANKENSHIP BROTH)  Negative  Negative 09/24/21 0820    Fasting Glucose Tolerance        GTT, 1 hour        GTT, 2 hous        GTT, 3hours              Congenital Disease Screening     Test Value Reference Range Date Time    First  Trimester Screen        Quad Screen        BH Electrophoresis  (See Report)    21 0307    Cystic Fibrosis Carrier Study        SMA        AFP Maternal Serum         AFP Tetra        NIPT              Legend    ^: Historical                          Urinalysis:    No results found     Imaging:  No orders to display       Assessment:  26 y.o.  37w1d who presents with  Pregnancy    Plan:  No new Assessment & Plan notes have been filed under this hospital service since the last note was generated.  Service: Obstetrics & Gynecology    1. Admit to L&D  2. Pre E labs  3. GBS: neg at 32 weeks, retrieving record from 10/20 swab and if none will repeat  4. Pain Control: tbd  5. Close BP monitoring   6. Cytotec now and recheck in four hours    VTE prophylaxis: n/a    JEAN Rowland.

## 2021-10-28 NOTE — PROGRESS NOTES
Pt presents to L&D with c/o UC's, pt denies any VB, LOF, and pt states feeing +FM. VS taken, EFM and TOCO applied. SVE as charted.    0150: TAYLER Wheatley updated on pt. Status in dept.     0405: JOSH Wheatley CNM at bedside, US completed to confirm vertex presentation, SVE as charted    0700: Bedside report given to Marlene RAMIREZ.

## 2021-10-29 LAB
ERYTHROCYTE [DISTWIDTH] IN BLOOD BY AUTOMATED COUNT: 38.5 FL (ref 35.9–50)
HCT VFR BLD AUTO: 29.8 % (ref 37–47)
HGB BLD-MCNC: 9.6 G/DL (ref 12–16)
MCH RBC QN AUTO: 24.3 PG (ref 27–33)
MCHC RBC AUTO-ENTMCNC: 32.2 G/DL (ref 33.6–35)
MCV RBC AUTO: 75.4 FL (ref 81.4–97.8)
PLATELET # BLD AUTO: 269 K/UL (ref 164–446)
PMV BLD AUTO: 10.1 FL (ref 9–12.9)
RBC # BLD AUTO: 3.95 M/UL (ref 4.2–5.4)
WBC # BLD AUTO: 10.3 K/UL (ref 4.8–10.8)

## 2021-10-29 PROCEDURE — 36415 COLL VENOUS BLD VENIPUNCTURE: CPT

## 2021-10-29 PROCEDURE — 85027 COMPLETE CBC AUTOMATED: CPT

## 2021-10-29 PROCEDURE — 700102 HCHG RX REV CODE 250 W/ 637 OVERRIDE(OP): Performed by: ADVANCED PRACTICE MIDWIFE

## 2021-10-29 PROCEDURE — 700102 HCHG RX REV CODE 250 W/ 637 OVERRIDE(OP): Performed by: NURSE PRACTITIONER

## 2021-10-29 PROCEDURE — 770002 HCHG ROOM/CARE - OB PRIVATE (112)

## 2021-10-29 PROCEDURE — A9270 NON-COVERED ITEM OR SERVICE: HCPCS | Performed by: NURSE PRACTITIONER

## 2021-10-29 PROCEDURE — A9270 NON-COVERED ITEM OR SERVICE: HCPCS | Performed by: ADVANCED PRACTICE MIDWIFE

## 2021-10-29 RX ORDER — VITAMIN A ACETATE, BETA CAROTENE, ASCORBIC ACID, CHOLECALCIFEROL, .ALPHA.-TOCOPHEROL ACETATE, DL-, THIAMINE MONONITRATE, RIBOFLAVIN, NIACINAMIDE, PYRIDOXINE HYDROCHLORIDE, FOLIC ACID, CYANOCOBALAMIN, CALCIUM CARBONATE, FERROUS FUMARATE, ZINC OXIDE, CUPRIC OXIDE 3080; 12; 120; 400; 1; 1.84; 3; 20; 22; 920; 25; 200; 27; 10; 2 [IU]/1; UG/1; MG/1; [IU]/1; MG/1; MG/1; MG/1; MG/1; MG/1; [IU]/1; MG/1; MG/1; MG/1; MG/1; MG/1
1 TABLET, FILM COATED ORAL
Status: DISCONTINUED | OUTPATIENT
Start: 2021-10-29 | End: 2021-10-30 | Stop reason: HOSPADM

## 2021-10-29 RX ORDER — DOCUSATE SODIUM 100 MG/1
100 CAPSULE, LIQUID FILLED ORAL 2 TIMES DAILY
Status: DISCONTINUED | OUTPATIENT
Start: 2021-10-29 | End: 2021-10-30 | Stop reason: HOSPADM

## 2021-10-29 RX ORDER — SODIUM CHLORIDE, SODIUM LACTATE, POTASSIUM CHLORIDE, CALCIUM CHLORIDE 600; 310; 30; 20 MG/100ML; MG/100ML; MG/100ML; MG/100ML
INJECTION, SOLUTION INTRAVENOUS PRN
Status: DISCONTINUED | OUTPATIENT
Start: 2021-10-29 | End: 2021-10-30 | Stop reason: HOSPADM

## 2021-10-29 RX ADMIN — PRENATAL WITH FERROUS FUM AND FOLIC ACID 1 TABLET: 3080; 920; 120; 400; 22; 1.84; 3; 20; 10; 1; 12; 200; 27; 25; 2 TABLET ORAL at 09:08

## 2021-10-29 RX ADMIN — IBUPROFEN 800 MG: 800 TABLET, FILM COATED ORAL at 17:15

## 2021-10-29 RX ADMIN — IBUPROFEN 800 MG: 800 TABLET, FILM COATED ORAL at 09:08

## 2021-10-29 RX ADMIN — IBUPROFEN 800 MG: 800 TABLET, FILM COATED ORAL at 00:36

## 2021-10-29 ASSESSMENT — PAIN SCALES - GENERAL: PAIN_LEVEL: 5

## 2021-10-29 ASSESSMENT — PAIN DESCRIPTION - PAIN TYPE
TYPE: ACUTE PAIN

## 2021-10-29 NOTE — NON-PROVIDER
Delivery Note for Vaginal Delivery     Stage 1: 26 y.o.  at 37w1d weeks admitted for IOL. Her pregnancy has been complicated by GHTN. Her labor progressed spontaneously.  The patient was noted to be GBS negative.  Fetal monitoring during labor was overall category I.  Patient had epidural for pain control.     Stage II: At , she was noted to be complete.  She then pushed for 5 minutes to deliver a  viable, vigorous male infant on 10/28/21 at 2214.  The delivery was uncomplicated. Shoulders were delivered easily.  The infant was placed immediately upon mother’s abdomen. Cord gases were not collected and Apgars at 1 and 5 minutes were 9/9 and the infant has not yet been weighed.    Stage III: Attention was then turned to active management of the third stage. The placenta was delivered spontaneously with gentle manual traction on the umbilical cord with countertraction maintained suprapubically.  On inspection, the placenta was intact and had normal insertion.  Upon delivery of the placenta, good hemostasis was noted with fundal massage and a 40 unit bolus of pitocin in IV infusion was administered per protocol.     Laceration repair: The perineum was inspected following delivery. The patient did not have any lacerations.     Estimated blood loss for the above procedures was 250cc.     Mother and infant in stable condition, and family pleased with birth.

## 2021-10-29 NOTE — PROGRESS NOTES
0700: Report received from [facility] RNShaun. 12 hour chart check completed and orders/MAR reviewed.     0910: Patient assessment completed. Discussed pain management plan and patient requests Motrin be offered as available. Patient denies dizziness and headaches; states she is voiding w/o difficulty. Reviewed plan of care, all questions answered, and rounding in place.

## 2021-10-29 NOTE — ANESTHESIA POSTPROCEDURE EVALUATION
Patient: Leila Navarro    Procedure Summary     Date: 10/28/21 Room / Location:     Anesthesia Start: 1215 Anesthesia Stop: 2214    Procedure: Labor Epidural Diagnosis:     Scheduled Providers:  Responsible Provider: Earlene Rodríguez M.D.    Anesthesia Type: epidural ASA Status: 2          Final Anesthesia Type: epidural  Last vitals  BP   Blood Pressure: 122/97    Temp   36.7 °C (98 °F)    Pulse   95   Resp   18    SpO2   99 %      Anesthesia Post Evaluation    Patient location during evaluation: floor  Patient participation: complete - patient participated  Level of consciousness: awake and alert  Pain score: 5    Airway patency: patent  Anesthetic complications: no  Cardiovascular status: hemodynamically stable  Respiratory status: acceptable  Hydration status: euvolemic    PONV: none          No complications documented.

## 2021-10-29 NOTE — CARE PLAN
The patient is Stable - Low risk of patient condition declining or worsening    Shift Goals  Clinical Goals: Pain management, normal lochia, establish breast feeding    Progress made toward(s) clinical / shift goals:      Problem: Pain - Standard  Goal: Alleviation of pain or a reduction in pain to the patient’s comfort goal  Outcome: Progressing  Note: Pt continuously monitored for pain, educated on pain management options. Call light within reach, pt understands to call for RN regarding any needs or concerns.       Problem: Infection - Postpartum  Goal: Postpartum patient will be free of signs and symptoms of infection  Outcome: Progressing  Note: Pt will remain afebrile, and will show no s/s of infection.

## 2021-10-29 NOTE — PROGRESS NOTES
Pt requests to initiate pumping - pt states  has not sustained a latch since 0930 this am and she can feel that her milk is in and she wants to feed back collected milk. Rusty, lactation consultant updated on request and order placed.

## 2021-10-29 NOTE — PROGRESS NOTES
Assumed care from labor and delivery. Oriented patient to room, call light, emergency light, TV, bed remote. Assessment completed, fundus firm, lochia light.  Plan of care reviewed, verbalized understanding. Patient requests ibuprofen at this time, will call if pain med intervention needed.

## 2021-10-29 NOTE — LACTATION NOTE
This note was copied from a baby's chart.  Mother reports that she is breastfeeding her  without difficulty or discomfort. Resources for out-patient support discussed.Mother can easily express milk, nipples are intact.

## 2021-10-29 NOTE — L&D DELIVERY NOTE
Admit Date:   10/28/2021      Pregnancy Complications: GHTN  Medical Induction of Labor: yes  GBS: negative  Anesthesia: epidural  Gestational Age at delivery: 37w1d    Patient on pitocin for IOL. After AROM and placement of IUPC, patient progressed quickly without any additional pitocin titration. Patient was noted to have early and deep variable decelerations in heart rate. She was checked and found to be anterior lip/0 station. Unable to recover fetal heart tones until hands and knees position was employed. Recovery was noted and patient moved to lithotomy position with room set up for delivery. She pushed well to deliver viable male infant in RAYSHAWN position at 2214. No nuchal cord was noted. Infant placed to maternal abdomen where he was dried and stimulated. A spontaneous cry was noted. Apgar 9,9.    After delayed cord clamping, cord was doubly clamped and cut by father. Pitocin infused via IV bolus. Signs of placenta separation noted and gentle cord traction was completed. Intact placenta was delivered spontaneously at 2220 where 3VC was noted. EBL 250ml. Fundus firm with minimal massage. Inspection of vulva and vagina was completed and no lacerations were noted. Routine postpartum care was initiated as mother and infant stable. Infant weight is pending.    JOSH DOWNING delivered under my direct supervision and direction.     Cisco ALICEA CNM/ Dr. Carty, Attending

## 2021-10-29 NOTE — PROGRESS NOTES
190: Bedside report received from Marlene RAMIREZ, POC discussed, VS taken, pt denies any needs/concerns at this time.     : L Andrea SNM and CLAY Quiñones CNM at bedside to evaluate pt    : L Walker SNM at bedside to complete SVE     : Charge RN, CLAY Quiñones CNM at bedside to assist with repositioning of pt     4:  viable male infant, APGARS 9/9    0015: Bedside report given to Shaun RAMIREZ

## 2021-10-29 NOTE — NON-PROVIDER
PATIENT ID:  NAME:  Leila Navarro  MRN:               [id number]  YOB: 1995    Subjective:   Patient resting comfortably with an epidural, family at bedside. Spoke with patient about placing an IUPC to better monitor the strength of the contractions and D5LR for IV hydration to help give mother and baby more energy. All questions answered and patient agrees with plan.    Objective:    Vitals:    10/28/21 1911 10/28/21 1931 10/28/21 1951 10/28/21 2011   BP: 113/80 130/83 118/80 (!) 163/99   Pulse: 85 100 (!) 107 (!) 118   Resp: 16      Temp: 36.4 °C (97.6 °F)      TempSrc: Temporal      SpO2:       Weight:       Height:           Cervix:  5cm/90/-1, AROM @ 2050 for clear fluid  [place]: Uterine Contractions Q 2-3 minutes.   FHRM: Baseline 135, moderate variability, Accels present, no decels present  Pitocin: 17  Pain comfortable with epidural    Assessment:   26 y.o. female  at 37w1d.  GBS negative  Vertex   Category 1 FHT    Plan:   1. D5LR for IV hydration  2. IUPC placed to measure the strength of contractions  3. Plan to recheck cervix in 2 hours or PRN  4. Continuous fetal heart rate and maternal monitoring

## 2021-10-29 NOTE — ANESTHESIA TIME REPORT
Anesthesia Start and Stop Event Times     Date Time Event    10/28/2021 1215 Anesthesia Start     2214 Anesthesia Stop        Responsible Staff  10/28/21    Name Role Begin End    Earlene Rodríguez M.D. Anesth 1215 2215        Preop Diagnosis (Free Text):  Pre-op Diagnosis             Preop Diagnosis (Codes):    Premium Reason  A. 3PM - 7AM    Comments:

## 2021-10-29 NOTE — CARE PLAN
The patient is Stable - Low risk of patient condition declining or worsening    Shift Goals  Clinical Goals: Pain management, normal lochia, establish breast feeding    Progress made toward(s) clinical / shift goals:  Patient reports adequate pain management. Bleeding is normal. Will work on latch through the night.    Patient is not progressing towards the following goals:

## 2021-10-30 ENCOUNTER — PHARMACY VISIT (OUTPATIENT)
Dept: PHARMACY | Facility: MEDICAL CENTER | Age: 26
End: 2021-10-30
Payer: COMMERCIAL

## 2021-10-30 VITALS
BODY MASS INDEX: 31.08 KG/M2 | TEMPERATURE: 98.5 F | WEIGHT: 198 LBS | OXYGEN SATURATION: 99 % | HEIGHT: 67 IN | HEART RATE: 69 BPM | SYSTOLIC BLOOD PRESSURE: 127 MMHG | DIASTOLIC BLOOD PRESSURE: 87 MMHG | RESPIRATION RATE: 18 BRPM

## 2021-10-30 PROCEDURE — A9270 NON-COVERED ITEM OR SERVICE: HCPCS | Performed by: NURSE PRACTITIONER

## 2021-10-30 PROCEDURE — RXMED WILLOW AMBULATORY MEDICATION CHARGE: Performed by: OBSTETRICS & GYNECOLOGY

## 2021-10-30 PROCEDURE — 700102 HCHG RX REV CODE 250 W/ 637 OVERRIDE(OP): Performed by: NURSE PRACTITIONER

## 2021-10-30 RX ORDER — PSEUDOEPHEDRINE HCL 30 MG
100 TABLET ORAL 2 TIMES DAILY
Qty: 60 CAPSULE | Refills: 1 | Status: SHIPPED | OUTPATIENT
Start: 2021-10-30

## 2021-10-30 RX ORDER — IBUPROFEN 800 MG/1
800 TABLET ORAL EVERY 8 HOURS PRN
Qty: 30 TABLET | Refills: 1 | Status: SHIPPED | OUTPATIENT
Start: 2021-10-30

## 2021-10-30 RX ADMIN — ACETAMINOPHEN 1000 MG: 500 TABLET ORAL at 05:37

## 2021-10-30 RX ADMIN — IBUPROFEN 800 MG: 800 TABLET, FILM COATED ORAL at 05:38

## 2021-10-30 ASSESSMENT — PAIN DESCRIPTION - PAIN TYPE
TYPE: ACUTE PAIN

## 2021-10-30 ASSESSMENT — EDINBURGH POSTNATAL DEPRESSION SCALE (EPDS)
THINGS HAVE BEEN GETTING ON TOP OF ME: NO, I HAVE BEEN COPING AS WELL AS EVER
I HAVE BEEN ANXIOUS OR WORRIED FOR NO GOOD REASON: NO, NOT AT ALL
I HAVE FELT SAD OR MISERABLE: NO, NOT AT ALL
I HAVE BLAMED MYSELF UNNECESSARILY WHEN THINGS WENT WRONG: NO, NEVER
I HAVE FELT SCARED OR PANICKY FOR NO GOOD REASON: NO, NOT AT ALL
I HAVE BEEN ABLE TO LAUGH AND SEE THE FUNNY SIDE OF THINGS: AS MUCH AS I ALWAYS COULD
I HAVE BEEN SO UNHAPPY THAT I HAVE HAD DIFFICULTY SLEEPING: NOT AT ALL
I HAVE BEEN SO UNHAPPY THAT I HAVE BEEN CRYING: NO, NEVER
I HAVE LOOKED FORWARD WITH ENJOYMENT TO THINGS: AS MUCH AS I EVER DID
THE THOUGHT OF HARMING MYSELF HAS OCCURRED TO ME: NEVER

## 2021-10-30 NOTE — PROGRESS NOTES
2055 Assessment completed. Lochia light, fundus firm. Plan of care reviewed. Declines pain intervention at this time, will call if pain intervention needed.

## 2021-10-30 NOTE — CARE PLAN
The patient is Stable - Low risk of patient condition declining or worsening    Shift Goals  Clinical Goals: Patient will maintain stable VS; Lochia WDL; Pain WDL    Progress made toward(s) clinical / shift goals:    Problem: Knowledge Deficit - L&D  Goal: Patient and family/caregivers will demonstrate understanding of plan of care, disease process/condition, diagnostic tests and medications  Outcome: Progressing     Problem: Risk for Excess Fluid Volume  Goal: Patient will demonstrate pulse, blood pressure and neurologic signs within expected ranges and without any respiratory complications  Outcome: Progressing     Problem: Pain - Standard  Goal: Alleviation of pain or a reduction in pain to the patient’s comfort goal  Outcome: Progressing     Problem: Knowledge Deficit - Standard  Goal: Patient and family/care givers will demonstrate understanding of plan of care, disease process/condition, diagnostic tests and medications  Outcome: Progressing     Problem: Knowledge Deficit - Postpartum  Goal: Patient will verbalize and demonstrate understanding of self and infant care  Outcome: Progressing     Problem: Psychosocial - Postpartum  Goal: Patient will verbalize and demonstrate effective bonding and parenting behavior  Outcome: Progressing     Problem: Altered Physiologic Condition  Goal: Patient physiologically stable as evidenced by normal lochia, palpable uterine involution and vitals within normal limits  Outcome: Progressing     Problem: Infection - Postpartum  Goal: Postpartum patient will be free of signs and symptoms of infection  Outcome: Progressing   :

## 2021-10-30 NOTE — DISCHARGE PLANNING
Meds-to-Beds: Discharge prescription orders listed below delivered to patient's bedside. RN notified. Patient counseled.      Current Outpatient Medications   Medication Sig Dispense Refill   • docusate sodium 100 MG Cap Take 1 capsule by mouth 2 times a day. 60 Capsule 1   • ibuprofen (MOTRIN) 800 MG Tab Take 1 Tablet by mouth every 8 hours as needed (For cramping after delivery) 30 Tablet 1      Natalio Zamora, Pharmacy Intern

## 2021-10-30 NOTE — PROGRESS NOTES
0700:Report received from NOC RNBrenda. 12 hour chart check completed and orders/MAR reviewed.     1130: Patient assessment completed. Discharge education reviewed with patient and partner. Verified Meds-to-Beds delivery of prescriptions and medication list reviewed. Pt verbalized understanding of discharge instructions including follow-up appointments. Patient given copies of discharge education and discharge summary handouts. Pt verbalizes understanding and all questions answered.

## 2021-10-30 NOTE — CARE PLAN
The patient is Stable - Low risk of patient condition declining or worsening    Shift Goals  Clinical Goals: VSS; pain management     Progress made toward(s) clinical / shift goals:  Discussed pain management and verbalization of pain utilizing the 0-10 pain scale. White board updated with times of pain medication availability and pt requests pain medication be provided as pt will call for medication as needed. Discussed non-pharmacological pain control therapies and pt provided perineal ice packs, heat packs, Tucks, and lidocaine spray per request. Pt ambulates with a steady gait and demonstrates the ability to participate in ADLs and provide care for .       Patient is not progressing towards the following goals: NA

## 2021-10-30 NOTE — DISCHARGE INSTRUCTIONS

## 2021-10-30 NOTE — CARE PLAN
The patient is Stable - Low risk of patient condition declining or worsening    Shift Goals  Clinical Goals: VSS; pt will meet all clinical goals for discharge     Progress made toward(s) clinical / shift goals:  All clinical goals met for discharge.     Problem: Knowledge Deficit - L&D  Goal: Patient and family/caregivers will demonstrate understanding of plan of care, disease process/condition, diagnostic tests and medications  Outcome: Met     Problem: Risk for Excess Fluid Volume  Goal: Patient will demonstrate pulse, blood pressure and neurologic signs within expected ranges and without any respiratory complications  Outcome: Met     Problem: Pain - Standard  Goal: Alleviation of pain or a reduction in pain to the patient’s comfort goal  Outcome: Met     Problem: Knowledge Deficit - Standard  Goal: Patient and family/care givers will demonstrate understanding of plan of care, disease process/condition, diagnostic tests and medications  Outcome: Met     Problem: Knowledge Deficit - Postpartum  Goal: Patient will verbalize and demonstrate understanding of self and infant care  Outcome: Met     Problem: Psychosocial - Postpartum  Goal: Patient will verbalize and demonstrate effective bonding and parenting behavior  Outcome: Met     Problem: Altered Physiologic Condition  Goal: Patient physiologically stable as evidenced by normal lochia, palpable uterine involution and vitals within normal limits  Outcome: Met     Problem: Infection - Postpartum  Goal: Postpartum patient will be free of signs and symptoms of infection  Outcome: Met       Patient is not progressing towards the following goals: NA

## 2021-11-03 DIAGNOSIS — O09.93 HIGH-RISK PREGNANCY SUPERVISION, THIRD TRIMESTER: ICD-10-CM

## 2022-01-10 ENCOUNTER — HOSPITAL ENCOUNTER (EMERGENCY)
Facility: MEDICAL CENTER | Age: 27
End: 2022-01-10
Payer: MEDICAID

## 2022-01-10 VITALS
DIASTOLIC BLOOD PRESSURE: 95 MMHG | RESPIRATION RATE: 16 BRPM | WEIGHT: 191.8 LBS | OXYGEN SATURATION: 98 % | HEART RATE: 102 BPM | HEIGHT: 67 IN | SYSTOLIC BLOOD PRESSURE: 132 MMHG | TEMPERATURE: 97.9 F | BODY MASS INDEX: 30.1 KG/M2

## 2022-01-10 LAB
ALBUMIN SERPL BCP-MCNC: 4.3 G/DL (ref 3.2–4.9)
ALBUMIN/GLOB SERPL: 1.2 G/DL
ALP SERPL-CCNC: 95 U/L (ref 30–99)
ALT SERPL-CCNC: 15 U/L (ref 2–50)
ANION GAP SERPL CALC-SCNC: 12 MMOL/L (ref 7–16)
APPEARANCE UR: CLEAR
AST SERPL-CCNC: 16 U/L (ref 12–45)
BASOPHILS # BLD AUTO: 0.8 % (ref 0–1.8)
BASOPHILS # BLD: 0.05 K/UL (ref 0–0.12)
BILIRUB SERPL-MCNC: 0.2 MG/DL (ref 0.1–1.5)
BILIRUB UR QL STRIP.AUTO: NEGATIVE
BUN SERPL-MCNC: 13 MG/DL (ref 8–22)
CALCIUM SERPL-MCNC: 9.7 MG/DL (ref 8.5–10.5)
CHLORIDE SERPL-SCNC: 104 MMOL/L (ref 96–112)
CO2 SERPL-SCNC: 22 MMOL/L (ref 20–33)
COLOR UR: YELLOW
CREAT SERPL-MCNC: 0.94 MG/DL (ref 0.5–1.4)
EOSINOPHIL # BLD AUTO: 0.25 K/UL (ref 0–0.51)
EOSINOPHIL NFR BLD: 3.8 % (ref 0–6.9)
ERYTHROCYTE [DISTWIDTH] IN BLOOD BY AUTOMATED COUNT: 45.3 FL (ref 35.9–50)
GLOBULIN SER CALC-MCNC: 3.7 G/DL (ref 1.9–3.5)
GLUCOSE SERPL-MCNC: 90 MG/DL (ref 65–99)
GLUCOSE UR STRIP.AUTO-MCNC: NEGATIVE MG/DL
HCG SERPL QL: NEGATIVE
HCT VFR BLD AUTO: 37.9 % (ref 37–47)
HGB BLD-MCNC: 12.1 G/DL (ref 12–16)
IMM GRANULOCYTES # BLD AUTO: 0.02 K/UL (ref 0–0.11)
IMM GRANULOCYTES NFR BLD AUTO: 0.3 % (ref 0–0.9)
KETONES UR STRIP.AUTO-MCNC: ABNORMAL MG/DL
LEUKOCYTE ESTERASE UR QL STRIP.AUTO: NEGATIVE
LIPASE SERPL-CCNC: 29 U/L (ref 11–82)
LYMPHOCYTES # BLD AUTO: 2.73 K/UL (ref 1–4.8)
LYMPHOCYTES NFR BLD: 41.7 % (ref 22–41)
MCH RBC QN AUTO: 23.7 PG (ref 27–33)
MCHC RBC AUTO-ENTMCNC: 31.9 G/DL (ref 33.6–35)
MCV RBC AUTO: 74.3 FL (ref 81.4–97.8)
MICRO URNS: ABNORMAL
MONOCYTES # BLD AUTO: 0.39 K/UL (ref 0–0.85)
MONOCYTES NFR BLD AUTO: 6 % (ref 0–13.4)
NEUTROPHILS # BLD AUTO: 3.1 K/UL (ref 2–7.15)
NEUTROPHILS NFR BLD: 47.4 % (ref 44–72)
NITRITE UR QL STRIP.AUTO: NEGATIVE
NRBC # BLD AUTO: 0 K/UL
NRBC BLD-RTO: 0 /100 WBC
PH UR STRIP.AUTO: 5.5 [PH] (ref 5–8)
PLATELET # BLD AUTO: 506 K/UL (ref 164–446)
PMV BLD AUTO: 9.7 FL (ref 9–12.9)
POTASSIUM SERPL-SCNC: 4.3 MMOL/L (ref 3.6–5.5)
PROT SERPL-MCNC: 8 G/DL (ref 6–8.2)
PROT UR QL STRIP: NEGATIVE MG/DL
RBC # BLD AUTO: 5.1 M/UL (ref 4.2–5.4)
RBC UR QL AUTO: NEGATIVE
SODIUM SERPL-SCNC: 138 MMOL/L (ref 135–145)
SP GR UR STRIP.AUTO: 1.03
UROBILINOGEN UR STRIP.AUTO-MCNC: 0.2 MG/DL
WBC # BLD AUTO: 6.5 K/UL (ref 4.8–10.8)

## 2022-01-10 PROCEDURE — 302449 STATCHG TRIAGE ONLY (STATISTIC)

## 2022-01-10 PROCEDURE — 83690 ASSAY OF LIPASE: CPT

## 2022-01-10 PROCEDURE — 80053 COMPREHEN METABOLIC PANEL: CPT

## 2022-01-10 PROCEDURE — 84703 CHORIONIC GONADOTROPIN ASSAY: CPT

## 2022-01-10 PROCEDURE — 81003 URINALYSIS AUTO W/O SCOPE: CPT

## 2022-01-10 PROCEDURE — 85025 COMPLETE CBC W/AUTO DIFF WBC: CPT

## 2022-01-10 ASSESSMENT — FIBROSIS 4 INDEX: FIB4 SCORE: 0.56

## 2022-01-10 NOTE — ED TRIAGE NOTES
Leila Navarro  26 y.o.  female  Chief Complaint   Patient presents with   • RUQ Pain     Pt c/o 10/10 intermittent RUQ pain since Dec 26, sharp, radiates to right upper back, worse when standing up, no worse after eating. Pain has been constant since last night. +nausea, no vomiting. Pt has known gallbladder stones detected while pregnant - gave birth Oct 28th.       Looks uncomfortable. Labs ordered.

## 2022-01-11 NOTE — ED NOTES
Pt decided to leave. Pt advised to return to ER if symptoms worsen. Pt explained of the risks of leaving. Pt signed AMA form and will be dismissed.

## 2022-04-12 NOTE — PROGRESS NOTES
Obstetrics & Gynecology Post-Delivery Progress Note    Date of Service      26 y.o.  s/p vaginal, spontaneous  Delivery date: 10/28/21    Events  Gestational Hypertension- labile blood pressures. Not on meds.     Subjective  Pain: No  Bleeding: lochia minimal  PO's: taking regular diet  Voiding: without difficulty  Ambulating: yes  Passing flatus: Yes  Feeding: breastfeeding well    Objective  Temp:  [36.4 °C (97.5 °F)-36.8 °C (98.2 °F)] 36.8 °C (98.2 °F)  Pulse:  [] 75  Resp:  [16-18] 17  BP: (109-163)/(62-99) 144/96  SpO2:  [99 %] 99 %    Physical Exam  General: well and resting  Chest/Breasts: nipples intact   Abdomen: normal bowel sounds, soft, non-distended  Fundus: firm and at umbilicus  Incision: not applicable, (vaginal delivery)  Perineum: deferred  Extremities: symmetric, calves nontender    Recent Labs     10/28/21  0250 10/29/21  0553   WBC 6.5 10.3   RBC 4.27 3.95*   HEMOGLOBIN 10.3* 9.6*   HEMATOCRIT 31.5* 29.8*   MCV 73.8* 75.4*   MCH 24.1* 24.3*   RDW 37.6 38.5   PLATELETCT 355 269   MPV 10.3 10.1   NEUTSPOLYS 63.20  --    LYMPHOCYTES 28.50  --    MONOCYTES 6.50  --    EOSINOPHILS 0.60  --    BASOPHILS 0.30  --        Assessment/Plan  Leila Navarro is a 26 y.o.yo  s/p postpartum day #1  s/p vaginal, spontaneous    1. Post care: meeting all goals  2. Hemodynamics: stable- will restart ferrous sulfate  3. Pain: controlled  4. Rh+, Rubella Immune  5. Method of Feeding: plans to breastfeed  6. Method of Contraception: did not assess  7. Disposition: likely home postpartum day 2    VTE prophylaxis: none indicated     Doxepin Pregnancy And Lactation Text: This medication is Pregnancy Category C and it isn't known if it is safe during pregnancy. It is also excreted in breast milk and breast feeding isn't recommended.

## 2022-05-07 NOTE — DISCHARGE SUMMARY
Discharge Summary:     Leila Navarro   Admit date 10/28/21    Admitting diagnosis: gestational HTN   Discharge Date:  10/30/21    Discharge Diagnosis:PPD 2 sp     Past Medical History:   Diagnosis Date   • Hypertension    • Intractable migraine without status migrainosus 10/1/2021     OB History    Para Term  AB Living   2 2 2     2   SAB TAB Ectopic Molar Multiple Live Births           0 2      # Outcome Date GA Lbr Emanuel/2nd Weight Sex Delivery Anes PTL Lv   2 Term 10/28/21 37w1d / 12:18 2.595 kg (5 lb 11.5 oz) M Vag-Spont EPI N LUISA   1 Term 10/25/18 37w1d 01:45 / 00:59 2.57 kg (5 lb 10.7 oz) F Vag-Spont EPI N LUISA       Patient has no known allergies.  Patient Active Problem List    Diagnosis Date Noted   • Elevated LFTs 10/01/2021   • Intractable migraine without status migrainosus 10/01/2021   • High-risk pregnancy supervision, third trimester 10/01/2021   • Gallstones 10/01/2021   • Gestational hypertension, third trimester 2021       Hospital Course:   26 y.o. , now para2, was admitted with the above mentioned diagnosis, underwent vaginal, spontaneous. Patient postpartum course was unremarkable, with progressive advancement in diet , ambulation and toleration of oral analgesia. Patient without complaints today and desires discharge. Blood pressures remained mild range and patient asymptomatic (no HA, RUQ pain, visual changes).    Vitals:    10/29/21 1348 10/29/21 1823 10/29/21 2200 10/30/21 0539   BP: 131/90 122/81 125/92 127/87   Pulse: 79 80 85 69   Resp: 16 16 18 18   Temp: 36.5 °C (97.7 °F) 37.1 °C (98.7 °F) 36.8 °C (98.3 °F) 36.9 °C (98.5 °F)   TempSrc: Temporal Temporal Temporal Temporal   SpO2: 97% 100% 99% 99%   Weight:       Height:         Exam:  Gen: A&Ox3 NAD  Abdomen: FFU below U  CV- no LE edema  Resp- normal effort  Ext- no edema, cyanosis, clubbing, or TTP  normal postpartum course  No areas of skin breakdown/redness; surgical incision  intact/healing    Labs:    Lab Results   Component Value Date/Time    WBC 10.3 10/29/2021 05:53 AM    RBC 3.95 (L) 10/29/2021 05:53 AM    HEMOGLOBIN 9.6 (L) 10/29/2021 05:53 AM    HEMATOCRIT 29.8 (L) 10/29/2021 05:53 AM    MCV 75.4 (L) 10/29/2021 05:53 AM    MCH 24.3 (L) 10/29/2021 05:53 AM    MCHC 32.2 (L) 10/29/2021 05:53 AM    MPV 10.1 10/29/2021 05:53 AM        Activity:   Discharge to home  Pelvic Rest x 6 weeks  Follow up: [facility] or [facility] in 5 weeks for vaginal ; 1 week for blood pressure check    Discharge Meds:   Motrin 800 mg , as directed  Colace 100 mg BID  FeS04 325 mg as directed     intact